# Patient Record
Sex: FEMALE | Race: WHITE | NOT HISPANIC OR LATINO | Employment: FULL TIME | URBAN - METROPOLITAN AREA
[De-identification: names, ages, dates, MRNs, and addresses within clinical notes are randomized per-mention and may not be internally consistent; named-entity substitution may affect disease eponyms.]

---

## 2017-01-04 ENCOUNTER — GENERIC CONVERSION - ENCOUNTER (OUTPATIENT)
Dept: OTHER | Facility: OTHER | Age: 52
End: 2017-01-04

## 2017-01-06 ENCOUNTER — APPOINTMENT (OUTPATIENT)
Dept: LAB | Facility: HOSPITAL | Age: 52
End: 2017-01-06
Payer: COMMERCIAL

## 2017-01-06 ENCOUNTER — TRANSCRIBE ORDERS (OUTPATIENT)
Dept: LAB | Facility: HOSPITAL | Age: 52
End: 2017-01-06

## 2017-01-06 DIAGNOSIS — J31.0 CHRONIC RHINITIS: ICD-10-CM

## 2017-01-06 DIAGNOSIS — R10.9 ABDOMINAL PAIN, UNSPECIFIED SITE: Primary | ICD-10-CM

## 2017-01-06 DIAGNOSIS — R10.9 ABDOMINAL PAIN, UNSPECIFIED SITE: ICD-10-CM

## 2017-01-06 PROCEDURE — 86003 ALLG SPEC IGE CRUDE XTRC EA: CPT

## 2017-01-06 PROCEDURE — 82785 ASSAY OF IGE: CPT

## 2017-01-09 LAB
ALLERGEN COMMENT: ABNORMAL
ALMOND IGE QN: 0.17 KUA/I
CASHEW NUT IGE QN: 0.13 KUA/I
CODFISH IGE QN: <0.1 KUA/I
EGG WHITE IGE QN: <0.1 KUA/I
GLUTEN IGE QN: <0.1 KUA/I
HAZELNUT IGE QN: 0.14 KUA/L
MILK IGE QN: <0.1 KUA/I
PEANUT (RARA H) 1 IGE QN: <0.1 KUA/I
PEANUT (RARA H) 2 IGE QN: <0.1 KUA/I
PEANUT (RARA H) 3 IGE QN: <0.1 KUA/I
PEANUT (RARA H) 8 IGE QN: <0.1 KUA/I
PEANUT (RARA H) 9 IGE QN: <0.1 KUA/I
PEANUT IGE QN: 0.2 KUA/I
SALMON IGE QN: <0.1 KUA/I
SCALLOP IGE QN: <0.1 KUA/I
SESAME SEED IGE QN: 0.21 KUA/I
SHRIMP IGE QN: <0.1 KUA/I
SOYBEAN IGE QN: 0.16 KUA/I
TOTAL IGE SMQN RAST: 3.88 KU/L (ref 0–113)
TUNA IGE QN: <0.1 KUA/I
WALNUT IGE QN: 0.13 KUA/I
WHEAT IGE QN: 0.19 KUA/I

## 2017-01-10 ENCOUNTER — ALLSCRIPTS OFFICE VISIT (OUTPATIENT)
Dept: OTHER | Facility: OTHER | Age: 52
End: 2017-01-10

## 2017-02-14 ENCOUNTER — ALLSCRIPTS OFFICE VISIT (OUTPATIENT)
Dept: OTHER | Facility: OTHER | Age: 52
End: 2017-02-14

## 2017-02-14 DIAGNOSIS — Z12.31 ENCOUNTER FOR SCREENING MAMMOGRAM FOR MALIGNANT NEOPLASM OF BREAST: ICD-10-CM

## 2017-02-14 PROCEDURE — G0145 SCR C/V CYTO,THINLAYER,RESCR: HCPCS | Performed by: OBSTETRICS & GYNECOLOGY

## 2017-02-14 PROCEDURE — 87624 HPV HI-RISK TYP POOLED RSLT: CPT | Performed by: OBSTETRICS & GYNECOLOGY

## 2017-02-16 ENCOUNTER — LAB REQUISITION (OUTPATIENT)
Dept: LAB | Facility: HOSPITAL | Age: 52
End: 2017-02-16
Payer: COMMERCIAL

## 2017-02-16 DIAGNOSIS — Z01.419 ENCOUNTER FOR GYNECOLOGICAL EXAMINATION WITHOUT ABNORMAL FINDING: ICD-10-CM

## 2017-02-21 LAB — HPV RRNA GENITAL QL NAA+PROBE: NORMAL

## 2017-02-23 LAB
LAB AP GYN PRIMARY INTERPRETATION: NORMAL
Lab: NORMAL

## 2017-03-01 ENCOUNTER — ALLSCRIPTS OFFICE VISIT (OUTPATIENT)
Dept: OTHER | Facility: OTHER | Age: 52
End: 2017-03-01

## 2017-03-13 ENCOUNTER — HOSPITAL ENCOUNTER (OUTPATIENT)
Dept: RADIOLOGY | Facility: HOSPITAL | Age: 52
Discharge: HOME | End: 2017-03-13
Attending: OBSTETRICS & GYNECOLOGY
Payer: COMMERCIAL

## 2017-03-13 DIAGNOSIS — Z12.31 ENCOUNTER FOR SCREENING MAMMOGRAM FOR MALIGNANT NEOPLASM OF BREAST: ICD-10-CM

## 2017-03-13 PROCEDURE — G0202 SCR MAMMO BI INCL CAD: HCPCS

## 2017-04-25 ENCOUNTER — GENERIC CONVERSION - ENCOUNTER (OUTPATIENT)
Dept: OTHER | Facility: OTHER | Age: 52
End: 2017-04-25

## 2017-06-19 ENCOUNTER — ALLSCRIPTS OFFICE VISIT (OUTPATIENT)
Dept: OTHER | Facility: OTHER | Age: 52
End: 2017-06-19

## 2017-08-04 ENCOUNTER — APPOINTMENT (OUTPATIENT)
Dept: LAB | Facility: HOSPITAL | Age: 52
End: 2017-08-04
Payer: COMMERCIAL

## 2017-08-04 ENCOUNTER — TRANSCRIBE ORDERS (OUTPATIENT)
Dept: LAB | Facility: HOSPITAL | Age: 52
End: 2017-08-04

## 2017-08-04 DIAGNOSIS — Z00.8 ENCOUNTER FOR OTHER GENERAL EXAMINATION: Primary | ICD-10-CM

## 2017-08-04 DIAGNOSIS — Z00.8 ENCOUNTER FOR OTHER GENERAL EXAMINATION: ICD-10-CM

## 2017-08-04 LAB
CHOLEST SERPL-MCNC: 247 MG/DL (ref 50–200)
EST. AVERAGE GLUCOSE BLD GHB EST-MCNC: 131 MG/DL
HBA1C MFR BLD: 6.2 % (ref 4.2–6.3)
HDLC SERPL-MCNC: 48 MG/DL (ref 40–60)
LDLC SERPL CALC-MCNC: 156 MG/DL (ref 0–100)
TRIGL SERPL-MCNC: 214 MG/DL

## 2017-08-04 PROCEDURE — 36415 COLL VENOUS BLD VENIPUNCTURE: CPT

## 2017-08-04 PROCEDURE — 83036 HEMOGLOBIN GLYCOSYLATED A1C: CPT

## 2017-08-04 PROCEDURE — 80061 LIPID PANEL: CPT

## 2017-12-29 ENCOUNTER — ALLSCRIPTS OFFICE VISIT (OUTPATIENT)
Dept: OTHER | Facility: OTHER | Age: 52
End: 2017-12-29

## 2018-01-02 ENCOUNTER — GENERIC CONVERSION - ENCOUNTER (OUTPATIENT)
Dept: OTHER | Facility: OTHER | Age: 53
End: 2018-01-02

## 2018-01-03 NOTE — PROGRESS NOTES
Assessment   1  Stress (V62 89) (F43 9)   2  Gastritis (535 50) (K29 70)   3  Muscle tension headache (307 81) (G44 209)   4  Neck pain (723 1) (M54 2)    Plan   Gastritis    · Omeprazole 40 MG Oral Capsule Delayed Release; TAKE 1 CAPSULE EVERY    DAY   · Follow Up if Not Better Evaluation and Treatment  Follow-up  Status: Complete  Done:    [id number]  Muscle tension headache    · Butalbital-APAP-Caffeine -40 MG Oral Capsule (Fioricet); 1-2 TABS PO Q    4-6 HRS/PRN HEADACHE  DO NOT EXCEED 6 TABS A DAY  Neck pain    · OMT 1-2 body regions - POC; Status:Complete;   Done: 08HQZ5544 08:48AM    Discussion/Summary      Soft tissue to cervical and upper thorax  stress and tension HA's  Possible side effects of new medications were reviewed with the patient/guardian today  The treatment plan was reviewed with the patient/guardian  The patient/guardian understands and agrees with the treatment plan       Self Referrals: No      Chief Complaint   pt c/o neck pain and headaches      History of Present Illness   HPI: Neck pain and HA's  Stress,  in Hospital for 2 days with A-Fib  Left CT junction and cervical discomfort  Stomach has been upset, some nausea, decreased appetite  Review of Systems        Constitutional: No fever, no chills, feels well, no tiredness, no recent weight gain or loss  ENT: no ear ache, no loss of hearing, no nosebleeds or nasal discharge, no sore throat or hoarseness  Cardiovascular: no complaints of slow or fast heart rate, no chest pain, no palpitations, no leg claudication or lower extremity edema  Respiratory: no complaints of shortness of breath, no wheezing, no dyspnea on exertion, no orthopnea or PND  Gastrointestinal: abdominal pain-- and-- nausea, but-- as noted in HPI  Genitourinary: no complaints of dysuria, no incontinence, no pelvic pain, no dysmenorrhea, no vaginal discharge or abnormal vaginal bleeding  Musculoskeletal: as noted in HPI  Integumentary: no complaints of skin rash or lesion, no itching or dry skin, no skin wounds  Neurological: headache, but-- as noted in HPI,-- no numbness,-- no tingling,-- no confusion,-- no dizziness-- and-- no fainting  Active Problems   1  Abdominal pressure (789 00) (R10 9)   2  Acute sinusitis (461 9) (J01 90)   3  Anxiety (300 00) (F41 9)   4  Cough (786 2) (R05)   5  Dehydration (276 51) (E86 0)   6  Depression (311) (F32 9)   7  Encounter for gynecological examination (V72 31) (Z01 419)   8  Encounter for screening colonoscopy (V76 51) (Z12 11)   9  Encounter for screening mammogram for malignant neoplasm of breast (V76 12)     (Z12 31)   10  Esophageal reflux (530 81) (K21 9)   11  Essential hypertriglyceridemia (272 1) (E78 1)   12  Fatigue (780 79) (R53 83)   13  Fever blister (054 9) (B00 1)   14  Food allergy (V15 05) (Z91 018)   15  Gastroenteritis, acute (558 9) (K52 9)   16  H/O cold sores (V12 09) (Z86 19)   17  Headache (784 0) (R51)   18  History of allergy (V15 09) (Z88 9)   19  Hypercholesterolemia (272 0) (E78 00)   20  Lymph Nodes Enlarged (785 6)   21  Nausea (787 02) (R11 0)   22  Pain in joint of left shoulder (719 41) (M25 512)   23  Pap test, as part of routine gynecological examination (V76 2) (Z01 419)   24  Partial tear of rotator cuff (840 4) (M75 110)   25  Prolapsing Mitral Valve Leaflet Syndrome (424 0)   26  Rhinitis (472 0) (J31 0)   27  Screening for depression (V79 0) (Z13 89)   28  Seasonal allergies (477 9) (J30 2)   29  Shingles (053 9) (B02 9)   30  Unequal leg length (acquired) (736 81) (M21 70)   31  Urinary frequency (788 41) (R35 0)   32  Vitamin D deficiency (268 9) (E55 9)   33  Wheezing (786 07) (R06 2)    Past Medical History   1  Acute bronchitis (466 0) (J20 9)   2  History of Ankle pain, unspecified laterality   3  History of Cervicalgia (723 1) (M54 2)   4  History of abdominal pain (V13 89) (Z87 898)   5   History of insomnia (V13 89) (Z72 188) 6  History of tension headache (V13 89) (Z87 898)   7  History of Nasal congestion (478 19) (R09 81)   8  History of Pain in wrist, unspecified laterality (719 43) (M25 539)   9  History of Pain of lower leg, unspecified laterality (729 5) (M79 669)   10  Viral infection (079 99) (B34 9)    Family History   Mother    1  Family history of Stroke Syndrome (V17 1)  Father    2  Family history of Stroke Syndrome (V17 1)  Brother    3  Family history of Stroke Syndrome (V17 1)  Family History    4  Family history of Diabetes Mellitus (V18 0)   5  Family history of Hyperlipidemia   6  Family history of Hypertension (V17 49)    Social History    · Being A Social Drinker   · Never A Smoker    Surgical History   1  History of Cholecystectomy Laparoscopic    Current Meds    1  Black Cohosh 20 MG Oral Tablet; TAKE AS DIRECTED; Therapy: 04LOY4422 to Recorded   2  LORazepam 0 5 MG Oral Tablet; 1 TAB TID PRN; Therapy: 85BFW4432 to (Evaluate:99Jpg9773)  Requested for: 86HQO8068; Last     Rx:08Jun2017 Ordered   3  Multivitamins Oral Capsule; 1 TAB DAILY; Therapy: 08KGR2267 to (Last Rx:10Jan2012)  Requested for: 19AKI5923 Ordered   4  Omega-3 Fish Oil 1200 MG Oral Capsule; TAKE AS DIRECTED; Therapy: 91JBH3888 to Recorded   5  Probiotic Oral Capsule; Therapy: 94IQF8078 to Recorded   6  Simvastatin 20 MG Oral Tablet; TAKE 1 TABLET DAILY; Therapy: 84ENQ6158 to (Evaluate:23Scv6351)  Requested for: 10KQJ4477; Last     Rx:90Rpm1753 Ordered   7  ZyrTEC Allergy 10 MG Oral Tablet; TAKE 1 TABLET DAILY AS DIRECTED; Therapy: 31HHU1603 to (Evaluate:07Apr2018)  Requested for: 03 98 18 21 22; Last     Rx:09Oct2017 Ordered     The medication list was reviewed and updated today  Allergies   1  Bactrim TABS  2  Food   3  Nuts   4  Pollen   5  Seasonal   6  Seeds   7  Trees   8  [address]   9   Wheat    Vitals    Recorded: 51PBO5166 11:45AM   Temperature 98 3 F, Oral   Heart Rate 76   Systolic 633, LUE   Diastolic 84, LUE Height 5 ft 4 75 in   Weight 169 lb    BMI Calculated 28 34   BSA Calculated 1 84   O2 Saturation 98     Physical Exam        Constitutional      General appearance: No acute distress, well appearing and well nourished  Eyes      Conjunctiva and lids: No swelling, erythema or discharge  Pupils and irises: Equal, round and reactive to light  Ears, Nose, Mouth, and Throat      External inspection of ears and nose: Normal        Otoscopic examination: Tympanic membranes translucent with normal light reflex  Canals patent without erythema  Nasal mucosa, septum, and turbinates: Normal without edema or erythema  Oropharynx: Normal with no erythema, edema, exudate or lesions  Pulmonary      Respiratory effort: No increased work of breathing or signs of respiratory distress  Auscultation of lungs: Clear to auscultation  Cardiovascular      Auscultation of heart: Normal rate and rhythm, normal S1 and S2, without murmurs  Examination of extremities for edema and/or varicosities: Normal        Abdomen      Abdomen: Non-tender, no masses  Liver and spleen: No hepatomegaly or splenomegaly  Lymphatic      Palpation of lymph nodes in neck: No lymphadenopathy  Musculoskeletal      Gait and station: Normal        Digits and nails: Normal without clubbing or cyanosis  Inspection/palpation of joints, bones, and muscles: Abnormal  -- (Supine: Muscle spasms cervical, tender soft tissue)      Skin      Skin and subcutaneous tissue: Normal without rashes or lesions  Neurologic      Cranial nerves: Cranial nerves 2-12 intact  Reflexes: 2+ and symmetric  Sensation: No sensory loss         Psychiatric      Orientation to person, place, and time: Normal        Mood and affect: Normal           Signatures    Electronically signed by : Abdirashid Rodríguez DO; Jan 2 2018  8:49AM EST                       (Author)

## 2018-01-12 NOTE — MISCELLANEOUS
Message    Date: 06/09/2016 12:27 PM,   Pt called emergency line to say she has a UTI and needs Cipro Rx sent in because she is leaving on vacation Saturday morning  I called patient and explained to patient that she needs to schedule an appointment tomorrow  Pt refused because she said she needs something today bc of her hx of UTI's  She states her UTI's usually get worst very fast- she knows if she does not start treatment then she will get hematuria by tonight  Per Dr Payam Kimble verbal orders, rx for Cipro [facility] be [facility] to [facility]  I called patient and let her know Dr Elzbieta Beyer did say it was ok to send Cipro rx in and suggested patient should also drink plenty of water  She stated she understood  VR/vfp        Active Problems    1  Abdominal pressure (789 00) (R10 9)   2  Acute sinusitis (461 9) (J01 90)   3  Anxiety (300 00) (F41 9)   4  Dehydration (276 51) (E86 0)   5  Depression (311) (F32 9)   6  Encounter for screening colonoscopy (V76 51) (Z12 11)   7  Encounter for screening mammogram for malignant neoplasm of breast (V76 12)   (Z12 31)   8  Esophageal reflux (530 81) (K21 9)   9  Essential hypertriglyceridemia (272 1) (E78 1)   10  Fatigue (780 79) (R53 83)   11  Fever blister (054 9) (B00 1)   12  Headache (784 0) (R51)   13  History of allergy (V15 09) (Z88 9)   14  Hypercholesterolemia (272 0) (E78 0)   15  Lymph Nodes Enlarged (785 6)   16  Pain in joint of left shoulder (719 41) (M25 512)   17  Pap test, as part of routine gynecological examination (V76 2) (Z01 419)   18  Partial tear of rotator cuff (840 4) (M75 110)   19  Prolapsing Mitral Valve Leaflet Syndrome (424 0)   20  Rhinitis (472 0) (J31 0)   21  Seasonal allergies (477 9) (J30 2)   22  Shingles (053 9) (B02 9)   23  Unequal leg length (acquired) (736 81) (M21 70)   24  Urinary frequency (788 41) (R35 0)   25  Vitamin D deficiency (268 9) (E55 9)    Current Meds   1   Ciprofloxacin HCl - 250 MG Oral Tablet; TAKE 1 TABLET EVERY 12 HOURS DAILY; Therapy: 70ZBY4427 to (Abdoul Job)  Requested for: 81Aud5421; Last   Rx:25Phm5893 Ordered   2  Multivitamins Oral Capsule; 1 TAB DAILY; Therapy: 60GVG5162 to (Last Rx:10Jan2012)  Requested for: 17BVI4536 Ordered   3  Pyridium 200 MG Oral Tablet; TAKE 1 TABLET 3 TIMES DAILY AFTER MEALS; Therapy: 67CFJ2942 to (Tawanda Saxena)  Requested for: 04Qjy5349; Last   Rx:13Nex2319 Ordered   4  Simvastatin 20 MG Oral Tablet; TAKE 1 TABLET DAILY; Therapy: 96GJT8300 to (Pedro Mejia)  Requested for: 20-33-70-48; Last   Rx:28Oct2015 Ordered   5  ZyrTEC Allergy 10 MG Oral Tablet; TAKE 1 TABLET DAILY AS DIRECTED; Therapy: 65OIC4553 to (Evaluate:64Wbs9458)  Requested for: 34WFE7227; Last   Rx:16Nov2015 Ordered    Allergies    1  Bactrim TABS    2  No Known Food Allergies   3  Pollen   4   Trees    Plan  Urinary frequency    · Ciprofloxacin HCl - 250 MG Oral Tablet; TAKE 1 TABLET EVERY 12 HOURS  DAILY    Signatures   Electronically signed by : Patricia Romero DO; Ghulam 10 2016  1:36PM EST                       (Author)

## 2018-01-13 VITALS
RESPIRATION RATE: 20 BRPM | BODY MASS INDEX: 27.52 KG/M2 | DIASTOLIC BLOOD PRESSURE: 66 MMHG | WEIGHT: 165.2 LBS | TEMPERATURE: 97.6 F | HEIGHT: 65 IN | OXYGEN SATURATION: 96 % | HEART RATE: 70 BPM | SYSTOLIC BLOOD PRESSURE: 110 MMHG

## 2018-01-13 VITALS
SYSTOLIC BLOOD PRESSURE: 112 MMHG | BODY MASS INDEX: 26.36 KG/M2 | WEIGHT: 164 LBS | HEIGHT: 66 IN | DIASTOLIC BLOOD PRESSURE: 70 MMHG

## 2018-01-13 VITALS
HEIGHT: 65 IN | DIASTOLIC BLOOD PRESSURE: 68 MMHG | RESPIRATION RATE: 16 BRPM | BODY MASS INDEX: 28.82 KG/M2 | OXYGEN SATURATION: 98 % | WEIGHT: 173 LBS | TEMPERATURE: 98.3 F | HEART RATE: 73 BPM | SYSTOLIC BLOOD PRESSURE: 116 MMHG

## 2018-01-13 VITALS
BODY MASS INDEX: 27.48 KG/M2 | DIASTOLIC BLOOD PRESSURE: 84 MMHG | WEIGHT: 171 LBS | SYSTOLIC BLOOD PRESSURE: 140 MMHG | HEART RATE: 66 BPM | TEMPERATURE: 98 F | HEIGHT: 66 IN

## 2018-01-15 NOTE — MISCELLANEOUS
Message   Date: 01/04/2017 11:36 AM,   Pt states she has been having stomach issues for a while now, it keeps her at night and even wakes her up from sleep  Pt was referred to an allergy specialist, Dr Esme Guthrie, by a dietitian at [facility]  When patient called to set up appt she was told they prefer patient get MRT lab test for allergy ordered by PCP and if it is abnormal then they will schedule to see her  Patient requesting allergy lab test   LM for patient stating Dr Tomasz Dale said he can order an allergy food profile lab test but he is not familiar what a MRT lab test is  I stated patient should call back to let us know if she would like to  the lab script or if she would like it faxed, etc  VR/vfp        Active Problems    1  Abdominal pressure (789 00) (R10 9)   2  Acute sinusitis (461 9) (J01 90)   3  Anxiety (300 00) (F41 9)   4  Dehydration (276 51) (E86 0)   5  Depression (311) (F32 9)   6  Encounter for screening colonoscopy (V76 51) (Z12 11)   7  Encounter for screening mammogram for malignant neoplasm of breast (V76 12)   (Z12 31)   8  Esophageal reflux (530 81) (K21 9)   9  Essential hypertriglyceridemia (272 1) (E78 1)   10  Fatigue (780 79) (R53 83)   11  Fever blister (054 9) (B00 1)   12  Gastroenteritis, acute (558 9) (K52 9)   13  H/O cold sores (V12 09) (Z86 19)   14  Headache (784 0) (R51)   15  History of allergy (V15 09) (Z88 9)   16  Hypercholesterolemia (272 0) (E78 00)   17  Lymph Nodes Enlarged (785 6)   18  Nausea (787 02) (R11 0)   19  Pain in joint of left shoulder (719 41) (M25 512)   20  Pap test, as part of routine gynecological examination (V76 2) (Z01 419)   21  Partial tear of rotator cuff (840 4) (M75 110)   22  Prolapsing Mitral Valve Leaflet Syndrome (424 0)   23  Rhinitis (472 0) (J31 0)   24  Seasonal allergies (477 9) (J30 2)   25  Shingles (053 9) (B02 9)   26  Unequal leg length (acquired) (736 81) (M21 70)   27  Urinary frequency (788 41) (R35 0)   28   Vitamin D deficiency (268 9) (E55 9)    Current Meds   1  Multivitamins Oral Capsule; 1 TAB DAILY; Therapy: 99CWJ7210 to (Last Rx:10Jan2012)  Requested for: 01YSK8426 Ordered   2  Ondansetron HCl - 4 MG Oral Tablet; TAKE 1 TABLET Twice daily PRN; Therapy: 99Odb4102 to (Gibran Faye)  Requested for: 56Qcm7060; Last   Rx:68Ubk8035 Ordered   3  Simvastatin 20 MG Oral Tablet; TAKE 1 TABLET DAILY; Therapy: 02SGL4787 to (Renee Silvestre)  Requested for: 21Oct2016; Last   Rx:49Sdn8128; Status: ACTIVE - Renewal Denied Ordered   4  ValACYclovir HCl - 500 MG Oral Tablet; TAKE 1 TABLET EVERY DAY; Therapy: 64SPC6982 to (Last Rx:11Vvc2855)  Requested for: 99Slt5542 Ordered   5  ZyrTEC Allergy 10 MG Oral Tablet; TAKE 1 TABLET DAILY AS DIRECTED; Therapy: 45TIF6043 to (Solomon Bo)  Requested for: 30Aug2016; Last   Rx:74Brl4068 Ordered    Allergies    1  Bactrim TABS    2  No Known Food Allergies   3  Pollen   4  Trees    Plan  Abdominal pressure, Rhinitis    · *(Q) FOOD ALLERGY PROFILE; Status:Active;  Requested SCS:71XCD2229;     Signatures   Electronically signed by : Jaye Lozano DO; Jan 9 2017  9:20AM EST                       (Author)

## 2018-01-15 NOTE — MISCELLANEOUS
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  Patient states she is unable to make it into the office for a visit today because of work but she can schedule to see you another day, she would like something called in today to prevent it from getting too bad  17 Feb 2016 5:14 PM   LM for pt per Dr Wilmer Lowery, stating generic for valtrex was sent into [facility] pharmacy for her and if she has any other questions or concerns she can call us back  VR/vfp        Active Problems    1  Acute sinusitis (461 9) (J01 90)   2  Anxiety (300 00) (F41 9)   3  Dehydration (276 51) (E86 0)   4  Depression (311) (F32 9)   5  Encounter for screening colonoscopy (V76 51) (Z12 11)   6  Encounter for screening mammogram for malignant neoplasm of breast (V76 12)   (Z12 31)   7  Esophageal reflux (530 81) (K21 9)   8  Essential hypertriglyceridemia (272 1) (E78 1)   9  Fatigue (780 79) (R53 83)   10  Fever blister (054 9) (B00 1)   11  Headache (784 0) (R51)   12  History of allergy (V15 09) (Z88 9)   13  Hypercholesterolemia (272 0) (E78 0)   14  Lymph Nodes Enlarged (785 6)   15  Pain in joint of left shoulder (719 41) (M25 512)   16  Pap test, as part of routine gynecological examination (V76 2) (Z12 4)   17  Partial tear of rotator cuff (840 4) (M75 110)   18  Prolapsing Mitral Valve Leaflet Syndrome (424 0)   19  Rhinitis (472 0) (J31 0)   20  Seasonal allergies (477 9) (J30 2)   21  Shingles (053 9) (B02 9)   22  Unequal leg length (acquired) (736 81) (M21 70)   23  Vitamin D deficiency (268 9) (E55 9)    Current Meds   1  Multivitamins Oral Capsule; 1 TAB DAILY;    Therapy: 59OCE7203 to (Last Rx:10Jan2012)  Requested for: 66RUS6763 Ordered   2  Simvastatin 20 MG Oral Tablet; TAKE 1 TABLET DAILY; Therapy: 78RZU5416 to (0489 33 97 26)  Requested for: 20-33-70-48; Last   Rx:28Oct2015 Ordered   3  TraMADol HCl - 50 MG Oral Tablet; TAKE 1 TABLET 3 TIMES DAILY AS NEEDED; Therapy: 69ROG7664 to (Evaluate:67Hol2343); Last Rx:30Oct2015 Ordered   4  ValACYclovir HCl - 1 GM Oral Tablet; TAKE 1 TABLET 3 TIMES DAILY; Therapy: 26ILO0932 to (Jaya Semen)  Requested for: 63RCN2411; Last   Rx:28Oct2015 Ordered   5  ZyrTEC Allergy 10 MG Oral Tablet; TAKE 1 TABLET DAILY AS DIRECTED; Therapy: 51BFA0020 to (Evaluate:89Zme9266)  Requested for: 15LZD7421; Last   Rx:16Nov2015 Ordered    Allergies    1  Bactrim TABS    2  No Known Food Allergies   3  Pollen   4   Trees    Plan  Shingles    · ValACYclovir HCl - 1 GM Oral Tablet (Valtrex); TAKE 1 TABLET 3 TIMES DAILY    Signatures   Electronically signed by : Blaze Boucher DO; Feb 19 2016  7:40AM EST                       (Author)

## 2018-01-23 VITALS
TEMPERATURE: 98.3 F | HEART RATE: 76 BPM | SYSTOLIC BLOOD PRESSURE: 120 MMHG | HEIGHT: 65 IN | OXYGEN SATURATION: 98 % | BODY MASS INDEX: 28.16 KG/M2 | WEIGHT: 169 LBS | DIASTOLIC BLOOD PRESSURE: 84 MMHG

## 2018-01-23 NOTE — MISCELLANEOUS
Message  Return to work or school:   Napoleon Verduzco is under my professional care  She was seen in my office on 12/29/2017  Please excuse patient for 01/03/2018 to 01/04/2018  She is able to return to work on  01/05/2018              Signatures   Electronically signed by : Lilia Chong DO; Jan 2 2018  3:17PM EST                       (Author)

## 2018-03-01 ENCOUNTER — OFFICE VISIT (OUTPATIENT)
Dept: OBGYN CLINIC | Facility: CLINIC | Age: 53
End: 2018-03-01
Payer: COMMERCIAL

## 2018-03-01 VITALS
WEIGHT: 171 LBS | HEIGHT: 65 IN | DIASTOLIC BLOOD PRESSURE: 62 MMHG | BODY MASS INDEX: 28.49 KG/M2 | SYSTOLIC BLOOD PRESSURE: 100 MMHG

## 2018-03-01 DIAGNOSIS — Z12.39 BREAST CANCER SCREENING: ICD-10-CM

## 2018-03-01 DIAGNOSIS — N95.2 VAGINAL ATROPHY: ICD-10-CM

## 2018-03-01 DIAGNOSIS — Z01.419 ENCOUNTER FOR ANNUAL ROUTINE GYNECOLOGICAL EXAMINATION: Primary | ICD-10-CM

## 2018-03-01 PROCEDURE — 99396 PREV VISIT EST AGE 40-64: CPT | Performed by: OBSTETRICS & GYNECOLOGY

## 2018-03-01 PROCEDURE — 87624 HPV HI-RISK TYP POOLED RSLT: CPT | Performed by: OBSTETRICS & GYNECOLOGY

## 2018-03-01 PROCEDURE — G0145 SCR C/V CYTO,THINLAYER,RESCR: HCPCS | Performed by: OBSTETRICS & GYNECOLOGY

## 2018-03-01 RX ORDER — FOLIC ACID/MULTIVIT,IRON,MINER 0.4MG-18MG
TABLET ORAL
COMMUNITY
Start: 2017-03-01

## 2018-03-01 RX ORDER — ESTRADIOL 10 UG/1
1 INSERT VAGINAL 2 TIMES WEEKLY
Qty: 8 TABLET | Refills: 3 | Status: SHIPPED | OUTPATIENT
Start: 2018-03-01 | End: 2018-08-13

## 2018-03-01 RX ORDER — LORAZEPAM 0.5 MG/1
1 TABLET ORAL 3 TIMES DAILY PRN
COMMUNITY
Start: 2012-01-10 | End: 2018-08-13 | Stop reason: DRUGHIGH

## 2018-03-01 RX ORDER — OMEPRAZOLE 40 MG/1
1 CAPSULE, DELAYED RELEASE ORAL DAILY
COMMUNITY
Start: 2017-12-29 | End: 2018-04-11 | Stop reason: SDUPTHER

## 2018-03-01 RX ORDER — AZITHROMYCIN 250 MG/1
TABLET, FILM COATED ORAL DAILY
COMMUNITY
Start: 2017-06-19 | End: 2018-08-13

## 2018-03-01 RX ORDER — SIMVASTATIN 20 MG
1 TABLET ORAL DAILY
COMMUNITY
Start: 2014-09-30 | End: 2018-04-24 | Stop reason: SDUPTHER

## 2018-03-01 NOTE — PROGRESS NOTES
Assessment/Plan:    Pap smear done with HPV  Encouraged self-breast examination as well as calcium supplementation  Continue with annual mammogram   Discussed treatment options for symptomatic vaginal atrophy  She has failed various lubricants, os vena  Discussed vaginal estrogen  Risks and benefits reviewed  She will try Vagifem them 2 times per week  She did have a sensitivity to osphena and will monitor the symptoms closely when she is using vaginal estrogen  She will call with an update in 6-8 weeks  Return to office in 1 year  No problem-specific Assessment & Plan notes found for this encounter  Diagnoses and all orders for this visit:    Encounter for annual routine gynecological examination    Vaginal atrophy  -     estradiol (VAGIFEM, YUVAFEM) 10 MCG TABS vaginal tablet; Insert 1 tablet (10 mcg total) into the vagina 2 (two) times a week    Breast cancer screening  -     Mammo screening bilateral w cad    Other orders  -     azithromycin (ZITHROMAX) 250 mg tablet; Take by mouth daily  -     Black Cohosh 20 MG TABS; Take by mouth  -     LORazepam (ATIVAN) 0 5 mg tablet; Take 1 tablet by mouth 3 (three) times a day as needed  -     Multiple Vitamin (MULTIVITAMINS PO); Take 1 tablet by mouth daily  -     Omega-3 Fatty Acids (OMEGA-3 FISH OIL) 1200 MG CAPS; Take by mouth  -     omeprazole (PriLOSEC) 40 MG capsule; Take 1 capsule by mouth daily  -     Probiotic Product (PROBIOTIC-10) CAPS; Take by mouth  -     simvastatin (ZOCOR) 20 mg tablet; Take 1 tablet by mouth daily  -     Cetirizine HCl (ZYRTEC ALLERGY) 10 MG CAPS; Take 1 tablet by mouth daily          Subjective:      Patient ID: Raji Connor is a 52 y o  female  HPI      This is a 49-year-old female [de-identified] who presents for annual gyn exam   She went through menopause at age 50  She denies any vaginal bleeding or spotting  No changes in bowel or bladder function    She is sexually active and has been in a monogamous relationship with her  for over 23 years  She has had difficulty with vaginal dryness, dyspareunia  She has tried various lubricants over-the-counter which has been unsuccessful  Last year she did try ospena  After 2-3 days she had a rash noted along her trunk it was felt that she had an allergy  We discussed using vaginal estrogen risks and benefits reviewed  All questions answered  She would like to try this over the next month  Patient is due for her mammogram this month  Her last colonoscopy was in 2012 which was normal  She is up-to-date with her screening labs including cholesterol  The following portions of the patient's history were reviewed and updated as appropriate: allergies, current medications, past family history, past medical history, past social history, past surgical history and problem list     Review of Systems   Constitutional: Negative for fatigue, fever and unexpected weight change  Respiratory: Negative for cough, chest tightness, shortness of breath and wheezing  Cardiovascular: Negative  Negative for chest pain and palpitations  Gastrointestinal: Negative  Negative for abdominal distention, abdominal pain, blood in stool, constipation, diarrhea, nausea and vomiting  Genitourinary: Positive for dyspareunia  Negative for difficulty urinating, dysuria, flank pain, frequency, genital sores, hematuria, pelvic pain, urgency, vaginal bleeding, vaginal discharge and vaginal pain  Skin: Negative for rash  Objective:      /62   Ht 5' 5" (1 651 m)   Wt 77 6 kg (171 lb)   Breastfeeding? No   BMI 28 46 kg/m²          Physical Exam   Constitutional: She appears well-developed and well-nourished  Cardiovascular: Normal rate and regular rhythm  Pulmonary/Chest: Effort normal and breath sounds normal  Right breast exhibits no inverted nipple, no mass, no nipple discharge, no skin change and no tenderness   Left breast exhibits no inverted nipple, no mass, no nipple discharge, no skin change and no tenderness  Abdominal: Soft  Bowel sounds are normal  She exhibits no distension  There is no tenderness  There is no rebound and no guarding  Genitourinary: Rectum normal, vagina normal and uterus normal  There is no lesion on the right labia  There is no lesion on the left labia  Cervix exhibits no motion tenderness, no discharge and no friability  Right adnexum displays no mass, no tenderness and no fullness  Left adnexum displays no mass, no tenderness and no fullness  No vaginal discharge found     Genitourinary Comments: Vaginal atrophy noted

## 2018-03-06 LAB — HPV RRNA GENITAL QL NAA+PROBE: NORMAL

## 2018-03-10 LAB
LAB AP GYN PRIMARY INTERPRETATION: NORMAL
Lab: NORMAL

## 2018-04-09 RX ORDER — OMEPRAZOLE 40 MG/1
CAPSULE, DELAYED RELEASE ORAL
Qty: 90 CAPSULE | Refills: 0 | OUTPATIENT
Start: 2018-04-09

## 2018-04-11 DIAGNOSIS — R12 HEART BURN: Primary | ICD-10-CM

## 2018-04-11 RX ORDER — OMEPRAZOLE 40 MG/1
40 CAPSULE, DELAYED RELEASE ORAL DAILY
Qty: 90 CAPSULE | Refills: 0 | Status: SHIPPED | OUTPATIENT
Start: 2018-04-11 | End: 2018-07-10 | Stop reason: SDUPTHER

## 2018-04-20 ENCOUNTER — HOSPITAL ENCOUNTER (OUTPATIENT)
Dept: RADIOLOGY | Facility: HOSPITAL | Age: 53
Discharge: HOME | End: 2018-04-20
Attending: OBSTETRICS & GYNECOLOGY
Payer: COMMERCIAL

## 2018-04-20 PROCEDURE — 77067 SCR MAMMO BI INCL CAD: CPT

## 2018-04-23 RX ORDER — SIMVASTATIN 20 MG
TABLET ORAL
Qty: 90 TABLET | Refills: 0 | Status: CANCELLED | OUTPATIENT
Start: 2018-04-23

## 2018-04-24 DIAGNOSIS — E78.00 HYPERCHOLESTEREMIA: Primary | ICD-10-CM

## 2018-04-24 RX ORDER — SIMVASTATIN 20 MG
20 TABLET ORAL DAILY
Qty: 90 TABLET | Refills: 0 | Status: SHIPPED | OUTPATIENT
Start: 2018-04-24 | End: 2018-08-08 | Stop reason: SDUPTHER

## 2018-04-25 ENCOUNTER — TELEPHONE (OUTPATIENT)
Dept: OBGYN CLINIC | Facility: CLINIC | Age: 53
End: 2018-04-25

## 2018-04-25 NOTE — TELEPHONE ENCOUNTER
Patient calling with update on medication prescribed at last appt,- he is doing well and at this time she is going to continue

## 2018-05-18 ENCOUNTER — TELEPHONE (OUTPATIENT)
Dept: OBGYN CLINIC | Facility: CLINIC | Age: 53
End: 2018-05-18

## 2018-05-18 NOTE — TELEPHONE ENCOUNTER
Pt is stopping the medication yuvafen due to the side effect  She is starting to get severe depression and thinks is coming from that  She feels better now that off of it  She just wanted to let you know  She said if any questions, could give her a call   And id problems came back she would call back in

## 2018-06-20 ENCOUNTER — APPOINTMENT (OUTPATIENT)
Dept: LAB | Facility: HOSPITAL | Age: 53
End: 2018-06-20

## 2018-06-20 ENCOUNTER — TRANSCRIBE ORDERS (OUTPATIENT)
Dept: LAB | Facility: HOSPITAL | Age: 53
End: 2018-06-20

## 2018-06-20 DIAGNOSIS — Z00.8 HEALTH EXAMINATION IN POPULATION SURVEY: Primary | ICD-10-CM

## 2018-06-20 DIAGNOSIS — Z00.8 HEALTH EXAMINATION IN POPULATION SURVEY: ICD-10-CM

## 2018-06-20 LAB
CHOLEST SERPL-MCNC: 274 MG/DL (ref 50–200)
EST. AVERAGE GLUCOSE BLD GHB EST-MCNC: 126 MG/DL
HBA1C MFR BLD: 6 % (ref 4.2–6.3)
HDLC SERPL-MCNC: 52 MG/DL (ref 40–60)
NONHDLC SERPL-MCNC: 222 MG/DL
TRIGL SERPL-MCNC: 442 MG/DL

## 2018-06-20 PROCEDURE — 36415 COLL VENOUS BLD VENIPUNCTURE: CPT

## 2018-06-20 PROCEDURE — 80061 LIPID PANEL: CPT

## 2018-06-20 PROCEDURE — 83036 HEMOGLOBIN GLYCOSYLATED A1C: CPT

## 2018-07-10 DIAGNOSIS — R12 HEART BURN: ICD-10-CM

## 2018-07-10 RX ORDER — OMEPRAZOLE 40 MG/1
CAPSULE, DELAYED RELEASE ORAL
Qty: 90 CAPSULE | Refills: 0 | Status: SHIPPED | OUTPATIENT
Start: 2018-07-10 | End: 2018-08-13 | Stop reason: SDUPTHER

## 2018-07-10 NOTE — TELEPHONE ENCOUNTER
Pt called and asked for refills for Omeprazole, explained to patient she is due for 6 month follow up  She said she doesn't feel like she needs to come in for a visit since this is the only medication she takes, she states she just had blood work done and her BMI and she has a lot going on, patient stated she would just stop taking medication  Explained to patient that I will check with Dr Mark De La Cruz about sending refill but she should continue taking her medication and follow up with Dr Mark De La Cruz when she can especially since she just had blood work done  Patient understood

## 2018-08-06 DIAGNOSIS — E78.00 HYPERCHOLESTEREMIA: ICD-10-CM

## 2018-08-07 RX ORDER — SIMVASTATIN 20 MG
TABLET ORAL
Qty: 90 TABLET | Refills: 0 | OUTPATIENT
Start: 2018-08-07

## 2018-08-08 DIAGNOSIS — E78.00 HYPERCHOLESTEREMIA: ICD-10-CM

## 2018-08-08 RX ORDER — SIMVASTATIN 20 MG
20 TABLET ORAL DAILY
Qty: 90 TABLET | Refills: 0 | Status: SHIPPED | OUTPATIENT
Start: 2018-08-08 | End: 2018-11-12 | Stop reason: SDUPTHER

## 2018-08-13 ENCOUNTER — OFFICE VISIT (OUTPATIENT)
Dept: FAMILY MEDICINE CLINIC | Facility: CLINIC | Age: 53
End: 2018-08-13
Payer: COMMERCIAL

## 2018-08-13 VITALS
TEMPERATURE: 98.1 F | SYSTOLIC BLOOD PRESSURE: 124 MMHG | WEIGHT: 165.4 LBS | OXYGEN SATURATION: 98 % | BODY MASS INDEX: 27.56 KG/M2 | HEIGHT: 65 IN | HEART RATE: 79 BPM | DIASTOLIC BLOOD PRESSURE: 66 MMHG

## 2018-08-13 DIAGNOSIS — E78.00 HYPERCHOLESTEREMIA: ICD-10-CM

## 2018-08-13 DIAGNOSIS — R12 HEART BURN: Primary | ICD-10-CM

## 2018-08-13 DIAGNOSIS — F51.01 PRIMARY INSOMNIA: ICD-10-CM

## 2018-08-13 DIAGNOSIS — F41.9 ANXIETY: ICD-10-CM

## 2018-08-13 DIAGNOSIS — R53.83 TIREDNESS: ICD-10-CM

## 2018-08-13 PROCEDURE — 3008F BODY MASS INDEX DOCD: CPT | Performed by: FAMILY MEDICINE

## 2018-08-13 PROCEDURE — 1036F TOBACCO NON-USER: CPT | Performed by: FAMILY MEDICINE

## 2018-08-13 PROCEDURE — 99214 OFFICE O/P EST MOD 30 MIN: CPT | Performed by: FAMILY MEDICINE

## 2018-08-13 RX ORDER — OMEPRAZOLE 40 MG/1
40 CAPSULE, DELAYED RELEASE ORAL DAILY
Qty: 90 CAPSULE | Refills: 1 | Status: SHIPPED | OUTPATIENT
Start: 2018-08-13 | End: 2019-12-20 | Stop reason: SDUPTHER

## 2018-08-13 RX ORDER — LORAZEPAM 0.5 MG/1
0.5 TABLET ORAL 2 TIMES DAILY
Qty: 90 TABLET | Refills: 1 | Status: SHIPPED | OUTPATIENT
Start: 2018-08-13 | End: 2020-03-12 | Stop reason: CLARIF

## 2018-08-13 RX ORDER — LORAZEPAM 0.5 MG/1
0.5 TABLET ORAL 3 TIMES DAILY PRN
Qty: 90 TABLET | Refills: 1 | Status: CANCELLED | OUTPATIENT
Start: 2018-08-13 | End: 2019-02-09

## 2018-08-13 NOTE — PROGRESS NOTES
Assessment/Plan: patient here today to review BW and right ear blockage  Pt need Rx refills and would like to check TSH     No problem-specific Assessment & Plan notes found for this encounter  Diagnoses and all orders for this visit:    Heart burn  -     omeprazole (PriLOSEC) 40 MG capsule; Take 1 capsule (40 mg total) by mouth daily    Anxiety  -     LORazepam (ATIVAN) 0 5 mg tablet; Take 1 tablet (0 5 mg total) by mouth 2 (two) times a day 1 - 2 po daily as needed  Primary insomnia  -     LORazepam (ATIVAN) 0 5 mg tablet; Take 1 tablet (0 5 mg total) by mouth 2 (two) times a day 1 - 2 po daily as needed  Tiredness  -     TSH baseline; Future    Hypercholesteremia  -     Basic metabolic panel; Future  -     Hepatic function panel; Future    Other orders  -     Cancel: LORazepam (ATIVAN) 0 5 mg tablet; Take 1 tablet (0 5 mg total) by mouth 3 (three) times a day as needed for up to 180 days          Subjective:      Patient ID: Mary Fatima is a 52 y o  female  Follow up  Right ear blocked  Weight down 10 pounds eating only fruits and vegetables  Uses Ativan sparingly  Reflux controlled  Tiredness  Question getting enough protein ? The following portions of the patient's history were reviewed and updated as appropriate: allergies, current medications, past family history, past medical history, past social history, past surgical history and problem list     Review of Systems   Constitutional: Negative  HENT: Positive for hearing loss  Negative for tinnitus  Eyes: Negative  Respiratory: Negative  Cardiovascular: Negative  Gastrointestinal: Negative  Genitourinary: Negative  Musculoskeletal: Negative  Skin: Negative  Neurological: Negative  Psychiatric/Behavioral: Negative            Objective:      /66 (BP Location: Left arm, Patient Position: Sitting, Cuff Size: Standard)   Pulse 79   Temp 98 1 °F (36 7 °C) (Oral)   Ht 5' 5" (1 651 m)   Wt 75 kg (165 lb 6 4 oz)   SpO2 98%   BMI 27 52 kg/m²          Physical Exam   Constitutional: She is oriented to person, place, and time  She appears well-developed and well-nourished  HENT:   Head: Normocephalic and atraumatic  Right Ear: External ear normal    Left Ear: External ear normal    Nose: Nose normal    Mouth/Throat: Oropharynx is clear and moist    Canals clear  Eyes: Conjunctivae and EOM are normal  Pupils are equal, round, and reactive to light  Neck: Normal range of motion  Neck supple  Cardiovascular: Normal rate, regular rhythm, normal heart sounds and intact distal pulses  Pulmonary/Chest: Effort normal and breath sounds normal    Abdominal: Soft  Bowel sounds are normal    Neurological: She is alert and oriented to person, place, and time  Skin: Skin is warm and dry  Psychiatric: She has a normal mood and affect   Her behavior is normal  Judgment and thought content normal

## 2018-08-17 ENCOUNTER — APPOINTMENT (OUTPATIENT)
Dept: LAB | Facility: HOSPITAL | Age: 53
End: 2018-08-17
Payer: COMMERCIAL

## 2018-08-17 DIAGNOSIS — R53.83 TIREDNESS: ICD-10-CM

## 2018-08-17 DIAGNOSIS — E78.00 HYPERCHOLESTEREMIA: ICD-10-CM

## 2018-08-17 LAB
ALBUMIN SERPL BCP-MCNC: 4.1 G/DL (ref 3.5–5)
ALP SERPL-CCNC: 67 U/L (ref 46–116)
ALT SERPL W P-5'-P-CCNC: 33 U/L (ref 12–78)
ANION GAP SERPL CALCULATED.3IONS-SCNC: 7 MMOL/L (ref 4–13)
AST SERPL W P-5'-P-CCNC: 25 U/L (ref 5–45)
BILIRUB DIRECT SERPL-MCNC: 0.08 MG/DL (ref 0–0.2)
BILIRUB SERPL-MCNC: 0.41 MG/DL (ref 0.2–1)
BUN SERPL-MCNC: 14 MG/DL (ref 5–25)
CALCIUM SERPL-MCNC: 9.2 MG/DL (ref 8.3–10.1)
CHLORIDE SERPL-SCNC: 105 MMOL/L (ref 100–108)
CO2 SERPL-SCNC: 27 MMOL/L (ref 21–32)
CREAT SERPL-MCNC: 0.75 MG/DL (ref 0.6–1.3)
GFR SERPL CREATININE-BSD FRML MDRD: 90 ML/MIN/1.73SQ M
GLUCOSE P FAST SERPL-MCNC: 101 MG/DL (ref 65–99)
POTASSIUM SERPL-SCNC: 4 MMOL/L (ref 3.5–5.3)
PROT SERPL-MCNC: 7.6 G/DL (ref 6.4–8.2)
SODIUM SERPL-SCNC: 139 MMOL/L (ref 136–145)
TSH SERPL DL<=0.05 MIU/L-ACNC: 8.23 UIU/ML

## 2018-08-17 PROCEDURE — 84443 ASSAY THYROID STIM HORMONE: CPT

## 2018-08-17 PROCEDURE — 80048 BASIC METABOLIC PNL TOTAL CA: CPT

## 2018-08-17 PROCEDURE — 36415 COLL VENOUS BLD VENIPUNCTURE: CPT

## 2018-08-17 PROCEDURE — 80076 HEPATIC FUNCTION PANEL: CPT

## 2018-08-21 ENCOUNTER — TELEPHONE (OUTPATIENT)
Dept: FAMILY MEDICINE CLINIC | Facility: CLINIC | Age: 53
End: 2018-08-21

## 2018-08-21 DIAGNOSIS — E03.9 HYPOTHYROIDISM, UNSPECIFIED TYPE: Primary | ICD-10-CM

## 2018-08-21 RX ORDER — LEVOTHYROXINE SODIUM 0.03 MG/1
25 TABLET ORAL DAILY
Qty: 90 TABLET | Refills: 1 | Status: SHIPPED | OUTPATIENT
Start: 2018-08-21 | End: 2019-02-08 | Stop reason: SDUPTHER

## 2018-08-21 NOTE — TELEPHONE ENCOUNTER
Patient was called,insructions were given to repeat TSH in 3 months and to start levothyroxine 25 mcg 1 tablet daily

## 2018-08-21 NOTE — TELEPHONE ENCOUNTER
----- Message from Kimber Mcdowell DO sent at 8/21/2018  3:10 PM EDT -----  Please call the patient regarding her abnormal result  Increased TSH (thyroid low), start Levothyroxine 25 mcg and repeat TSH in about 3 months

## 2018-11-12 ENCOUNTER — APPOINTMENT (OUTPATIENT)
Dept: LAB | Facility: HOSPITAL | Age: 53
End: 2018-11-12
Payer: COMMERCIAL

## 2018-11-12 DIAGNOSIS — E03.9 HYPOTHYROIDISM, UNSPECIFIED TYPE: ICD-10-CM

## 2018-11-12 DIAGNOSIS — E78.00 HYPERCHOLESTEREMIA: ICD-10-CM

## 2018-11-12 LAB — TSH SERPL DL<=0.05 MIU/L-ACNC: 3.25 UIU/ML

## 2018-11-12 PROCEDURE — 84443 ASSAY THYROID STIM HORMONE: CPT

## 2018-11-12 PROCEDURE — 36415 COLL VENOUS BLD VENIPUNCTURE: CPT

## 2018-11-12 RX ORDER — SIMVASTATIN 20 MG
20 TABLET ORAL DAILY
Qty: 90 TABLET | Refills: 0 | Status: SHIPPED | OUTPATIENT
Start: 2018-11-12 | End: 2019-05-06

## 2019-01-07 ENCOUNTER — TELEPHONE (OUTPATIENT)
Dept: FAMILY MEDICINE CLINIC | Facility: CLINIC | Age: 54
End: 2019-01-07

## 2019-01-07 ENCOUNTER — OFFICE VISIT (OUTPATIENT)
Dept: FAMILY MEDICINE CLINIC | Facility: CLINIC | Age: 54
End: 2019-01-07
Payer: COMMERCIAL

## 2019-01-07 VITALS
BODY MASS INDEX: 27.66 KG/M2 | OXYGEN SATURATION: 98 % | HEART RATE: 92 BPM | WEIGHT: 166 LBS | HEIGHT: 65 IN | SYSTOLIC BLOOD PRESSURE: 122 MMHG | TEMPERATURE: 98.1 F | DIASTOLIC BLOOD PRESSURE: 74 MMHG

## 2019-01-07 DIAGNOSIS — H69.81 DYSFUNCTION OF RIGHT EUSTACHIAN TUBE: ICD-10-CM

## 2019-01-07 DIAGNOSIS — J40 BRONCHITIS: Primary | ICD-10-CM

## 2019-01-07 PROCEDURE — 3008F BODY MASS INDEX DOCD: CPT | Performed by: FAMILY MEDICINE

## 2019-01-07 PROCEDURE — 1036F TOBACCO NON-USER: CPT | Performed by: FAMILY MEDICINE

## 2019-01-07 PROCEDURE — 99213 OFFICE O/P EST LOW 20 MIN: CPT | Performed by: FAMILY MEDICINE

## 2019-01-07 RX ORDER — AMOXICILLIN 875 MG/1
875 TABLET, COATED ORAL 2 TIMES DAILY
Qty: 14 TABLET | Refills: 0 | Status: SHIPPED | OUTPATIENT
Start: 2019-01-07 | End: 2019-01-14

## 2019-01-07 RX ORDER — METHYLPREDNISOLONE 4 MG/1
TABLET ORAL
Qty: 21 TABLET | Refills: 0 | Status: SHIPPED | OUTPATIENT
Start: 2019-01-07 | End: 2019-05-06

## 2019-01-07 NOTE — PROGRESS NOTES
Assessment/Plan: pt here today for right earache, post nasal drip, productive cough and tiredness  x 11 days     No problem-specific Assessment & Plan notes found for this encounter  Diagnoses and all orders for this visit:    Bronchitis  -     amoxicillin (AMOXIL) 875 mg tablet; Take 1 tablet (875 mg total) by mouth 2 (two) times a day for 7 days  -     Methylprednisolone 4 MG TBPK; Use as directed on package    Dysfunction of right eustachian tube          Subjective:      Patient ID: Huey Wright is a 52 y o  female  Sick for 11 days  Now right ear feels blocked and occasional sharp pain  Cough yellow production  The following portions of the patient's history were reviewed and updated as appropriate: allergies, current medications, past family history, past medical history, past social history, past surgical history and problem list     Current Outpatient Prescriptions:     Black Cohosh 20 MG TABS, Take by mouth, Disp: , Rfl:     Cetirizine HCl (ZYRTEC ALLERGY) 10 MG CAPS, Take 1 tablet by mouth daily, Disp: , Rfl:     levothyroxine 25 mcg tablet, Take 1 tablet (25 mcg total) by mouth daily, Disp: 90 tablet, Rfl: 1    Multiple Vitamin (MULTIVITAMINS PO), Take 1 tablet by mouth daily, Disp: , Rfl:     Omega-3 Fatty Acids (OMEGA-3 FISH OIL) 1200 MG CAPS, Take by mouth, Disp: , Rfl:     omeprazole (PriLOSEC) 40 MG capsule, Take 1 capsule (40 mg total) by mouth daily, Disp: 90 capsule, Rfl: 1    Probiotic Product (PROBIOTIC-10) CAPS, Take by mouth, Disp: , Rfl:     simvastatin (ZOCOR) 20 mg tablet, Take 1 tablet (20 mg total) by mouth daily, Disp: 90 tablet, Rfl: 0    amoxicillin (AMOXIL) 875 mg tablet, Take 1 tablet (875 mg total) by mouth 2 (two) times a day for 7 days, Disp: 14 tablet, Rfl: 0    LORazepam (ATIVAN) 0 5 mg tablet, Take 1 tablet (0 5 mg total) by mouth 2 (two) times a day 1 - 2 po daily as needed   (Patient not taking: Reported on 1/7/2019 ), Disp: 90 tablet, Rfl: 1   Methylprednisolone 4 MG TBPK, Use as directed on package, Disp: 21 tablet, Rfl: 0    Review of Systems   Constitutional: Negative  HENT: Positive for congestion and hearing loss  Right ear pressure  Eyes: Negative  Respiratory: Positive for cough  Cardiovascular: Negative  Gastrointestinal: Negative  Genitourinary: Negative  Skin: Negative  Neurological: Negative  Psychiatric/Behavioral: Negative  Objective:      /74 (BP Location: Left arm, Patient Position: Sitting, Cuff Size: Standard)   Pulse 92   Temp 98 1 °F (36 7 °C) (Oral)   Ht 5' 5" (1 651 m)   Wt 75 3 kg (166 lb)   SpO2 98%   BMI 27 62 kg/m²          Physical Exam   Constitutional: She is oriented to person, place, and time  She appears well-developed and well-nourished  HENT:   Head: Normocephalic and atraumatic  Right Ear: External ear normal    Left Ear: External ear normal    Nose: Nose normal    Mouth/Throat: Oropharynx is clear and moist    TM 's clear  Eyes: Pupils are equal, round, and reactive to light  Conjunctivae and EOM are normal    Neck: Normal range of motion  Neck supple  Cardiovascular: Normal rate, regular rhythm, normal heart sounds and intact distal pulses  Pulmonary/Chest: Effort normal and breath sounds normal    Abdominal: Soft  Bowel sounds are normal    Neurological: She is alert and oriented to person, place, and time  Skin: Skin is warm and dry  Psychiatric: She has a normal mood and affect   Her behavior is normal  Judgment and thought content normal

## 2019-01-09 ENCOUNTER — TELEPHONE (OUTPATIENT)
Dept: FAMILY MEDICINE CLINIC | Facility: CLINIC | Age: 54
End: 2019-01-09

## 2019-01-09 NOTE — TELEPHONE ENCOUNTER
Called patient and let her know per Dr Chloe Jimenez orders, patient may have a work excuse and for the medrol dose pack he stated she can take them all at one time in the morning  She stated she understood and will take the dose for the day in the morning and will wait for fax to come in

## 2019-01-09 NOTE — TELEPHONE ENCOUNTER
1  Patient called to get another work excuse note for 01/08/19 to 01/09/19 and return 01/10/19 because could not sleep last night  2  Patient states instructions on the medrol dose pack says to take the last dose 2 hours before bedtime, she would like to know if she can take all the doses before 12 pm so she can have a good night rest  If she can take medication earlier, please give instructions

## 2019-02-08 DIAGNOSIS — E03.9 HYPOTHYROIDISM, UNSPECIFIED TYPE: ICD-10-CM

## 2019-02-08 RX ORDER — LEVOTHYROXINE SODIUM 0.03 MG/1
TABLET ORAL
Qty: 90 TABLET | Refills: 0 | OUTPATIENT
Start: 2019-02-08

## 2019-02-08 RX ORDER — LEVOTHYROXINE SODIUM 0.03 MG/1
25 TABLET ORAL DAILY
Qty: 90 TABLET | Refills: 1 | Status: SHIPPED | OUTPATIENT
Start: 2019-02-08 | End: 2019-05-06 | Stop reason: SDUPTHER

## 2019-02-08 NOTE — TELEPHONE ENCOUNTER
Patient called to request refill of Levothyroxine 25 mcg 1 po qd 90 day supply to be sent to [address] [place] [address]

## 2019-03-22 ENCOUNTER — OFFICE VISIT (OUTPATIENT)
Dept: FAMILY MEDICINE CLINIC | Facility: CLINIC | Age: 54
End: 2019-03-22
Payer: COMMERCIAL

## 2019-03-22 VITALS
SYSTOLIC BLOOD PRESSURE: 132 MMHG | HEART RATE: 69 BPM | OXYGEN SATURATION: 98 % | TEMPERATURE: 98 F | DIASTOLIC BLOOD PRESSURE: 68 MMHG | HEIGHT: 65 IN | BODY MASS INDEX: 27.49 KG/M2 | WEIGHT: 165 LBS

## 2019-03-22 DIAGNOSIS — F43.9 STRESS AT HOME: ICD-10-CM

## 2019-03-22 DIAGNOSIS — E55.9 VITAMIN D DEFICIENCY: ICD-10-CM

## 2019-03-22 DIAGNOSIS — E03.9 HYPOTHYROIDISM, UNSPECIFIED TYPE: ICD-10-CM

## 2019-03-22 DIAGNOSIS — F32.0 CURRENT MILD EPISODE OF MAJOR DEPRESSIVE DISORDER WITHOUT PRIOR EPISODE (HCC): ICD-10-CM

## 2019-03-22 DIAGNOSIS — R53.83 TIREDNESS: Primary | ICD-10-CM

## 2019-03-22 PROCEDURE — 99214 OFFICE O/P EST MOD 30 MIN: CPT | Performed by: FAMILY MEDICINE

## 2019-03-22 PROCEDURE — 3008F BODY MASS INDEX DOCD: CPT | Performed by: FAMILY MEDICINE

## 2019-03-22 RX ORDER — ACETAMINOPHEN 160 MG
2000 TABLET,DISINTEGRATING ORAL DAILY
COMMUNITY
End: 2020-03-12 | Stop reason: CLARIF

## 2019-03-22 NOTE — PROGRESS NOTES
Assessment/Plan: pt here today for not feeling right, chills, body aches,       No problem-specific Assessment & Plan notes found for this encounter  Diagnoses and all orders for this visit:    Tiredness  -     CBC and Platelet; Future  -     Comprehensive metabolic panel; Future  -     UA w Reflex to Microscopic w Reflex to Culture -Lab Collect    Stress at home    Current mild episode of major depressive disorder without prior episode (HCC)    Hypothyroidism, unspecified type  -     TSH, 3rd generation; Future    Vitamin D deficiency  -     Vitamin D 25 hydroxy; Future    Other orders  -     ALPHA LIPOIC ACID PO; Take by mouth  -     Cholecalciferol (VITAMIN D3) 2000 units capsule; Take 2,000 Units by mouth daily  -     Red Yeast Rice Extract (RED YEAST RICE PO); Take by mouth  -     Coenzyme Q10 (CO Q 10 PO); Take by mouth  -     Papaya Enzymes CHEW; Chew          Subjective:      Patient ID: Evi Jiménez is a 62 y o  female  Feeling tired, weak, gets full quickly when eating, comes home from work and goes to sleep  Stress with  and 80 yo sister with dementia (getting worse)        The following portions of the patient's history were reviewed and updated as appropriate: allergies, current medications, past family history, past medical history, past social history, past surgical history and problem list     Current Outpatient Medications:     ALPHA LIPOIC ACID PO, Take by mouth, Disp: , Rfl:     Black Cohosh 20 MG TABS, Take by mouth, Disp: , Rfl:     Cetirizine HCl (ZYRTEC ALLERGY) 10 MG CAPS, Take 1 tablet by mouth daily, Disp: , Rfl:     Cholecalciferol (VITAMIN D3) 2000 units capsule, Take 2,000 Units by mouth daily, Disp: , Rfl:     Coenzyme Q10 (CO Q 10 PO), Take by mouth, Disp: , Rfl:     levothyroxine 25 mcg tablet, Take 1 tablet (25 mcg total) by mouth daily for 180 days, Disp: 90 tablet, Rfl: 1    LORazepam (ATIVAN) 0 5 mg tablet, Take 1 tablet (0 5 mg total) by mouth 2 (two) times a day 1 - 2 po daily as needed  , Disp: 90 tablet, Rfl: 1    Multiple Vitamin (MULTIVITAMINS PO), Take 1 tablet by mouth daily, Disp: , Rfl:     Omega-3 Fatty Acids (OMEGA-3 FISH OIL) 1200 MG CAPS, Take by mouth, Disp: , Rfl:     omeprazole (PriLOSEC) 40 MG capsule, Take 1 capsule (40 mg total) by mouth daily, Disp: 90 capsule, Rfl: 1    Papaya Enzymes CHEW, Chew, Disp: , Rfl:     Probiotic Product (PROBIOTIC-10) CAPS, Take by mouth, Disp: , Rfl:     Red Yeast Rice Extract (RED YEAST RICE PO), Take by mouth, Disp: , Rfl:     Methylprednisolone 4 MG TBPK, Use as directed on package (Patient not taking: Reported on 3/22/2019), Disp: 21 tablet, Rfl: 0    simvastatin (ZOCOR) 20 mg tablet, Take 1 tablet (20 mg total) by mouth daily (Patient not taking: Reported on 3/22/2019), Disp: 90 tablet, Rfl: 0     Allergies   Allergen Reactions    Osphena [Ospemifene] Hives    Food     Nuts     Pollen Extract     Sesame Seed (Diagnostic)     Sulfamethoxazole-Trimethoprim Headache    Wheat Bran        Review of Systems   Constitutional: Positive for fatigue  HENT: Negative  Eyes: Negative  Respiratory: Negative  Cardiovascular: Negative  Gastrointestinal: Negative  Genitourinary: Negative  Musculoskeletal: Negative  Skin: Negative  Neurological: Negative  Psychiatric/Behavioral: Positive for sleep disturbance  The patient is nervous/anxious  Objective:      /68 (BP Location: Left arm, Patient Position: Sitting, Cuff Size: Standard)   Pulse 69   Temp 98 °F (36 7 °C) (Oral)   Ht 5' 5" (1 651 m)   Wt 74 8 kg (165 lb)   SpO2 98%   BMI 27 46 kg/m²          Physical Exam   Constitutional: She is oriented to person, place, and time  She appears well-developed and well-nourished  HENT:   Head: Normocephalic and atraumatic     Right Ear: External ear normal    Left Ear: External ear normal    Nose: Nose normal    Mouth/Throat: Oropharynx is clear and moist    Eyes: Pupils are equal, round, and reactive to light  Conjunctivae and EOM are normal    Neck: Normal range of motion  Neck supple  Cardiovascular: Normal rate, regular rhythm, normal heart sounds and intact distal pulses  Pulmonary/Chest: Effort normal and breath sounds normal    Abdominal: Soft  Bowel sounds are normal    Neurological: She is alert and oriented to person, place, and time  She has normal reflexes  Skin: Skin is warm and dry  Psychiatric: She has a normal mood and affect   Her behavior is normal  Judgment and thought content normal

## 2019-03-22 NOTE — PROGRESS NOTES
BMI Counseling: Body mass index is 27 46 kg/m²  Discussed the patient's BMI with her  The BMI is above average  BMI counseling and education was provided to the patient  Nutrition recommendations include consuming healthier snacks and moderation in carbohydrate intake

## 2019-03-23 ENCOUNTER — APPOINTMENT (OUTPATIENT)
Dept: LAB | Facility: HOSPITAL | Age: 54
End: 2019-03-23
Payer: COMMERCIAL

## 2019-03-23 DIAGNOSIS — R53.83 TIREDNESS: ICD-10-CM

## 2019-03-23 DIAGNOSIS — E03.9 HYPOTHYROIDISM, UNSPECIFIED TYPE: ICD-10-CM

## 2019-03-23 DIAGNOSIS — E55.9 VITAMIN D DEFICIENCY: ICD-10-CM

## 2019-03-23 LAB
25(OH)D3 SERPL-MCNC: 39.1 NG/ML (ref 30–100)
ALBUMIN SERPL BCP-MCNC: 4.3 G/DL (ref 3.5–5)
ALP SERPL-CCNC: 68 U/L (ref 46–116)
ALT SERPL W P-5'-P-CCNC: 31 U/L (ref 12–78)
ANION GAP SERPL CALCULATED.3IONS-SCNC: 4 MMOL/L (ref 4–13)
AST SERPL W P-5'-P-CCNC: 18 U/L (ref 5–45)
BILIRUB SERPL-MCNC: 0.49 MG/DL (ref 0.2–1)
BILIRUB UR QL STRIP: NEGATIVE
BUN SERPL-MCNC: 12 MG/DL (ref 5–25)
CALCIUM SERPL-MCNC: 9.4 MG/DL (ref 8.3–10.1)
CHLORIDE SERPL-SCNC: 104 MMOL/L (ref 100–108)
CLARITY UR: CLEAR
CO2 SERPL-SCNC: 28 MMOL/L (ref 21–32)
COLOR UR: YELLOW
CREAT SERPL-MCNC: 0.85 MG/DL (ref 0.6–1.3)
ERYTHROCYTE [DISTWIDTH] IN BLOOD BY AUTOMATED COUNT: 13.7 % (ref 11.6–15.1)
GFR SERPL CREATININE-BSD FRML MDRD: 77 ML/MIN/1.73SQ M
GLUCOSE P FAST SERPL-MCNC: 99 MG/DL (ref 65–99)
GLUCOSE UR STRIP-MCNC: NEGATIVE MG/DL
HCT VFR BLD AUTO: 38.7 % (ref 34.8–46.1)
HGB BLD-MCNC: 12.7 G/DL (ref 11.5–15.4)
HGB UR QL STRIP.AUTO: NEGATIVE
KETONES UR STRIP-MCNC: NEGATIVE MG/DL
LEUKOCYTE ESTERASE UR QL STRIP: NEGATIVE
MCH RBC QN AUTO: 28.7 PG (ref 26.8–34.3)
MCHC RBC AUTO-ENTMCNC: 32.8 G/DL (ref 31.4–37.4)
MCV RBC AUTO: 88 FL (ref 82–98)
NITRITE UR QL STRIP: NEGATIVE
PH UR STRIP.AUTO: 6.5 [PH]
PLATELET # BLD AUTO: 320 THOUSANDS/UL (ref 149–390)
PMV BLD AUTO: 11.5 FL (ref 8.9–12.7)
POTASSIUM SERPL-SCNC: 4.3 MMOL/L (ref 3.5–5.3)
PROT SERPL-MCNC: 8 G/DL (ref 6.4–8.2)
PROT UR STRIP-MCNC: NEGATIVE MG/DL
RBC # BLD AUTO: 4.42 MILLION/UL (ref 3.81–5.12)
SODIUM SERPL-SCNC: 136 MMOL/L (ref 136–145)
SP GR UR STRIP.AUTO: 1.01 (ref 1–1.03)
TSH SERPL DL<=0.05 MIU/L-ACNC: 3.99 UIU/ML (ref 0.36–3.74)
UROBILINOGEN UR QL STRIP.AUTO: 0.2 E.U./DL
WBC # BLD AUTO: 5.83 THOUSAND/UL (ref 4.31–10.16)

## 2019-03-23 PROCEDURE — 84443 ASSAY THYROID STIM HORMONE: CPT

## 2019-03-23 PROCEDURE — 82306 VITAMIN D 25 HYDROXY: CPT

## 2019-03-23 PROCEDURE — 85027 COMPLETE CBC AUTOMATED: CPT

## 2019-03-23 PROCEDURE — 36415 COLL VENOUS BLD VENIPUNCTURE: CPT

## 2019-03-23 PROCEDURE — 81003 URINALYSIS AUTO W/O SCOPE: CPT | Performed by: FAMILY MEDICINE

## 2019-03-23 PROCEDURE — 80053 COMPREHEN METABOLIC PANEL: CPT

## 2019-04-17 ENCOUNTER — TELEPHONE (OUTPATIENT)
Dept: OBGYN CLINIC | Facility: CLINIC | Age: 54
End: 2019-04-17

## 2019-04-17 DIAGNOSIS — Z12.39 BREAST CANCER SCREENING: Primary | ICD-10-CM

## 2019-04-30 ENCOUNTER — ANNUAL EXAM (OUTPATIENT)
Dept: OBGYN CLINIC | Facility: CLINIC | Age: 54
End: 2019-04-30
Payer: COMMERCIAL

## 2019-04-30 VITALS
SYSTOLIC BLOOD PRESSURE: 110 MMHG | DIASTOLIC BLOOD PRESSURE: 74 MMHG | HEIGHT: 65 IN | BODY MASS INDEX: 27.66 KG/M2 | WEIGHT: 166 LBS

## 2019-04-30 DIAGNOSIS — Z01.419 ENCOUNTER FOR ANNUAL ROUTINE GYNECOLOGICAL EXAMINATION: Primary | ICD-10-CM

## 2019-04-30 DIAGNOSIS — Z12.39 BREAST CANCER SCREENING: ICD-10-CM

## 2019-04-30 PROCEDURE — 99396 PREV VISIT EST AGE 40-64: CPT | Performed by: OBSTETRICS & GYNECOLOGY

## 2019-05-06 ENCOUNTER — OFFICE VISIT (OUTPATIENT)
Dept: FAMILY MEDICINE CLINIC | Facility: CLINIC | Age: 54
End: 2019-05-06
Payer: COMMERCIAL

## 2019-05-06 VITALS
HEART RATE: 64 BPM | BODY MASS INDEX: 26.66 KG/M2 | DIASTOLIC BLOOD PRESSURE: 74 MMHG | WEIGHT: 160 LBS | SYSTOLIC BLOOD PRESSURE: 126 MMHG | HEIGHT: 65 IN | TEMPERATURE: 97.5 F | OXYGEN SATURATION: 98 %

## 2019-05-06 DIAGNOSIS — E03.9 HYPOTHYROIDISM, UNSPECIFIED TYPE: ICD-10-CM

## 2019-05-06 DIAGNOSIS — R11.2 NAUSEA AND VOMITING, INTRACTABILITY OF VOMITING NOT SPECIFIED, UNSPECIFIED VOMITING TYPE: ICD-10-CM

## 2019-05-06 DIAGNOSIS — K52.9 GE (GASTROENTERITIS): Primary | ICD-10-CM

## 2019-05-06 PROCEDURE — 99213 OFFICE O/P EST LOW 20 MIN: CPT | Performed by: FAMILY MEDICINE

## 2019-05-06 RX ORDER — LEVOTHYROXINE SODIUM 0.03 MG/1
25 TABLET ORAL DAILY
Qty: 90 TABLET | Refills: 1 | Status: SHIPPED | OUTPATIENT
Start: 2019-05-06 | End: 2019-11-25 | Stop reason: SDUPTHER

## 2019-05-06 RX ORDER — ONDANSETRON 4 MG/1
4 TABLET, FILM COATED ORAL EVERY 12 HOURS PRN
Qty: 8 TABLET | Refills: 0 | Status: SHIPPED | OUTPATIENT
Start: 2019-05-06 | End: 2019-06-24

## 2019-05-07 ENCOUNTER — TELEPHONE (OUTPATIENT)
Dept: FAMILY MEDICINE CLINIC | Facility: CLINIC | Age: 54
End: 2019-05-07

## 2019-05-17 ENCOUNTER — HOSPITAL ENCOUNTER (OUTPATIENT)
Dept: RADIOLOGY | Age: 54
Discharge: HOME | End: 2019-05-17
Payer: COMMERCIAL

## 2019-05-17 VITALS — BODY MASS INDEX: 27.32 KG/M2 | HEIGHT: 65 IN | WEIGHT: 164 LBS

## 2019-05-17 DIAGNOSIS — Z12.39 BREAST CANCER SCREENING: ICD-10-CM

## 2019-05-17 PROCEDURE — 77063 BREAST TOMOSYNTHESIS BI: CPT

## 2019-05-17 PROCEDURE — 77067 SCR MAMMO BI INCL CAD: CPT

## 2019-06-21 ENCOUNTER — TELEPHONE (OUTPATIENT)
Dept: FAMILY MEDICINE CLINIC | Facility: CLINIC | Age: 54
End: 2019-06-21

## 2019-06-21 DIAGNOSIS — R09.81 CONGESTION OF NASAL SINUS: Primary | ICD-10-CM

## 2019-06-21 RX ORDER — FLUTICASONE PROPIONATE 50 MCG
1 SPRAY, SUSPENSION (ML) NASAL DAILY
Qty: 1 BOTTLE | Refills: 2 | Status: SHIPPED | OUTPATIENT
Start: 2019-06-21 | End: 2020-03-12 | Stop reason: SDDI

## 2019-06-24 ENCOUNTER — OFFICE VISIT (OUTPATIENT)
Dept: FAMILY MEDICINE CLINIC | Facility: CLINIC | Age: 54
End: 2019-06-24
Payer: COMMERCIAL

## 2019-06-24 VITALS
DIASTOLIC BLOOD PRESSURE: 68 MMHG | HEART RATE: 82 BPM | BODY MASS INDEX: 27.36 KG/M2 | TEMPERATURE: 98.7 F | HEIGHT: 65 IN | OXYGEN SATURATION: 97 % | SYSTOLIC BLOOD PRESSURE: 112 MMHG | WEIGHT: 164.2 LBS

## 2019-06-24 DIAGNOSIS — J02.9 SORE THROAT: ICD-10-CM

## 2019-06-24 DIAGNOSIS — J40 BRONCHITIS: Primary | ICD-10-CM

## 2019-06-24 DIAGNOSIS — R05.9 COUGH: ICD-10-CM

## 2019-06-24 PROCEDURE — 1036F TOBACCO NON-USER: CPT | Performed by: FAMILY MEDICINE

## 2019-06-24 PROCEDURE — 99213 OFFICE O/P EST LOW 20 MIN: CPT | Performed by: FAMILY MEDICINE

## 2019-06-24 PROCEDURE — 3008F BODY MASS INDEX DOCD: CPT | Performed by: FAMILY MEDICINE

## 2019-06-24 RX ORDER — HYDROCODONE POLISTIREX AND CHLORPHENIRAMINE POLISTIREX 10; 8 MG/5ML; MG/5ML
5 SUSPENSION, EXTENDED RELEASE ORAL EVERY 12 HOURS PRN
Qty: 120 ML | Refills: 0 | Status: SHIPPED | OUTPATIENT
Start: 2019-06-24 | End: 2020-03-03

## 2019-06-24 RX ORDER — METHYLPREDNISOLONE 4 MG/1
TABLET ORAL
Qty: 21 EACH | Refills: 0 | Status: SHIPPED | OUTPATIENT
Start: 2019-06-24 | End: 2020-03-03

## 2019-06-24 RX ORDER — AZITHROMYCIN 250 MG/1
500 TABLET, FILM COATED ORAL EVERY 24 HOURS
Qty: 10 TABLET | Refills: 0 | Status: SHIPPED | OUTPATIENT
Start: 2019-06-24 | End: 2019-06-29

## 2019-07-30 ENCOUNTER — VBI (OUTPATIENT)
Dept: ADMINISTRATIVE | Facility: OTHER | Age: 54
End: 2019-07-30

## 2019-07-30 NOTE — TELEPHONE ENCOUNTER
Initial attempt made and documented for CRC Screening Patient/Employee Outreach on 07/30/19  Follow-up outreach scheduled to be completed within 7 business days      Asuncion Bacon MA

## 2019-11-15 ENCOUNTER — TELEPHONE (OUTPATIENT)
Dept: FAMILY MEDICINE CLINIC | Facility: CLINIC | Age: 54
End: 2019-11-15

## 2019-11-15 DIAGNOSIS — Z12.11 COLON CANCER SCREENING: Primary | ICD-10-CM

## 2019-11-25 DIAGNOSIS — E03.9 HYPOTHYROIDISM, UNSPECIFIED TYPE: ICD-10-CM

## 2019-11-25 DIAGNOSIS — E03.9 HYPOTHYROIDISM, UNSPECIFIED TYPE: Primary | ICD-10-CM

## 2019-11-25 RX ORDER — LEVOTHYROXINE SODIUM 0.03 MG/1
25 TABLET ORAL DAILY
Qty: 90 TABLET | Refills: 0 | Status: SHIPPED | OUTPATIENT
Start: 2019-11-25 | End: 2020-02-20 | Stop reason: SDUPTHER

## 2019-11-25 NOTE — TELEPHONE ENCOUNTER
Patient called to get a refill of Levothyroxine 25 mcg 1 po qd to be sent to [facility]  Patient told she should have repeated the TSH lab test, told will put order in for her to get test done this week, she stated she understood

## 2019-11-26 ENCOUNTER — TELEPHONE (OUTPATIENT)
Dept: FAMILY MEDICINE CLINIC | Facility: CLINIC | Age: 54
End: 2019-11-26

## 2019-11-26 ENCOUNTER — APPOINTMENT (OUTPATIENT)
Dept: LAB | Facility: HOSPITAL | Age: 54
End: 2019-11-26
Payer: COMMERCIAL

## 2019-11-26 DIAGNOSIS — E03.9 HYPOTHYROIDISM, UNSPECIFIED TYPE: ICD-10-CM

## 2019-11-26 LAB
T3 SERPL-MCNC: 0.8 NG/ML (ref 0.6–1.8)
T4 FREE SERPL-MCNC: 0.67 NG/DL (ref 0.76–1.46)
TSH SERPL DL<=0.05 MIU/L-ACNC: 3.37 UIU/ML (ref 0.36–3.74)

## 2019-11-26 PROCEDURE — 84480 ASSAY TRIIODOTHYRONINE (T3): CPT

## 2019-11-26 PROCEDURE — 36415 COLL VENOUS BLD VENIPUNCTURE: CPT

## 2019-11-26 PROCEDURE — 84443 ASSAY THYROID STIM HORMONE: CPT

## 2019-11-26 PROCEDURE — 84439 ASSAY OF FREE THYROXINE: CPT

## 2019-11-26 NOTE — TELEPHONE ENCOUNTER
Patient had thyroid blood work done, she was advised that TSH was in normal range  Please check result  She is currently taking Levothyroxine 25 mcg

## 2019-12-19 DIAGNOSIS — R12 HEART BURN: ICD-10-CM

## 2019-12-20 DIAGNOSIS — R12 HEART BURN: ICD-10-CM

## 2019-12-20 NOTE — TELEPHONE ENCOUNTER
Patient is requesting refills for 90 day supply of Omeprazole to be sent to [facility] in [place]

## 2019-12-23 RX ORDER — OMEPRAZOLE 40 MG/1
40 CAPSULE, DELAYED RELEASE ORAL DAILY
Qty: 90 CAPSULE | Refills: 1 | Status: SHIPPED | OUTPATIENT
Start: 2019-12-23 | End: 2020-03-12 | Stop reason: CLARIF

## 2019-12-24 RX ORDER — OMEPRAZOLE 40 MG/1
CAPSULE, DELAYED RELEASE ORAL
Qty: 90 CAPSULE | Refills: 0 | OUTPATIENT
Start: 2019-12-24

## 2020-02-20 DIAGNOSIS — E03.9 HYPOTHYROIDISM, UNSPECIFIED TYPE: ICD-10-CM

## 2020-02-20 NOTE — TELEPHONE ENCOUNTER
Patient is requesting refills for Levothyroxine 25 mcg to be sent to [facility] in [place]

## 2020-02-21 RX ORDER — LEVOTHYROXINE SODIUM 0.03 MG/1
25 TABLET ORAL DAILY
Qty: 90 TABLET | Refills: 1 | Status: SHIPPED | OUTPATIENT
Start: 2020-02-21 | End: 2020-10-14 | Stop reason: SDUPTHER

## 2020-03-03 ENCOUNTER — OFFICE VISIT (OUTPATIENT)
Dept: FAMILY MEDICINE CLINIC | Facility: CLINIC | Age: 55
End: 2020-03-03
Payer: COMMERCIAL

## 2020-03-03 VITALS
DIASTOLIC BLOOD PRESSURE: 76 MMHG | OXYGEN SATURATION: 98 % | BODY MASS INDEX: 28.52 KG/M2 | HEART RATE: 66 BPM | TEMPERATURE: 98.5 F | HEIGHT: 65 IN | SYSTOLIC BLOOD PRESSURE: 114 MMHG | WEIGHT: 171.2 LBS

## 2020-03-03 DIAGNOSIS — R14.0 BLOATING: ICD-10-CM

## 2020-03-03 DIAGNOSIS — K21.9 GASTRO-ESOPHAGEAL REFLUX DISEASE WITHOUT ESOPHAGITIS: ICD-10-CM

## 2020-03-03 DIAGNOSIS — R11.0 NAUSEA: Primary | ICD-10-CM

## 2020-03-03 PROCEDURE — 1036F TOBACCO NON-USER: CPT | Performed by: FAMILY MEDICINE

## 2020-03-03 PROCEDURE — 3008F BODY MASS INDEX DOCD: CPT | Performed by: FAMILY MEDICINE

## 2020-03-03 PROCEDURE — 99214 OFFICE O/P EST MOD 30 MIN: CPT | Performed by: FAMILY MEDICINE

## 2020-03-03 NOTE — PROGRESS NOTES
Chief Complaint   Patient presents with    Nausea     after eating, but has it all the time x 3 weeks     fullness as if food is not moving    Fatigue    Diarrhea     that comes and goes      Assessment/Plan:  US abdomen and pelvis  GI evaluation  Reviewed diet with present discomfort  Diagnoses and all orders for this visit:    Nausea  -     CBC and Platelet; Future  -     Basic metabolic panel; Future  -     Hepatic function panel; Future  -     Lipase; Future  -     US abdomen complete; Future  -     US pelvis complete w transvaginal; Future  -     Ambulatory referral to Gastroenterology; Future    Bloating  -     CBC and Platelet; Future  -     Basic metabolic panel; Future  -     Hepatic function panel; Future  -     Lipase; Future  -     US abdomen complete; Future  -     US pelvis complete w transvaginal; Future  -     Ambulatory referral to Gastroenterology; Future    Gastro-esophageal reflux disease without esophagitis  -     Ambulatory referral to Gastroenterology; Future    Other orders  -     medium chain triglycerides (MCT OIL) oil; Take 15 mL by mouth 3 (three) times a day          Subjective:      Patient ID: Shanae Griffin is a 60 y o  female  Nausea, bloating, reflux, loose stool (not today - formed solid)  Feels sick every time eat's  Taking the Omeprazole  Patient states feels like not digesting foods properly  Had EGD years ago  Discomfort limited to epigastric area  Gets yearly Pap's and pelvics  PSH: [place]  The following portions of the patient's history were reviewed and updated as appropriate: allergies, current medications, past medical history, past social history, past surgical history and problem list   I have spent 25 minutes with Patient  today in which greater than 50% of this time was spent in counseling/coordination of care regarding Intructions for management, Risk factor reductions and Impressions  Review of Systems   Constitutional: Negative  HENT: Negative  Eyes: Negative  Respiratory: Negative  Cardiovascular: Negative  Gastrointestinal: Positive for abdominal distention and nausea  Negative for vomiting  Bloating, reflux (heart burn)  Genitourinary: Negative  Musculoskeletal: Negative  Skin: Negative  Neurological: Negative  Psychiatric/Behavioral: Negative  Objective:      /76 (BP Location: Left arm, Patient Position: Sitting, Cuff Size: Standard)   Pulse 66   Temp 98 5 °F (36 9 °C) (Oral)   Ht 5' 5" (1 651 m)   Wt 77 7 kg (171 lb 3 2 oz)   SpO2 98%   BMI 28 49 kg/m²       Current Outpatient Medications:     ALPHA LIPOIC ACID PO, Take by mouth, Disp: , Rfl:     Cholecalciferol (VITAMIN D3) 2000 units capsule, Take 2,000 Units by mouth daily, Disp: , Rfl:     Coenzyme Q10 (CO Q 10 PO), Take by mouth, Disp: , Rfl:     levothyroxine 25 mcg tablet, Take 1 tablet (25 mcg total) by mouth daily, Disp: 90 tablet, Rfl: 1    medium chain triglycerides (MCT OIL) oil, Take 15 mL by mouth 3 (three) times a day, Disp: , Rfl:     Multiple Vitamin (MULTIVITAMINS PO), Take 1 tablet by mouth daily, Disp: , Rfl:     Omega-3 Fatty Acids (OMEGA-3 FISH OIL) 1200 MG CAPS, Take by mouth, Disp: , Rfl:     omeprazole (PriLOSEC) 40 MG capsule, Take 1 capsule (40 mg total) by mouth daily, Disp: 90 capsule, Rfl: 1    Papaya Enzymes CHEW, Chew, Disp: , Rfl:     Probiotic Product (PROBIOTIC-10) CAPS, Take by mouth, Disp: , Rfl:     Cetirizine HCl (ZYRTEC ALLERGY) 10 MG CAPS, Take 1 tablet by mouth daily, Disp: , Rfl:     fluticasone (FLONASE) 50 mcg/act nasal spray, 1 spray into each nostril daily (Patient not taking: Reported on 3/3/2020), Disp: 1 Bottle, Rfl: 2    LORazepam (ATIVAN) 0 5 mg tablet, Take 1 tablet (0 5 mg total) by mouth 2 (two) times a day 1 - 2 po daily as needed   (Patient not taking: Reported on 3/3/2020), Disp: 90 tablet, Rfl: 1     Allergies   Allergen Reactions    Osphena [Ospemifene] Hives  Food     Nuts     Pollen Extract     Sesame Seed (Diagnostic)     Sulfamethoxazole-Trimethoprim Headache    Wheat Bran         Physical Exam   Constitutional: She is oriented to person, place, and time  She appears well-developed and well-nourished  HENT:   Head: Normocephalic and atraumatic  Right Ear: External ear normal    Left Ear: External ear normal    Nose: Nose normal    Mouth/Throat: Oropharynx is clear and moist    Eyes: Pupils are equal, round, and reactive to light  Conjunctivae and EOM are normal    Neck: Normal range of motion  Neck supple  Cardiovascular: Normal rate, regular rhythm, normal heart sounds and intact distal pulses  Pulmonary/Chest: Effort normal and breath sounds normal    Abdominal: Soft  Bowel sounds are normal  She exhibits no mass  There is tenderness  There is no rebound and no guarding  Epigastrum with tendernes to palpation - mild   Neurological: She is alert and oriented to person, place, and time  She has normal reflexes  Skin: Skin is warm and dry  Psychiatric: She has a normal mood and affect   Her behavior is normal  Judgment and thought content normal

## 2020-03-04 ENCOUNTER — APPOINTMENT (OUTPATIENT)
Dept: LAB | Facility: HOSPITAL | Age: 55
End: 2020-03-04
Payer: COMMERCIAL

## 2020-03-04 DIAGNOSIS — R14.0 BLOATING: ICD-10-CM

## 2020-03-04 DIAGNOSIS — R11.0 NAUSEA: ICD-10-CM

## 2020-03-04 LAB
ALBUMIN SERPL BCP-MCNC: 4.1 G/DL (ref 3.5–5)
ALP SERPL-CCNC: 66 U/L (ref 46–116)
ALT SERPL W P-5'-P-CCNC: 36 U/L (ref 12–78)
ANION GAP SERPL CALCULATED.3IONS-SCNC: 7 MMOL/L (ref 4–13)
AST SERPL W P-5'-P-CCNC: 18 U/L (ref 5–45)
BILIRUB DIRECT SERPL-MCNC: 0.09 MG/DL (ref 0–0.2)
BILIRUB SERPL-MCNC: 0.47 MG/DL (ref 0.2–1)
BUN SERPL-MCNC: 15 MG/DL (ref 5–25)
CALCIUM SERPL-MCNC: 9.5 MG/DL (ref 8.3–10.1)
CHLORIDE SERPL-SCNC: 105 MMOL/L (ref 100–108)
CO2 SERPL-SCNC: 24 MMOL/L (ref 21–32)
CREAT SERPL-MCNC: 0.82 MG/DL (ref 0.6–1.3)
ERYTHROCYTE [DISTWIDTH] IN BLOOD BY AUTOMATED COUNT: 13.5 % (ref 11.6–15.1)
GFR SERPL CREATININE-BSD FRML MDRD: 80 ML/MIN/1.73SQ M
GLUCOSE P FAST SERPL-MCNC: 100 MG/DL (ref 65–99)
HCT VFR BLD AUTO: 37.5 % (ref 34.8–46.1)
HGB BLD-MCNC: 12.3 G/DL (ref 11.5–15.4)
LIPASE SERPL-CCNC: 253 U/L (ref 73–393)
MCH RBC QN AUTO: 28.6 PG (ref 26.8–34.3)
MCHC RBC AUTO-ENTMCNC: 32.8 G/DL (ref 31.4–37.4)
MCV RBC AUTO: 87 FL (ref 82–98)
PLATELET # BLD AUTO: 359 THOUSANDS/UL (ref 149–390)
PMV BLD AUTO: 10.6 FL (ref 8.9–12.7)
POTASSIUM SERPL-SCNC: 4.1 MMOL/L (ref 3.5–5.3)
PROT SERPL-MCNC: 8 G/DL (ref 6.4–8.2)
RBC # BLD AUTO: 4.3 MILLION/UL (ref 3.81–5.12)
SODIUM SERPL-SCNC: 136 MMOL/L (ref 136–145)
WBC # BLD AUTO: 6.08 THOUSAND/UL (ref 4.31–10.16)

## 2020-03-04 PROCEDURE — 85027 COMPLETE CBC AUTOMATED: CPT

## 2020-03-04 PROCEDURE — 83690 ASSAY OF LIPASE: CPT

## 2020-03-04 PROCEDURE — 80076 HEPATIC FUNCTION PANEL: CPT

## 2020-03-04 PROCEDURE — 80048 BASIC METABOLIC PNL TOTAL CA: CPT

## 2020-03-04 PROCEDURE — 36415 COLL VENOUS BLD VENIPUNCTURE: CPT

## 2020-03-06 ENCOUNTER — HOSPITAL ENCOUNTER (OUTPATIENT)
Dept: RADIOLOGY | Facility: HOSPITAL | Age: 55
Discharge: HOME | End: 2020-03-06
Payer: COMMERCIAL

## 2020-03-06 DIAGNOSIS — R11.0 NAUSEA: ICD-10-CM

## 2020-03-06 DIAGNOSIS — R14.0 BLOATING: ICD-10-CM

## 2020-03-06 PROCEDURE — 76856 US EXAM PELVIC COMPLETE: CPT

## 2020-03-06 PROCEDURE — 76830 TRANSVAGINAL US NON-OB: CPT

## 2020-03-08 ENCOUNTER — HOSPITAL ENCOUNTER (OUTPATIENT)
Dept: RADIOLOGY | Facility: HOSPITAL | Age: 55
Discharge: HOME | End: 2020-03-08
Payer: COMMERCIAL

## 2020-03-08 DIAGNOSIS — R14.0 BLOATING: ICD-10-CM

## 2020-03-08 DIAGNOSIS — R11.0 NAUSEA: ICD-10-CM

## 2020-03-08 PROCEDURE — 76700 US EXAM ABDOM COMPLETE: CPT

## 2020-03-10 ENCOUNTER — TRANSCRIBE ORDERS (OUTPATIENT)
Dept: LAB | Facility: HOSPITAL | Age: 55
End: 2020-03-10

## 2020-03-11 ENCOUNTER — TELEPHONE (OUTPATIENT)
Dept: FAMILY MEDICINE CLINIC | Facility: CLINIC | Age: 55
End: 2020-03-11

## 2020-03-11 DIAGNOSIS — R11.0 NAUSEA: Primary | ICD-10-CM

## 2020-03-11 RX ORDER — ONDANSETRON 4 MG/1
4 TABLET, FILM COATED ORAL EVERY 8 HOURS PRN
Qty: 20 TABLET | Refills: 0 | Status: SHIPPED | OUTPATIENT
Start: 2020-03-11 | End: 2020-03-12 | Stop reason: CLARIF

## 2020-03-11 NOTE — TELEPHONE ENCOUNTER
Pt called started that she is still having nausea and will like to know if she can have Zofran  She is going to see GI tomorrow but she is at work and feeling nauseas

## 2020-03-12 ENCOUNTER — APPOINTMENT (OUTPATIENT)
Dept: LAB | Facility: HOSPITAL | Age: 55
End: 2020-03-12
Attending: INTERNAL MEDICINE
Payer: COMMERCIAL

## 2020-03-12 ENCOUNTER — OFFICE VISIT (OUTPATIENT)
Dept: GASTROENTEROLOGY | Facility: CLINIC | Age: 55
End: 2020-03-12
Payer: COMMERCIAL

## 2020-03-12 ENCOUNTER — APPOINTMENT (OUTPATIENT)
Dept: LAB | Facility: HOSPITAL | Age: 55
End: 2020-03-12

## 2020-03-12 ENCOUNTER — TRANSCRIBE ORDERS (OUTPATIENT)
Dept: GASTROENTEROLOGY | Facility: CLINIC | Age: 55
End: 2020-03-12

## 2020-03-12 VITALS
HEART RATE: 69 BPM | DIASTOLIC BLOOD PRESSURE: 73 MMHG | SYSTOLIC BLOOD PRESSURE: 113 MMHG | WEIGHT: 169 LBS | TEMPERATURE: 98.3 F | BODY MASS INDEX: 28.16 KG/M2 | HEIGHT: 65 IN

## 2020-03-12 DIAGNOSIS — K21.9 GASTROESOPHAGEAL REFLUX DISEASE, ESOPHAGITIS PRESENCE NOT SPECIFIED: ICD-10-CM

## 2020-03-12 DIAGNOSIS — R10.13 EPIGASTRIC ABDOMINAL PAIN: ICD-10-CM

## 2020-03-12 DIAGNOSIS — Z12.11 COLON CANCER SCREENING: Primary | ICD-10-CM

## 2020-03-12 DIAGNOSIS — Z00.8 ENCOUNTER FOR OTHER GENERAL EXAMINATION: Primary | ICD-10-CM

## 2020-03-12 DIAGNOSIS — K58.2 IRRITABLE BOWEL SYNDROME WITH BOTH CONSTIPATION AND DIARRHEA: ICD-10-CM

## 2020-03-12 DIAGNOSIS — Z00.8 ENCOUNTER FOR OTHER GENERAL EXAMINATION: ICD-10-CM

## 2020-03-12 LAB
CHOLEST SERPL-MCNC: 335 MG/DL (ref 50–200)
EST. AVERAGE GLUCOSE BLD GHB EST-MCNC: 117 MG/DL
HBA1C MFR BLD: 5.7 %
HDLC SERPL-MCNC: 41 MG/DL
NONHDLC SERPL-MCNC: 294 MG/DL
TRIGL SERPL-MCNC: 536 MG/DL

## 2020-03-12 PROCEDURE — 80061 LIPID PANEL: CPT

## 2020-03-12 PROCEDURE — 86255 FLUORESCENT ANTIBODY SCREEN: CPT

## 2020-03-12 PROCEDURE — 83036 HEMOGLOBIN GLYCOSYLATED A1C: CPT

## 2020-03-12 PROCEDURE — 83516 IMMUNOASSAY NONANTIBODY: CPT

## 2020-03-12 PROCEDURE — 82784 ASSAY IGA/IGD/IGG/IGM EACH: CPT

## 2020-03-12 PROCEDURE — 36415 COLL VENOUS BLD VENIPUNCTURE: CPT

## 2020-03-12 PROCEDURE — 99244 OFF/OP CNSLTJ NEW/EST MOD 40: CPT | Performed by: INTERNAL MEDICINE

## 2020-03-12 NOTE — PATIENT INSTRUCTIONS
EGD/COLON scheduled with Dr Franco at [facility] on 4/6/2020  Komal gave pt verbal instructions/paper work given  Prep Suprep

## 2020-03-12 NOTE — PROGRESS NOTES
[facility] - Outpatient Consultation  Ruby Camp 60 y o  female MRN: [id number]  Encounter: [id number]          ASSESSMENT AND PLAN:  Ms Cosat Gatica was seen today for nausea and abdominal pain  Diagnoses and all orders for this visit:    Colon cancer screening: I will schedule her for screening colonoscopy since it has been 10 years since her last colonoscopy  I will schedule her for colonoscopy  Risks and benefits of the procedure were discussed  Risks include but are not limited to bleeding, perforation, missed lesion  She is agreeable to the procedure  Bowel prep instructions given  Gastroesophageal reflux disease, esophagitis presence not specified: Due to her reported longstanding history of epigastric burning pain, as well as epigastric bloating and pain as discussed below, I will also perform EGD to assess for esophagitis, Napier's esophagus, gastritis or hiatal hernia  Epigastric abdominal pain: She reports a history of gluten sensitivity  She has not been tested for celiac disease in the past due to gluten free diet  However, she is eating pretzels on occasion now, so I will order celiac disease panel  I will perform EGD to assess for other causes of her epigastric bloating and discomfort and collect biopsy for celiac disease  Irritable bowel syndrome with both constipation and diarrhea: She reports a longstanding history of alternating diarrhea and constipation with worsening abdominal pain and nausea for the past 3 weeks  She denies vomiting or hematochezia  This is likely irritable bowel syndrome, but I will assess for other causes of diarrhea and constipation during colonoscopy, as above  Follow-up after endoscopy    ______________________________________________________________________    HPI:  Ruby Camp is a 60 y o  female referred by Dr Nathalie Bhakta for a colon cancer screening cancer consult  Her last colonoscopy was 10 yrs ago       She has been feeling lethargic, shaky and nauseous for 3 weeks  She reports cramping and reduced appetite  She feels feverish at times  She denies vomiting, recent travel outside of the country or runny nose  She reports a long history of epigastric burning pain and of alternating diarrhea and constipation with pebble-like stool  She reports gluten sensitivity but was told celiac test would not work in the past because she was eating gluten free; she is currently eating pretzels on occasion  She stopped all medication and her probiotic, except synthroid, a few weeks ago to determine if medications were causing her to feel ill, but her symptoms have continued  REVIEW OF SYSTEMS:    CONSTITUTIONAL: Denies any fever, chills, rigors, and weight loss  HEENT: No earache or tinnitus  Denies hearing loss or visual disturbances  CARDIOVASCULAR: No chest pain or palpitations  RESPIRATORY: Denies any cough, hemoptysis, shortness of breath or dyspnea on exertion  GASTROINTESTINAL: As noted in the History of Present Illness  GENITOURINARY: No problems with urination  Denies any hematuria or dysuria  NEUROLOGIC: No dizziness or vertigo, denies headaches  MUSCULOSKELETAL: Denies any muscle or joint pain  SKIN: Denies skin rashes or itching  ENDOCRINE: Denies excessive thirst  Denies intolerance to heat or cold  PSYCHOSOCIAL: Denies depression or anxiety  Denies any recent memory loss         Historical Information   Past Medical History:   Diagnosis Date    Cervicalgia     last assessed: 10/23/2012    Fibroid 01/2015    US reveals 1 8 cm rt intramural, 4 8 pedunculated fibroid rt adnexa    Insomnia      Past Surgical History:   Procedure Laterality Date    LAPAROSCOPIC CHOLECYSTECTOMY       Social History   Social History     Substance and Sexual Activity   Alcohol Use Yes    Comment: social     Social History     Substance and Sexual Activity   Drug Use No     Social History     Tobacco Use   Smoking Status Never Smoker   Smokeless Tobacco Never Used     Family History   Problem Relation Age of Onset    Stroke Mother     Stroke Father     Stroke Brother     Diabetes Family     Hyperlipidemia Family     Hypertension Family     Breast cancer Cousin 48    No Known Problems Maternal Aunt     No Known Problems Maternal Aunt     No Known Problems Maternal Aunt     No Known Problems Maternal Aunt     No Known Problems Paternal Aunt        Meds/Allergies       Current Outpatient Medications:     levothyroxine 25 mcg tablet    Multiple Vitamin (MULTIVITAMINS PO)    Na Sulfate-K Sulfate-Mg Sulf (Suprep Bowel Prep Kit) 17 5-3 13-1 6 GM/177ML SOLN    Omega-3 Fatty Acids (OMEGA-3 FISH OIL) 1200 MG CAPS    Allergies   Allergen Reactions    Osphena [Ospemifene] Hives    Food     Nuts     Pollen Extract     Sesame Seed (Diagnostic)     Sulfamethoxazole-Trimethoprim Headache    Wheat Bran            Objective     Blood pressure 113/73, pulse 69, temperature 98 3 °F (36 8 °C), temperature source Tympanic, height 5' 5" (1 651 m), weight 76 7 kg (169 lb), not currently breastfeeding  Body mass index is 28 12 kg/m²  PHYSICAL EXAM:      General Appearance:   Alert, cooperative, no distress   HEENT:   Normocephalic, atraumatic, anicteric      Neck:  Supple, symmetrical, trachea midline   Lungs:   Clear to auscultation bilaterally; no rales, rhonchi or wheezing; respirations unlabored    Heart[de-identified]   Regular rate and rhythm; no murmur, rub, or gallop  Abdomen:   Soft, non-tender, non-distended; normal bowel sounds; no masses, no organomegaly    Genitalia:   Deferred    Rectal:   Deferred    Extremities:  No cyanosis, clubbing or edema    Pulses:  2+ and symmetric    Skin:  No jaundice, rashes, or lesions    Lymph nodes:  No palpable cervical lymphadenopathy        Lab Results:   No visits with results within 1 Day(s) from this visit     Latest known visit with results is:   Appointment on 03/04/2020   Component Date Value    WBC 03/04/2020 6 08     RBC 03/04/2020 4 30     Hemoglobin 03/04/2020 12 3     Hematocrit 03/04/2020 37 5     MCV 03/04/2020 87     MCH 03/04/2020 28 6     MCHC 03/04/2020 32 8     RDW 03/04/2020 13 5     Platelets 07/28/3191 359     MPV 03/04/2020 10 6     Sodium 03/04/2020 136     Potassium 03/04/2020 4 1     Chloride 03/04/2020 105     CO2 03/04/2020 24     ANION GAP 03/04/2020 7     BUN 03/04/2020 15     Creatinine 03/04/2020 0 82     Glucose, Fasting 03/04/2020 100*    Calcium 03/04/2020 9 5     eGFR 03/04/2020 80     Total Bilirubin 03/04/2020 0 47     Bilirubin, Direct 03/04/2020 0 09     Alkaline Phosphatase 03/04/2020 66     AST 03/04/2020 18     ALT 03/04/2020 36     Total Protein 03/04/2020 8 0     Albumin 03/04/2020 4 1     Lipase 03/04/2020 253          Radiology Results:   Us Abdomen Complete    Result Date: 3/12/2020  Narrative: ABDOMEN ULTRASOUND, COMPLETE INDICATION:   R11 0: Nausea R14 0: Abdominal distension (gaseous)  Damian Edwards note 3/3/20 reviewed, nausea and bloating  GERD  COMPARISON: Bilateral quadrant ultrasound 3/6/14 TECHNIQUE:   Real-time ultrasound of the abdomen was performed with a curvilinear transducer with both volumetric sweeps and still imaging techniques  FINDINGS: PANCREAS:  Visualized portions of the pancreas are within normal limits  AORTA AND IVC:  Visualized portions are normal for patient age  LIVER: Size:   The liver measures 16 5 cm in the midclavicular line  Contour:  Surface contour is smooth  Parenchyma:  Echogenicity and echotexture are within normal limits  No evidence of suspicious mass  Limited imaging of the main portal vein shows it to be patent and hepatopetal  BILIARY: Patient has undergone cholecystectomy  No intrahepatic biliary dilatation  CBD measures 8 mm and tapers to 4 mm No choledocholithiasis  KIDNEY: Right kidney measures 9 3 cm  Within normal limits  Left kidney measures 10 1 cm  Within normal limits  SPLEEN: Measures 9 3 cm  Within normal limits  ASCITES:  None  Impression: No significant abnormality Workstation performed: [id number]     Us Pelvis Complete W Transvaginal    Result Date: 3/10/2020  Narrative: PELVIC ULTRASOUND, COMPLETE INDICATION:  60years old  R11 0: Nausea R14 0: Abdominal distension (gaseous)  History of uterine fibroids  Bloating  Nausea  Patient postmenopausal  COMPARISON: Pelvic ultrasound January 12, 2015 TECHNIQUE:   Transabdominal pelvic ultrasound was performed in sagittal and transverse planes with a curvilinear transducer  Additional transvaginal imaging was performed to better evaluate the endometrium and ovaries  Imaging included volumetric sweeps as well as traditional still imaging technique  FINDINGS: UTERUS: The uterus is anteverted in position, measuring 4 9 x 1 7 x 2 4 cm  The uterus is atrophic  Echogenicity heterogeneous  4 5 x 2 8 x 4 4 cm pedunculated fibroid extending into the right adnexa (previously 4 8 x 3 3 x 5 1 cm)  The cervix shows no suspicious abnormality  ENDOMETRIUM:  Normal caliber of 2 mm  Homogeneous and normal in appearance  OVARIES/ADNEXA: Neither ovary is identified    No suspicious adnexal mass or loculated collections  There is no free fluid  Impression: 1  Mildly atrophic uterus with stable pedunculated fibroid extending into the right adnexa  Findings are similar to the prior study  2   Nonvisualization of the ovaries  Workstation performed: [id number]     ______________________________    Attestation:   By signing my name below, Mayela Coles, attest that this documentation has been prepared under the direction and in the presence of MARTINEZ Cardenas  Electronically Signed: Maria Isabel Mccabe  3/12/2020       ILilliam, personally performed the services described in this documentation  All medical record entries made by the maria isabel were at my direction and in my presence   I have reviewed the chart and discharge instructions and agree that the record reflects my personal performance and is accurate and complete   MARTINEZ Moreno  3/12/2020

## 2020-03-14 LAB
ENDOMYSIUM IGA SER QL: NEGATIVE
GLIADIN PEPTIDE IGA SER-ACNC: 43 UNITS (ref 0–19)
GLIADIN PEPTIDE IGG SER-ACNC: 12 UNITS (ref 0–19)
IGA SERPL-MCNC: 103 MG/DL (ref 87–352)
TTG IGA SER-ACNC: <2 U/ML (ref 0–3)
TTG IGG SER-ACNC: <2 U/ML (ref 0–5)

## 2020-03-30 ENCOUNTER — TELEPHONE (OUTPATIENT)
Dept: FAMILY MEDICINE CLINIC | Facility: CLINIC | Age: 55
End: 2020-03-30

## 2020-03-30 NOTE — TELEPHONE ENCOUNTER
Patient left a message about elevated cholesterol and triglycerides  She would like to know if at this time you will be prescribing a statin?

## 2020-03-31 NOTE — TELEPHONE ENCOUNTER
Patient states she does not eat much processed foods, she states she has a gluten allergy so she stays away from cakes, breads, pastas which is why she is concerned  She states she is not perfect and very rarely she will have some pretzels

## 2020-03-31 NOTE — TELEPHONE ENCOUNTER
Diet changes first   Processed foods eg Flour products - breads, pasta, sweets - next visit will give a list

## 2020-06-22 ENCOUNTER — PREP FOR PROCEDURE (OUTPATIENT)
Dept: GASTROENTEROLOGY | Facility: CLINIC | Age: 55
End: 2020-06-22

## 2020-06-22 DIAGNOSIS — Z20.822 ENCOUNTER FOR LABORATORY TESTING FOR COVID-19 VIRUS: Primary | ICD-10-CM

## 2020-07-02 ENCOUNTER — ANESTHESIA EVENT (OUTPATIENT)
Dept: GASTROENTEROLOGY | Facility: AMBULARY SURGERY CENTER | Age: 55
End: 2020-07-02

## 2020-07-09 ENCOUNTER — TELEPHONE (OUTPATIENT)
Dept: GASTROENTEROLOGY | Facility: CLINIC | Age: 55
End: 2020-07-09

## 2020-07-09 DIAGNOSIS — Z20.822 ENCOUNTER FOR LABORATORY TESTING FOR COVID-19 VIRUS: ICD-10-CM

## 2020-07-09 PROCEDURE — U0003 INFECTIOUS AGENT DETECTION BY NUCLEIC ACID (DNA OR RNA); SEVERE ACUTE RESPIRATORY SYNDROME CORONAVIRUS 2 (SARS-COV-2) (CORONAVIRUS DISEASE [COVID-19]), AMPLIFIED PROBE TECHNIQUE, MAKING USE OF HIGH THROUGHPUT TECHNOLOGIES AS DESCRIBED BY CMS-2020-01-R: HCPCS | Performed by: OBSTETRICS & GYNECOLOGY

## 2020-07-10 LAB
INPATIENT: NORMAL
SARS-COV-2 RNA SPEC QL NAA+PROBE: NOT DETECTED

## 2020-07-15 RX ORDER — SODIUM CHLORIDE, SODIUM LACTATE, POTASSIUM CHLORIDE, CALCIUM CHLORIDE 600; 310; 30; 20 MG/100ML; MG/100ML; MG/100ML; MG/100ML
50 INJECTION, SOLUTION INTRAVENOUS CONTINUOUS
Status: CANCELLED | OUTPATIENT
Start: 2020-07-15

## 2020-07-16 ENCOUNTER — ANESTHESIA (OUTPATIENT)
Dept: GASTROENTEROLOGY | Facility: AMBULARY SURGERY CENTER | Age: 55
End: 2020-07-16

## 2020-07-16 ENCOUNTER — HOSPITAL ENCOUNTER (OUTPATIENT)
Dept: GASTROENTEROLOGY | Facility: AMBULARY SURGERY CENTER | Age: 55
Setting detail: OUTPATIENT SURGERY
Discharge: HOME | End: 2020-07-16
Attending: INTERNAL MEDICINE | Admitting: INTERNAL MEDICINE
Payer: COMMERCIAL

## 2020-07-16 VITALS
HEART RATE: 55 BPM | RESPIRATION RATE: 18 BRPM | HEIGHT: 65 IN | SYSTOLIC BLOOD PRESSURE: 119 MMHG | OXYGEN SATURATION: 99 % | TEMPERATURE: 97.3 F | WEIGHT: 165 LBS | BODY MASS INDEX: 27.49 KG/M2 | DIASTOLIC BLOOD PRESSURE: 75 MMHG

## 2020-07-16 DIAGNOSIS — R10.13 EPIGASTRIC ABDOMINAL PAIN: ICD-10-CM

## 2020-07-16 DIAGNOSIS — Z12.11 COLON CANCER SCREENING: ICD-10-CM

## 2020-07-16 PROCEDURE — 88305 TISSUE EXAM BY PATHOLOGIST: CPT | Performed by: PATHOLOGY

## 2020-07-16 PROCEDURE — NC001 PR NO CHARGE: Performed by: INTERNAL MEDICINE

## 2020-07-16 PROCEDURE — 43239 EGD BIOPSY SINGLE/MULTIPLE: CPT | Performed by: INTERNAL MEDICINE

## 2020-07-16 PROCEDURE — 45385 COLONOSCOPY W/LESION REMOVAL: CPT | Performed by: INTERNAL MEDICINE

## 2020-07-16 RX ORDER — LIDOCAINE HYDROCHLORIDE 10 MG/ML
INJECTION, SOLUTION EPIDURAL; INFILTRATION; INTRACAUDAL; PERINEURAL AS NEEDED
Status: DISCONTINUED | OUTPATIENT
Start: 2020-07-16 | End: 2020-07-16 | Stop reason: SURG

## 2020-07-16 RX ORDER — PROPOFOL 10 MG/ML
INJECTION, EMULSION INTRAVENOUS AS NEEDED
Status: DISCONTINUED | OUTPATIENT
Start: 2020-07-16 | End: 2020-07-16 | Stop reason: SURG

## 2020-07-16 RX ORDER — SODIUM CHLORIDE, SODIUM LACTATE, POTASSIUM CHLORIDE, CALCIUM CHLORIDE 600; 310; 30; 20 MG/100ML; MG/100ML; MG/100ML; MG/100ML
INJECTION, SOLUTION INTRAVENOUS CONTINUOUS PRN
Status: DISCONTINUED | OUTPATIENT
Start: 2020-07-16 | End: 2020-07-16 | Stop reason: SURG

## 2020-07-16 RX ADMIN — LIDOCAINE HYDROCHLORIDE 50 MG: 10 INJECTION, SOLUTION EPIDURAL; INFILTRATION; INTRACAUDAL; PERINEURAL at 08:05

## 2020-07-16 RX ADMIN — PROPOFOL 50 MG: 10 INJECTION, EMULSION INTRAVENOUS at 08:20

## 2020-07-16 RX ADMIN — SODIUM CHLORIDE, SODIUM LACTATE, POTASSIUM CHLORIDE, AND CALCIUM CHLORIDE: .6; .31; .03; .02 INJECTION, SOLUTION INTRAVENOUS at 08:03

## 2020-07-16 RX ADMIN — PROPOFOL 40 MG: 10 INJECTION, EMULSION INTRAVENOUS at 08:08

## 2020-07-16 RX ADMIN — PROPOFOL 30 MG: 10 INJECTION, EMULSION INTRAVENOUS at 08:24

## 2020-07-16 RX ADMIN — PROPOFOL 40 MG: 10 INJECTION, EMULSION INTRAVENOUS at 08:10

## 2020-07-16 RX ADMIN — PROPOFOL 40 MG: 10 INJECTION, EMULSION INTRAVENOUS at 08:06

## 2020-07-16 RX ADMIN — PROPOFOL 50 MG: 10 INJECTION, EMULSION INTRAVENOUS at 08:22

## 2020-07-16 RX ADMIN — PROPOFOL 70 MG: 10 INJECTION, EMULSION INTRAVENOUS at 08:05

## 2020-07-16 RX ADMIN — PROPOFOL 30 MG: 10 INJECTION, EMULSION INTRAVENOUS at 08:28

## 2020-07-16 RX ADMIN — PROPOFOL 30 MG: 10 INJECTION, EMULSION INTRAVENOUS at 08:26

## 2020-07-16 RX ADMIN — PROPOFOL 40 MG: 10 INJECTION, EMULSION INTRAVENOUS at 08:07

## 2020-07-16 RX ADMIN — PROPOFOL 50 MG: 10 INJECTION, EMULSION INTRAVENOUS at 08:18

## 2020-07-16 NOTE — ANESTHESIA PREPROCEDURE EVALUATION
Review of Systems/Medical History  Patient summary reviewed  Chart reviewed  No history of anesthetic complications     Cardiovascular   Pulmonary  Negative pulmonary ROS        GI/Hepatic       Negative  ROS        Endo/Other  History of thyroid disease , hypothyroidism,      GYN  Negative gynecology ROS          Hematology  Negative hematology ROS      Musculoskeletal  Negative musculoskeletal ROS        Neurology  Negative neurology ROS      Psychology   Negative psychology ROS              Physical Exam    Airway    Mallampati score: II  TM Distance: >3 FB  Neck ROM: full     Dental       Cardiovascular      Pulmonary      Other Findings        Anesthesia Plan  ASA Score- 2     Anesthesia Type- IV sedation with anesthesia with ASA Monitors  Additional Monitors:   Airway Plan:         Plan Factors-    Induction- intravenous  Postoperative Plan-     Informed Consent- Anesthetic plan and risks discussed with patient  I personally reviewed this patient with the CRNA  Discussed and agreed on the Anesthesia Plan with the CRNA  Lolis Gamino

## 2020-07-16 NOTE — H&P
History and Physical -  [facility]  Shelia Judd 60 y o  female MRN: [id number]                  HPI: Shelia Judd is a 60y o  year old female who presents for EGD for evaluation of GERD and dyspepsia  Colonoscopy for colon cancer screening  REVIEW OF SYSTEMS: Per the HPI, and otherwise unremarkable      Historical Information   Past Medical History:   Diagnosis Date    Cervicalgia     last assessed: 10/23/2012    Fibroid 01/2015    US reveals 1 8 cm rt intramural, 4 8 pedunculated fibroid rt adnexa    Insomnia      Past Surgical History:   Procedure Laterality Date    LAPAROSCOPIC CHOLECYSTECTOMY       Social History   Social History     Substance and Sexual Activity   Alcohol Use Yes    Comment: social     Social History     Substance and Sexual Activity   Drug Use No     Social History     Tobacco Use   Smoking Status Never Smoker   Smokeless Tobacco Never Used     Family History   Problem Relation Age of Onset    Stroke Mother     Stroke Father     Stroke Brother     Diabetes Family     Hyperlipidemia Family     Hypertension Family     Breast cancer Cousin 48    No Known Problems Maternal Aunt     No Known Problems Maternal Aunt     No Known Problems Maternal Aunt     No Known Problems Maternal Aunt     No Known Problems Paternal Aunt        Meds/Allergies       (Not in a hospital admission)    Allergies   Allergen Reactions    Osphena [Ospemifene] Hives    Food     Nuts     Pollen Extract     Sesame Seed (Diagnostic)     Sulfamethoxazole-Trimethoprim Headache    Wheat Bran        Objective     /75   Pulse 63   Temp 98 1 °F (36 7 °C) (Temporal)   Resp 18   Ht 5' 5" (1 651 m)   Wt 74 8 kg (165 lb)   SpO2 98%   BMI 27 46 kg/m²       PHYSICAL EXAM    Gen: NAD  CV: RRR  CHEST: Clear  ABD: soft, NT/ND  EXT: no edema      ASSESSMENT/PLAN:  This is a 60y o  year old female here for EGD and colonoscopy, and she is stable and optimized for her procedure

## 2020-07-16 NOTE — ANESTHESIA POSTPROCEDURE EVALUATION
Post-Op Assessment Note    CV Status:  Stable  Pain Score: 0    Pain management: adequate     Mental Status:  Alert and awake   Hydration Status:  Euvolemic   PONV Controlled:  Controlled   Airway Patency:  Patent   Post Op Vitals Reviewed: Yes      Staff: CRNA           /61 (07/16/20 0838)    Temp (!) 97 3 °F (36 3 °C) (07/16/20 0838)    Pulse 60 (07/16/20 0838)   Resp 18 (07/16/20 0838)    SpO2 97 % (07/16/20 2589)

## 2020-09-28 ENCOUNTER — ANNUAL EXAM (OUTPATIENT)
Dept: OBGYN CLINIC | Facility: CLINIC | Age: 55
End: 2020-09-28
Payer: COMMERCIAL

## 2020-09-28 VITALS
WEIGHT: 172.4 LBS | BODY MASS INDEX: 28.72 KG/M2 | SYSTOLIC BLOOD PRESSURE: 112 MMHG | HEIGHT: 65 IN | TEMPERATURE: 96.1 F | DIASTOLIC BLOOD PRESSURE: 74 MMHG

## 2020-09-28 DIAGNOSIS — Z12.39 BREAST CANCER SCREENING: ICD-10-CM

## 2020-09-28 DIAGNOSIS — Z01.419 ENCOUNTER FOR ANNUAL ROUTINE GYNECOLOGICAL EXAMINATION: Primary | ICD-10-CM

## 2020-09-28 DIAGNOSIS — E78.1 HYPERTRIGLYCERIDEMIA: ICD-10-CM

## 2020-09-28 PROCEDURE — 99396 PREV VISIT EST AGE 40-64: CPT | Performed by: OBSTETRICS & GYNECOLOGY

## 2020-09-28 PROCEDURE — G0145 SCR C/V CYTO,THINLAYER,RESCR: HCPCS | Performed by: OBSTETRICS & GYNECOLOGY

## 2020-09-28 NOTE — PROGRESS NOTES
Assessment/Plan:  Pap smear done as well as annual   Encouraged self-breast examination as well as calcium supplementation  Continue annual 3D mammogram   Recommend repeating lipid panel, fasting  Reviewed colon cancer screening, colonoscopy done 2020 with follow-up in 5 years  She will continue to follow-up with her family physician as scheduled  Return to office in 1 year or p r n  No problem-specific Assessment & Plan notes found for this encounter  Diagnoses and all orders for this visit:    Encounter for annual routine gynecological examination    Breast cancer screening  -     Mammo screening bilateral w 3d & cad; Future    Hypertriglyceridemia  -     Lipid Panel With Ratios; Future          Subjective:      Patient ID: Horace Braun is a 60 y o  female  HPI     This is a 10-year-old female [de-identified] who presents for annual gyn exam   Patient went through menopause at age 50  She has never been on hormone replacement therapy  She denies any vaginal bleeding or spotting  No changes in bowel or bladder function  She has been in a monogamous relationship with her  for 25 years  She underwent colonoscopy and endoscopy 2020 within normal limits with the exception of diverticulosis with follow-up colonoscopy 5 years  She underwent lipid panel revealing triglyceride level greater than 500, total cholesterol greater than 300  She does state there is a strong family history of heart disease and stroke including mother, father, brother at age 52  Of note these labs do not appear to be fasting  Patient has a very healthy diet and exercises regularly  The following portions of the patient's history were reviewed and updated as appropriate: allergies, current medications, past family history, past medical history, past social history, past surgical history and problem list     Review of Systems   Constitutional: Negative for fatigue, fever and unexpected weight change     Respiratory: Negative for cough, chest tightness, shortness of breath and wheezing  Cardiovascular: Negative  Negative for chest pain and palpitations  Gastrointestinal: Negative  Negative for abdominal distention, abdominal pain, blood in stool, constipation, diarrhea, nausea and vomiting  Genitourinary: Negative  Negative for difficulty urinating, dyspareunia, dysuria, flank pain, frequency, genital sores, hematuria, pelvic pain, urgency, vaginal bleeding, vaginal discharge and vaginal pain  Skin: Negative for rash  Objective:      /74   Temp (!) 96 1 °F (35 6 °C)   Ht 5' 5" (1 651 m)   Wt 78 2 kg (172 lb 6 4 oz)   BMI 28 69 kg/m²          Physical Exam  Constitutional:       Appearance: Normal appearance  She is well-developed  Cardiovascular:      Rate and Rhythm: Normal rate and regular rhythm  Pulmonary:      Effort: Pulmonary effort is normal       Breath sounds: Normal breath sounds  Chest:      Breasts:         Right: No inverted nipple, mass, nipple discharge, skin change or tenderness  Left: No inverted nipple, mass, nipple discharge, skin change or tenderness  Abdominal:      General: Bowel sounds are normal  There is no distension  Palpations: Abdomen is soft  Tenderness: There is no abdominal tenderness  There is no guarding or rebound  Genitourinary:     Labia:         Right: No lesion  Left: No lesion  Vagina: Normal  No vaginal discharge  Cervix: No discharge or friability  Adnexa:         Right: No mass, tenderness or fullness  Left: No mass, tenderness or fullness  Comments: External genitalia is within normal limits  The vagina is evident of estrogen deficiency  Cervix is small nulliparous  There is no pelvic floor prolapse  Rectovaginal exam is confirmatory  Neurological:      Mental Status: She is alert and oriented to person, place, and time

## 2020-09-29 ENCOUNTER — APPOINTMENT (OUTPATIENT)
Dept: LAB | Facility: HOSPITAL | Age: 55
End: 2020-09-29
Attending: OBSTETRICS & GYNECOLOGY
Payer: COMMERCIAL

## 2020-09-29 DIAGNOSIS — E78.1 HYPERTRIGLYCERIDEMIA: ICD-10-CM

## 2020-09-29 LAB
CHOLEST SERPL-MCNC: 374 MG/DL (ref 50–200)
HDLC SERPL-MCNC: 45 MG/DL
LDLC SERPL CALC-MCNC: 266 MG/DL (ref 0–100)
NONHDLC SERPL-MCNC: 329 MG/DL
TRIGL SERPL-MCNC: 314 MG/DL

## 2020-09-29 PROCEDURE — 80061 LIPID PANEL: CPT

## 2020-09-29 PROCEDURE — 36415 COLL VENOUS BLD VENIPUNCTURE: CPT

## 2020-09-30 ENCOUNTER — TELEPHONE (OUTPATIENT)
Dept: OBGYN CLINIC | Facility: CLINIC | Age: 55
End: 2020-09-30

## 2020-09-30 NOTE — TELEPHONE ENCOUNTER
----- Message from Naheed Finley DO sent at 9/30/2020  7:57 AM EDT -----  Inform patient total cholesterol slightly increased, triglycerides decreased, LDL significantly elevated  Recommend evaluation with PCP  Patient was contemplating cardiology consult given strong family history of heart disease

## 2020-09-30 NOTE — TELEPHONE ENCOUNTER
Pt informed of lipid panel results & recom to f/u with pcp or can f/u with cardiologist if pt prefers  She has modified diet - not taking any cholesterol meds

## 2020-10-01 LAB
LAB AP GYN PRIMARY INTERPRETATION: NORMAL
Lab: NORMAL

## 2020-10-13 ENCOUNTER — OFFICE VISIT (OUTPATIENT)
Dept: INTERNAL MEDICINE CLINIC | Facility: CLINIC | Age: 55
End: 2020-10-13
Payer: COMMERCIAL

## 2020-10-13 VITALS
HEART RATE: 76 BPM | DIASTOLIC BLOOD PRESSURE: 72 MMHG | TEMPERATURE: 96.6 F | HEIGHT: 65 IN | OXYGEN SATURATION: 97 % | SYSTOLIC BLOOD PRESSURE: 118 MMHG | BODY MASS INDEX: 28.39 KG/M2 | WEIGHT: 170.4 LBS

## 2020-10-13 DIAGNOSIS — Z23 NEED FOR VACCINATION: ICD-10-CM

## 2020-10-13 DIAGNOSIS — E78.00 HYPERCHOLESTEROLEMIA: Primary | ICD-10-CM

## 2020-10-13 DIAGNOSIS — Z11.59 NEED FOR HEPATITIS C SCREENING TEST: ICD-10-CM

## 2020-10-13 DIAGNOSIS — E03.9 HYPOTHYROIDISM, UNSPECIFIED TYPE: ICD-10-CM

## 2020-10-13 PROCEDURE — 90472 IMMUNIZATION ADMIN EACH ADD: CPT

## 2020-10-13 PROCEDURE — 90715 TDAP VACCINE 7 YRS/> IM: CPT

## 2020-10-13 PROCEDURE — 99204 OFFICE O/P NEW MOD 45 MIN: CPT | Performed by: INTERNAL MEDICINE

## 2020-10-13 PROCEDURE — 93000 ELECTROCARDIOGRAM COMPLETE: CPT | Performed by: INTERNAL MEDICINE

## 2020-10-13 PROCEDURE — 90471 IMMUNIZATION ADMIN: CPT

## 2020-10-13 PROCEDURE — 90682 RIV4 VACC RECOMBINANT DNA IM: CPT

## 2020-10-13 RX ORDER — CETIRIZINE HYDROCHLORIDE 10 MG/1
10 TABLET ORAL DAILY
COMMUNITY

## 2020-10-13 RX ORDER — ATORVASTATIN CALCIUM 40 MG/1
40 TABLET, FILM COATED ORAL DAILY
Qty: 90 TABLET | Refills: 3 | Status: SHIPPED | OUTPATIENT
Start: 2020-10-13 | End: 2021-07-27 | Stop reason: ALTCHOICE

## 2020-10-13 RX ORDER — ASPIRIN 81 MG/1
81 TABLET ORAL DAILY
Start: 2020-10-13

## 2020-10-14 ENCOUNTER — LAB (OUTPATIENT)
Dept: LAB | Facility: HOSPITAL | Age: 55
End: 2020-10-14
Payer: COMMERCIAL

## 2020-10-14 ENCOUNTER — TRANSCRIBE ORDERS (OUTPATIENT)
Dept: LAB | Facility: HOSPITAL | Age: 55
End: 2020-10-14

## 2020-10-14 DIAGNOSIS — E03.9 HYPOTHYROIDISM, UNSPECIFIED TYPE: ICD-10-CM

## 2020-10-14 DIAGNOSIS — E03.9 HYPOTHYROIDISM, UNSPECIFIED TYPE: Primary | ICD-10-CM

## 2020-10-14 LAB
T4 FREE SERPL-MCNC: 0.7 NG/DL (ref 0.76–1.46)
TSH SERPL DL<=0.05 MIU/L-ACNC: 6.27 UIU/ML (ref 0.36–3.74)

## 2020-10-14 PROCEDURE — 36415 COLL VENOUS BLD VENIPUNCTURE: CPT | Performed by: INTERNAL MEDICINE

## 2020-10-14 PROCEDURE — 84443 ASSAY THYROID STIM HORMONE: CPT | Performed by: INTERNAL MEDICINE

## 2020-10-14 PROCEDURE — 84439 ASSAY OF FREE THYROXINE: CPT | Performed by: INTERNAL MEDICINE

## 2020-10-14 RX ORDER — LEVOTHYROXINE SODIUM 0.05 MG/1
50 TABLET ORAL DAILY
Qty: 90 TABLET | Refills: 1 | Status: SHIPPED | OUTPATIENT
Start: 2020-10-14 | End: 2021-01-14 | Stop reason: SDUPTHER

## 2020-10-19 ENCOUNTER — TELEPHONE (OUTPATIENT)
Dept: INTERNAL MEDICINE CLINIC | Facility: CLINIC | Age: 55
End: 2020-10-19

## 2020-11-12 ENCOUNTER — APPOINTMENT (OUTPATIENT)
Dept: LAB | Facility: HOSPITAL | Age: 55
End: 2020-11-12
Payer: COMMERCIAL

## 2020-11-12 DIAGNOSIS — E03.9 HYPOTHYROIDISM, UNSPECIFIED TYPE: ICD-10-CM

## 2020-11-12 DIAGNOSIS — E78.00 HYPERCHOLESTEROLEMIA: Primary | ICD-10-CM

## 2020-11-12 LAB — TSH SERPL DL<=0.05 MIU/L-ACNC: 3.43 UIU/ML (ref 0.36–3.74)

## 2020-11-12 PROCEDURE — 84443 ASSAY THYROID STIM HORMONE: CPT

## 2020-11-12 PROCEDURE — 36415 COLL VENOUS BLD VENIPUNCTURE: CPT

## 2020-12-14 ENCOUNTER — HOSPITAL ENCOUNTER (OUTPATIENT)
Dept: MAMMOGRAPHY | Facility: CLINIC | Age: 55
Discharge: HOME | End: 2020-12-14
Payer: COMMERCIAL

## 2020-12-14 VITALS — WEIGHT: 170 LBS | HEIGHT: 65 IN | BODY MASS INDEX: 28.32 KG/M2

## 2020-12-14 DIAGNOSIS — Z12.31 VISIT FOR SCREENING MAMMOGRAM: ICD-10-CM

## 2020-12-14 DIAGNOSIS — Z12.39 BREAST CANCER SCREENING: ICD-10-CM

## 2020-12-14 PROCEDURE — 77063 BREAST TOMOSYNTHESIS BI: CPT

## 2020-12-14 PROCEDURE — 77067 SCR MAMMO BI INCL CAD: CPT

## 2020-12-23 ENCOUNTER — IMMUNIZATIONS (OUTPATIENT)
Dept: FAMILY MEDICINE CLINIC | Facility: HOSPITAL | Age: 55
End: 2020-12-23

## 2020-12-23 DIAGNOSIS — Z23 ENCOUNTER FOR IMMUNIZATION: ICD-10-CM

## 2020-12-23 PROCEDURE — 91300 SARS-COV-2 / COVID-19 MRNA VACCINE (PFIZER-BIONTECH) 30 MCG: CPT

## 2020-12-23 PROCEDURE — 0001A SARS-COV-2 / COVID-19 MRNA VACCINE (PFIZER-BIONTECH) 30 MCG: CPT

## 2021-01-11 ENCOUNTER — IMMUNIZATIONS (OUTPATIENT)
Dept: FAMILY MEDICINE CLINIC | Facility: HOSPITAL | Age: 56
End: 2021-01-11
Payer: COMMERCIAL

## 2021-01-11 ENCOUNTER — LAB (OUTPATIENT)
Dept: LAB | Facility: HOSPITAL | Age: 56
End: 2021-01-11
Payer: COMMERCIAL

## 2021-01-11 DIAGNOSIS — Z11.59 NEED FOR HEPATITIS C SCREENING TEST: ICD-10-CM

## 2021-01-11 DIAGNOSIS — Z23 ENCOUNTER FOR IMMUNIZATION: ICD-10-CM

## 2021-01-11 DIAGNOSIS — E78.00 HYPERCHOLESTEROLEMIA: ICD-10-CM

## 2021-01-11 LAB
ALBUMIN SERPL BCP-MCNC: 4.5 G/DL (ref 3.5–5)
ALP SERPL-CCNC: 71 U/L (ref 46–116)
ALT SERPL W P-5'-P-CCNC: 74 U/L (ref 12–78)
AST SERPL W P-5'-P-CCNC: 40 U/L (ref 5–45)
BILIRUB DIRECT SERPL-MCNC: 0.12 MG/DL (ref 0–0.2)
BILIRUB SERPL-MCNC: 0.72 MG/DL (ref 0.2–1)
CHOLEST SERPL-MCNC: 246 MG/DL (ref 50–200)
HCV AB SER QL: NORMAL
HDLC SERPL-MCNC: 58 MG/DL
LDLC SERPL CALC-MCNC: 136 MG/DL (ref 0–100)
PROT SERPL-MCNC: 8.2 G/DL (ref 6.4–8.2)
TRIGL SERPL-MCNC: 258 MG/DL
TSH SERPL DL<=0.05 MIU/L-ACNC: 2.59 UIU/ML (ref 0.36–3.74)

## 2021-01-11 PROCEDURE — 80061 LIPID PANEL: CPT

## 2021-01-11 PROCEDURE — 36415 COLL VENOUS BLD VENIPUNCTURE: CPT

## 2021-01-11 PROCEDURE — 86803 HEPATITIS C AB TEST: CPT

## 2021-01-11 PROCEDURE — 84443 ASSAY THYROID STIM HORMONE: CPT

## 2021-01-11 PROCEDURE — 80076 HEPATIC FUNCTION PANEL: CPT

## 2021-01-11 PROCEDURE — 91300 SARS-COV-2 / COVID-19 MRNA VACCINE (PFIZER-BIONTECH) 30 MCG: CPT

## 2021-01-11 PROCEDURE — 0002A SARS-COV-2 / COVID-19 MRNA VACCINE (PFIZER-BIONTECH) 30 MCG: CPT

## 2021-01-14 ENCOUNTER — TELEMEDICINE (OUTPATIENT)
Dept: INTERNAL MEDICINE CLINIC | Facility: CLINIC | Age: 56
End: 2021-01-14
Payer: COMMERCIAL

## 2021-01-14 DIAGNOSIS — Z13.1 DIABETES MELLITUS SCREENING: ICD-10-CM

## 2021-01-14 DIAGNOSIS — E78.00 HYPERCHOLESTEROLEMIA: Primary | ICD-10-CM

## 2021-01-14 DIAGNOSIS — E03.9 HYPOTHYROIDISM, UNSPECIFIED TYPE: ICD-10-CM

## 2021-01-14 PROCEDURE — 99214 OFFICE O/P EST MOD 30 MIN: CPT | Performed by: INTERNAL MEDICINE

## 2021-01-14 RX ORDER — LEVOTHYROXINE SODIUM 0.05 MG/1
50 TABLET ORAL DAILY
Qty: 90 TABLET | Refills: 3 | Status: SHIPPED | OUTPATIENT
Start: 2021-01-14 | End: 2021-12-13 | Stop reason: SDUPTHER

## 2021-01-14 NOTE — PROGRESS NOTES
Virtual Regular Visit      Assessment/Plan:    Problem List Items Addressed This Visit        Other    Hypercholesterolemia - Primary    Relevant Orders    Comprehensive metabolic panel    Lipid Panel with Direct LDL reflex      Other Visit Diagnoses     Hypothyroidism, unspecified type        Relevant Medications    levothyroxine 50 mcg tablet    Other Relevant Orders    CBC and Platelet    Comprehensive metabolic panel    TSH, 3rd generation with Free T4 reflex    Diabetes mellitus screening        Relevant Orders    HEMOGLOBIN A1C W/ EAG ESTIMATION               Reason for visit is   Chief Complaint   Patient presents with    Virtual Regular Visit        Encounter provider Marifer Hogan MD    Provider located at [address] [place] [address]      Recent Visits  No visits were found meeting these conditions  Showing recent visits within past 7 days and meeting all other requirements     Today's Visits  Date Type Provider Dept   01/14/21 Telemedicine Marifer Hogan MD [address] [facility] today's visits and meeting all other requirements     Future Appointments  No visits were found meeting these conditions  Showing future appointments within next 150 days and meeting all other requirements        The patient was identified by name and date of birth  Bao Monsalve was informed that this is a telemedicine visit and that the visit is being conducted through [facility] and patient was informed that this is a secure, HIPAA-compliant platform  She agrees to proceed     My office door was closed  No one else was in the room  She acknowledged consent and understanding of privacy and security of the video platform  The patient has agreed to participate and understands they can discontinue the visit at any time  Patient is aware this is a billable service  Subjective  Bao Monsalve is a 60 y o  female with high cholesterol         Started atorvastatin 40mg in October for LDL >200  She initially had myalgias but this has improved  LDL down to 136  Diet has been poor and she plans to make changes to lower her cholesterol more  Normal LFTs   Normal TSH on levothyroxine 50mcg       Past Medical History:   Diagnosis Date    Cervicalgia     last assessed: 10/23/2012    Fibroid 01/2015    US reveals 1 8 cm rt intramural, 4 8 pedunculated fibroid rt adnexa    Insomnia        Past Surgical History:   Procedure Laterality Date    LAPAROSCOPIC CHOLECYSTECTOMY         Current Outpatient Medications   Medication Sig Dispense Refill    aspirin (ECOTRIN LOW STRENGTH) 81 mg EC tablet Take 1 tablet (81 mg total) by mouth daily      atorvastatin (LIPITOR) 40 mg tablet Take 1 tablet (40 mg total) by mouth daily 90 tablet 3    cetirizine (ZyrTEC) 10 mg tablet Take 10 mg by mouth daily      levothyroxine 50 mcg tablet Take 1 tablet (50 mcg total) by mouth daily 90 tablet 3    Multiple Vitamin (MULTIVITAMINS PO) Take 1 tablet by mouth daily      Omega-3 Fatty Acids (OMEGA-3 FISH OIL) 1200 MG CAPS Take by mouth      VITAMIN D PO Take 1 capsule by mouth daily       No current facility-administered medications for this visit  Allergies   Allergen Reactions    Osphena [Ospemifene] Hives    Food     Nuts     Pollen Extract     Sesame Seed (Diagnostic)     Sulfamethoxazole-Trimethoprim Headache    Wheat Bran        Review of Systems   Constitutional: Positive for unexpected weight change (weight gain)  Negative for chills, fatigue and fever  HENT: Negative for congestion, ear pain, hearing loss, rhinorrhea and sinus pressure  Respiratory: Negative for cough, shortness of breath and wheezing  Cardiovascular: Negative for chest pain  Gastrointestinal: Negative for abdominal pain, constipation, diarrhea, nausea and vomiting  Genitourinary: Negative for difficulty urinating  Musculoskeletal: Negative for myalgias         Video Exam    There were no vitals filed for this visit  Physical Exam  Constitutional:       General: She is not in acute distress  Appearance: She is not ill-appearing, toxic-appearing or diaphoretic  Pulmonary:      Effort: No respiratory distress  Neurological:      Mental Status: She is oriented to person, place, and time  Psychiatric:         Mood and Affect: Mood normal          Behavior: Behavior normal          Thought Content: Thought content normal          Judgment: Judgment normal           I spent 15 minutes directly with the patient during this visit      VIRTUAL VISIT DISCLAIMER    Anthony Lovett acknowledges that she has consented to an online visit or consultation  She understands that the online visit is based solely on information provided by her, and that, in the absence of a face-to-face physical evaluation by the physician, the diagnosis she receives is both limited and provisional in terms of accuracy and completeness  This is not intended to replace a full medical face-to-face evaluation by the physician  Anthony Lovett understands and accepts these terms

## 2021-02-21 ENCOUNTER — OFFICE VISIT (OUTPATIENT)
Dept: URGENT CARE | Age: 56
End: 2021-02-21
Payer: COMMERCIAL

## 2021-02-21 ENCOUNTER — HOSPITAL ENCOUNTER (EMERGENCY)
Facility: HOSPITAL | Age: 56
Discharge: HOME | End: 2021-02-21
Attending: EMERGENCY MEDICINE
Payer: COMMERCIAL

## 2021-02-21 ENCOUNTER — APPOINTMENT (EMERGENCY)
Dept: CT IMAGING | Facility: HOSPITAL | Age: 56
End: 2021-02-21
Payer: COMMERCIAL

## 2021-02-21 VITALS
DIASTOLIC BLOOD PRESSURE: 61 MMHG | HEART RATE: 68 BPM | WEIGHT: 175 LBS | SYSTOLIC BLOOD PRESSURE: 127 MMHG | RESPIRATION RATE: 16 BRPM | OXYGEN SATURATION: 99 % | TEMPERATURE: 97.1 F | HEIGHT: 65 IN | BODY MASS INDEX: 29.16 KG/M2

## 2021-02-21 VITALS
BODY MASS INDEX: 29.38 KG/M2 | HEIGHT: 65 IN | SYSTOLIC BLOOD PRESSURE: 143 MMHG | TEMPERATURE: 97.3 F | HEART RATE: 64 BPM | DIASTOLIC BLOOD PRESSURE: 85 MMHG | WEIGHT: 176.37 LBS | RESPIRATION RATE: 16 BRPM | OXYGEN SATURATION: 98 %

## 2021-02-21 DIAGNOSIS — K57.92 ACUTE DIVERTICULITIS: Primary | ICD-10-CM

## 2021-02-21 DIAGNOSIS — M54.9 ACUTE BACK PAIN, UNSPECIFIED BACK LOCATION, UNSPECIFIED BACK PAIN LATERALITY: ICD-10-CM

## 2021-02-21 DIAGNOSIS — R11.0 NAUSEA: ICD-10-CM

## 2021-02-21 DIAGNOSIS — R35.0 URINARY FREQUENCY: ICD-10-CM

## 2021-02-21 DIAGNOSIS — R10.32 LEFT LOWER QUADRANT PAIN: Primary | ICD-10-CM

## 2021-02-21 DIAGNOSIS — R10.9 LEFT FLANK PAIN: ICD-10-CM

## 2021-02-21 DIAGNOSIS — R10.9 ABDOMINAL PAIN: ICD-10-CM

## 2021-02-21 LAB
ALBUMIN SERPL BCP-MCNC: 4.1 G/DL (ref 3.5–5)
ALP SERPL-CCNC: 77 U/L (ref 46–116)
ALT SERPL W P-5'-P-CCNC: 59 U/L (ref 12–78)
ANION GAP SERPL CALCULATED.3IONS-SCNC: 9 MMOL/L (ref 4–13)
APTT PPP: 33 SECONDS (ref 23–37)
AST SERPL W P-5'-P-CCNC: 28 U/L (ref 5–45)
BASOPHILS # BLD AUTO: 0.04 THOUSANDS/ΜL (ref 0–0.1)
BASOPHILS NFR BLD AUTO: 1 % (ref 0–1)
BILIRUB SERPL-MCNC: 0.41 MG/DL (ref 0.2–1)
BILIRUB UR QL STRIP: NEGATIVE
BUN SERPL-MCNC: 13 MG/DL (ref 5–25)
CALCIUM SERPL-MCNC: 9.4 MG/DL (ref 8.3–10.1)
CHLORIDE SERPL-SCNC: 103 MMOL/L (ref 100–108)
CLARITY UR: CLEAR
CO2 SERPL-SCNC: 28 MMOL/L (ref 21–32)
COLOR UR: YELLOW
CREAT SERPL-MCNC: 0.86 MG/DL (ref 0.6–1.3)
EOSINOPHIL # BLD AUTO: 0.21 THOUSAND/ΜL (ref 0–0.61)
EOSINOPHIL NFR BLD AUTO: 2 % (ref 0–6)
ERYTHROCYTE [DISTWIDTH] IN BLOOD BY AUTOMATED COUNT: 13.7 % (ref 11.6–15.1)
GFR SERPL CREATININE-BSD FRML MDRD: 75 ML/MIN/1.73SQ M
GLUCOSE SERPL-MCNC: 91 MG/DL (ref 65–140)
GLUCOSE UR STRIP-MCNC: NEGATIVE MG/DL
HCT VFR BLD AUTO: 38 % (ref 34.8–46.1)
HGB BLD-MCNC: 12.2 G/DL (ref 11.5–15.4)
HGB UR QL STRIP.AUTO: NEGATIVE
IMM GRANULOCYTES # BLD AUTO: 0.03 THOUSAND/UL (ref 0–0.2)
IMM GRANULOCYTES NFR BLD AUTO: 0 % (ref 0–2)
INR PPP: 0.93 (ref 0.84–1.19)
KETONES UR STRIP-MCNC: NEGATIVE MG/DL
LACTATE SERPL-SCNC: 0.5 MMOL/L (ref 0.5–2)
LEUKOCYTE ESTERASE UR QL STRIP: NEGATIVE
LYMPHOCYTES # BLD AUTO: 1.75 THOUSANDS/ΜL (ref 0.6–4.47)
LYMPHOCYTES NFR BLD AUTO: 20 % (ref 14–44)
MCH RBC QN AUTO: 28.4 PG (ref 26.8–34.3)
MCHC RBC AUTO-ENTMCNC: 32.1 G/DL (ref 31.4–37.4)
MCV RBC AUTO: 89 FL (ref 82–98)
MONOCYTES # BLD AUTO: 0.69 THOUSAND/ΜL (ref 0.17–1.22)
MONOCYTES NFR BLD AUTO: 8 % (ref 4–12)
NEUTROPHILS # BLD AUTO: 6.03 THOUSANDS/ΜL (ref 1.85–7.62)
NEUTS SEG NFR BLD AUTO: 69 % (ref 43–75)
NITRITE UR QL STRIP: NEGATIVE
NRBC BLD AUTO-RTO: 0 /100 WBCS
PH UR STRIP.AUTO: 5.5 [PH] (ref 4.5–8)
PLATELET # BLD AUTO: 324 THOUSANDS/UL (ref 149–390)
PMV BLD AUTO: 10.8 FL (ref 8.9–12.7)
POTASSIUM SERPL-SCNC: 3.5 MMOL/L (ref 3.5–5.3)
PROT SERPL-MCNC: 8.2 G/DL (ref 6.4–8.2)
PROT UR STRIP-MCNC: NEGATIVE MG/DL
PROTHROMBIN TIME: 12.6 SECONDS (ref 11.6–14.5)
RBC # BLD AUTO: 4.29 MILLION/UL (ref 3.81–5.12)
SL AMB  POCT GLUCOSE, UA: NORMAL
SL AMB LEUKOCYTE ESTERASE,UA: NORMAL
SL AMB POCT BILIRUBIN,UA: NORMAL
SL AMB POCT BLOOD,UA: NORMAL
SL AMB POCT CLARITY,UA: CLEAR
SL AMB POCT COLOR,UA: YELLOW
SL AMB POCT KETONES,UA: NORMAL
SL AMB POCT NITRITE,UA: NORMAL
SL AMB POCT PH,UA: 5
SL AMB POCT SPECIFIC GRAVITY,UA: 1.01
SL AMB POCT URINE PROTEIN: NORMAL
SL AMB POCT UROBILINOGEN: 0.2
SODIUM SERPL-SCNC: 140 MMOL/L (ref 136–145)
SP GR UR STRIP.AUTO: 1.01 (ref 1–1.03)
UROBILINOGEN UR QL STRIP.AUTO: 0.2 E.U./DL
WBC # BLD AUTO: 8.75 THOUSAND/UL (ref 4.31–10.16)

## 2021-02-21 PROCEDURE — 85610 PROTHROMBIN TIME: CPT | Performed by: PHYSICIAN ASSISTANT

## 2021-02-21 PROCEDURE — 93005 ELECTROCARDIOGRAM TRACING: CPT | Performed by: PHYSICIAN ASSISTANT

## 2021-02-21 PROCEDURE — 81002 URINALYSIS NONAUTO W/O SCOPE: CPT | Performed by: PHYSICIAN ASSISTANT

## 2021-02-21 PROCEDURE — G0382 LEV 3 HOSP TYPE B ED VISIT: HCPCS | Performed by: PHYSICIAN ASSISTANT

## 2021-02-21 PROCEDURE — 80053 COMPREHEN METABOLIC PANEL: CPT | Performed by: PHYSICIAN ASSISTANT

## 2021-02-21 PROCEDURE — 96360 HYDRATION IV INFUSION INIT: CPT

## 2021-02-21 PROCEDURE — 74177 CT ABD & PELVIS W/CONTRAST: CPT

## 2021-02-21 PROCEDURE — 99285 EMERGENCY DEPT VISIT HI MDM: CPT | Performed by: PHYSICIAN ASSISTANT

## 2021-02-21 PROCEDURE — 85025 COMPLETE CBC W/AUTO DIFF WBC: CPT | Performed by: PHYSICIAN ASSISTANT

## 2021-02-21 PROCEDURE — 36415 COLL VENOUS BLD VENIPUNCTURE: CPT | Performed by: PHYSICIAN ASSISTANT

## 2021-02-21 PROCEDURE — 83605 ASSAY OF LACTIC ACID: CPT | Performed by: PHYSICIAN ASSISTANT

## 2021-02-21 PROCEDURE — 81003 URINALYSIS AUTO W/O SCOPE: CPT

## 2021-02-21 PROCEDURE — 99284 EMERGENCY DEPT VISIT MOD MDM: CPT

## 2021-02-21 PROCEDURE — 85730 THROMBOPLASTIN TIME PARTIAL: CPT | Performed by: PHYSICIAN ASSISTANT

## 2021-02-21 RX ORDER — CIPROFLOXACIN 500 MG/1
500 TABLET, FILM COATED ORAL EVERY 12 HOURS
Qty: 20 TABLET | Refills: 0 | Status: SHIPPED | OUTPATIENT
Start: 2021-02-21 | End: 2022-01-03 | Stop reason: SDUPTHER

## 2021-02-21 RX ORDER — METRONIDAZOLE 500 MG/1
500 TABLET ORAL ONCE
Status: COMPLETED | OUTPATIENT
Start: 2021-02-21 | End: 2021-02-21

## 2021-02-21 RX ORDER — CIPROFLOXACIN 500 MG/1
500 TABLET, FILM COATED ORAL ONCE
Status: COMPLETED | OUTPATIENT
Start: 2021-02-21 | End: 2021-02-21

## 2021-02-21 RX ORDER — METRONIDAZOLE 500 MG/1
500 TABLET ORAL EVERY 8 HOURS SCHEDULED
Qty: 30 TABLET | Refills: 0 | Status: SHIPPED | OUTPATIENT
Start: 2021-02-21 | End: 2022-01-03 | Stop reason: SDUPTHER

## 2021-02-21 RX ADMIN — CIPROFLOXACIN HYDROCHLORIDE 500 MG: 500 TABLET, FILM COATED ORAL at 16:47

## 2021-02-21 RX ADMIN — SODIUM CHLORIDE 1000 ML: 0.9 INJECTION, SOLUTION INTRAVENOUS at 14:14

## 2021-02-21 RX ADMIN — METRONIDAZOLE 500 MG: 500 TABLET ORAL at 16:47

## 2021-02-21 RX ADMIN — IOHEXOL 100 ML: 350 INJECTION, SOLUTION INTRAVENOUS at 15:34

## 2021-02-21 NOTE — ED PROVIDER NOTES
History  Chief Complaint   Patient presents with    Abdominal Pain     Pt presents to ED from home w/ abd pain, UTI type s/s so pt went to urgent care, urine did not indicate infection  Pt sent to ED for eval   Pt feeling weak, "not herself", (+) nausea  Pt denies pmh  Patient presents emergency room with a 2 day history of left lower quadrant abdominal pain  States that the pain has since resolved  She does states that she has not been feeling well  She complains of some generalized myalgias  She denies any fever chills  She denies any nasal congestion, postnasal drip, sore throat, coughing  She denies any dysuria has noticed frequency  She has no urinary urgency  She has had a laparoscopic cholecystectomy  She was seen at [facility] where she was evaluated  Her urinalysis was normal so she was sent to the emergency room for evaluation  Upon presentation to the ER she states that she does not have any left lower quadrant tenderness any longer  She states that resolved  She has had a decreased appetite and associated nausea  She has not been exposed to anybody with COVID  She has a history of constipation and has been taking stool softeners  She is on a new medication Lipitor questions whether her nausea and myalgias are related to the new medication  Past medical history is positive for Cervacalgia, fibroids, insomnia  Differential diagnosis includes but is not limited to colitis, diverticulitis, pancreatitis, hernia, ovarian cysts, urinary tract infection, kidney stone, pyelonephritis, side effect to the Lipitor, viral syndrome, COVID        History provided by:  Patient  Abdominal Pain  Pain location:  LLQ  Pain quality: cramping    Pain radiates to:  Does not radiate  Pain severity:  Mild  Duration:  2 days  Progression:  Resolved  Chronicity:  New  Context: not alcohol use, not awakening from sleep, not diet changes, not eating, not laxative use, not medication withdrawal, not previous surgeries, not recent illness, not recent sexual activity, not recent travel, not retching, not sick contacts, not suspicious food intake and not trauma    Relieved by:  Nothing  Worsened by:  Nothing  Ineffective treatments:  None tried  Associated symptoms: anorexia, constipation and nausea    Associated symptoms: no belching, no chest pain, no chills, no cough, no diarrhea, no dysuria, no fatigue, no fever, no flatus, no hematemesis, no hematochezia, no hematuria, no melena, no shortness of breath, no sore throat and no vomiting    Risk factors: no alcohol abuse, no aspirin use, not elderly, has not had multiple surgeries, no NSAID use, not obese, not pregnant and no recent hospitalization        Prior to Admission Medications   Prescriptions Last Dose Informant Patient Reported? Taking?    Multiple Vitamin (MULTIVITAMINS PO)  Self Yes No   Sig: Take 1 tablet by mouth daily   Omega-3 Fatty Acids (OMEGA-3 FISH OIL) 1200 MG CAPS  Self Yes No   Sig: Take by mouth   VITAMIN D PO   Yes No   Sig: Take 1 capsule by mouth daily   aspirin (ECOTRIN LOW STRENGTH) 81 mg EC tablet   No No   Sig: Take 1 tablet (81 mg total) by mouth daily   atorvastatin (LIPITOR) 40 mg tablet   No No   Sig: Take 1 tablet (40 mg total) by mouth daily   cetirizine (ZyrTEC) 10 mg tablet   Yes No   Sig: Take 10 mg by mouth daily   levothyroxine 50 mcg tablet   No No   Sig: Take 1 tablet (50 mcg total) by mouth daily      Facility-Administered Medications: None       Past Medical History:   Diagnosis Date    Cervicalgia     last assessed: 10/23/2012    Fibroid 01/2015    US reveals 1 8 cm rt intramural, 4 8 pedunculated fibroid rt adnexa    Insomnia        Past Surgical History:   Procedure Laterality Date    LAPAROSCOPIC CHOLECYSTECTOMY         Family History   Problem Relation Age of Onset    Stroke Mother     Stroke Father     Stroke Brother     Diabetes Family     Hyperlipidemia Family     Hypertension Family     Breast cancer Cousin 48    No Known Problems Maternal Aunt     No Known Problems Maternal Aunt     No Known Problems Maternal Aunt     No Known Problems Maternal Aunt     No Known Problems Paternal Aunt     Dementia Sister      I have reviewed and agree with the history as documented  E-Cigarette/Vaping    E-Cigarette Use Never User      E-Cigarette/Vaping Substances     Social History     Tobacco Use    Smoking status: Never Smoker    Smokeless tobacco: Never Used   Substance Use Topics    Alcohol use: Yes     Comment: social    Drug use: No       Review of Systems   Constitutional: Positive for appetite change  Negative for activity change, chills, diaphoresis, fatigue and fever  HENT: Negative for congestion, mouth sores, postnasal drip, rhinorrhea and sore throat  Eyes: Negative for pain, discharge, redness and itching  Respiratory: Negative for cough, chest tightness and shortness of breath  Cardiovascular: Negative for chest pain  Gastrointestinal: Positive for abdominal pain, anorexia, constipation and nausea  Negative for diarrhea, flatus, hematemesis, hematochezia, melena and vomiting  Genitourinary: Positive for frequency  Negative for dysuria, hematuria and urgency  Musculoskeletal: Positive for myalgias  Negative for back pain and gait problem  Skin: Negative for color change, pallor and rash  Psychiatric/Behavioral: Negative for confusion  All other systems reviewed and are negative  Physical Exam  Physical Exam  Vitals signs and nursing note reviewed  Constitutional:       General: She is not in acute distress  Appearance: She is well-developed and normal weight  She is not ill-appearing, toxic-appearing or diaphoretic  HENT:      Head: Normocephalic and atraumatic  Cardiovascular:      Rate and Rhythm: Normal rate and regular rhythm  Heart sounds: No murmur  Friction rub present  No gallop      Pulmonary:      Effort: Pulmonary effort is normal  Breath sounds: Normal breath sounds  Abdominal:      General: Abdomen is flat  There is no distension  Palpations: Abdomen is soft  There is no hepatomegaly or splenomegaly  Tenderness: There is abdominal tenderness in the left lower quadrant  There is no guarding or rebound  Skin:     General: Skin is warm  Capillary Refill: Capillary refill takes less than 2 seconds  Neurological:      General: No focal deficit present  Mental Status: She is alert and oriented to person, place, and time     Psychiatric:         Mood and Affect: Mood normal          Behavior: Behavior normal          Vital Signs  ED Triage Vitals   Temperature Pulse Respirations Blood Pressure SpO2   02/21/21 1238 02/21/21 1238 02/21/21 1238 02/21/21 1238 02/21/21 1238   (!) 97 3 °F (36 3 °C) 76 16 132/74 98 %      Temp Source Heart Rate Source Patient Position - Orthostatic VS BP Location FiO2 (%)   02/21/21 1238 02/21/21 1647 02/21/21 1647 02/21/21 1647 --   Temporal Monitor Sitting Right arm       Pain Score       02/21/21 1647       No Pain           Vitals:    02/21/21 1238 02/21/21 1647   BP: 132/74 143/85   Pulse: 76 64   Patient Position - Orthostatic VS:  Sitting         Visual Acuity      ED Medications  Medications   sodium chloride 0 9 % bolus 1,000 mL (0 mL Intravenous Stopped 2/21/21 1514)   iohexol (OMNIPAQUE) 350 MG/ML injection (SINGLE-DOSE) 100 mL (100 mL Intravenous Given 2/21/21 1534)   ciprofloxacin (CIPRO) tablet 500 mg (500 mg Oral Given 2/21/21 1647)   metroNIDAZOLE (FLAGYL) tablet 500 mg (500 mg Oral Given 2/21/21 1647)       Diagnostic Studies  Results Reviewed     Procedure Component Value Units Date/Time    Comprehensive metabolic panel [[id number]] Collected: 02/21/21 1359    Lab Status: Final result Specimen: Blood from Arm, Right Updated: 02/21/21 1434     Sodium 140 mmol/L      Potassium 3 5 mmol/L      Chloride 103 mmol/L      CO2 28 mmol/L      ANION GAP 9 mmol/L      BUN 13 mg/dL Creatinine 0 86 mg/dL      Glucose 91 mg/dL      Calcium 9 4 mg/dL      AST 28 U/L      ALT 59 U/L      Alkaline Phosphatase 77 U/L      Total Protein 8 2 g/dL      Albumin 4 1 g/dL      Total Bilirubin 0 41 mg/dL      eGFR 75 ml/min/1 73sq m     Narrative:      [facility] guidelines for Chronic Kidney Disease (CKD):     Stage 1 with normal or high GFR (GFR > 90 mL/min/1 73 square meters)    Stage 2 Mild CKD (GFR = 60-89 mL/min/1 73 square meters)    Stage 3A Moderate CKD (GFR = 45-59 mL/min/1 73 square meters)    Stage 3B Moderate CKD (GFR = 30-44 mL/min/1 73 square meters)    Stage 4 Severe CKD (GFR = 15-29 mL/min/1 73 square meters)    Stage 5 End Stage CKD (GFR <15 mL/min/1 73 square meters)  Note: GFR calculation is accurate only with a steady state creatinine    Lactic acid [[id number]]  (Normal) Collected: 02/21/21 1359    Lab Status: Final result Specimen: Blood from Arm, Right Updated: 02/21/21 1428     LACTIC ACID 0 5 mmol/L     Narrative:      Result may be elevated if tourniquet was used during collection      Protime-INR [[id number]]  (Normal) Collected: 02/21/21 1359    Lab Status: Final result Specimen: Blood from Arm, Right Updated: 02/21/21 1421     Protime 12 6 seconds      INR 0 93    APTT [[id number]]  (Normal) Collected: 02/21/21 1359    Lab Status: Final result Specimen: Blood from Arm, Right Updated: 02/21/21 1421     PTT 33 seconds     Urine Macroscopic, POC [[id number]] Collected: 02/21/21 1409    Lab Status: Final result Specimen: Urine Updated: 02/21/21 1411     Color, UA Yellow     Clarity, UA Clear     pH, UA 5 5     Leukocytes, UA Negative     Nitrite, UA Negative     Protein, UA Negative mg/dl      Glucose, UA Negative mg/dl      Ketones, UA Negative mg/dl      Urobilinogen, UA 0 2 E U /dl      Bilirubin, UA Negative     Blood, UA Negative     Specific Gravity, UA 1 015    Narrative:      CLINITEK RESULT    CBC and differential [[id number]] Collected: 02/21/21 1359    Lab Status: Final result Specimen: Blood from Arm, Right Updated: 02/21/21 1410     WBC 8 75 Thousand/uL      RBC 4 29 Million/uL      Hemoglobin 12 2 g/dL      Hematocrit 38 0 %      MCV 89 fL      MCH 28 4 pg      MCHC 32 1 g/dL      RDW 13 7 %      MPV 10 8 fL      Platelets 024 Thousands/uL      nRBC 0 /100 WBCs      Neutrophils Relative 69 %      Immat GRANS % 0 %      Lymphocytes Relative 20 %      Monocytes Relative 8 %      Eosinophils Relative 2 %      Basophils Relative 1 %      Neutrophils Absolute 6 03 Thousands/µL      Immature Grans Absolute 0 03 Thousand/uL      Lymphocytes Absolute 1 75 Thousands/µL      Monocytes Absolute 0 69 Thousand/µL      Eosinophils Absolute 0 21 Thousand/µL      Basophils Absolute 0 04 Thousands/µL                  CT abdomen pelvis with contrast   ED Interpretation by Cristiano Esparza PA-C (02/21 1628)   Sigmoid diverticulosis with wall thickening and anna sigmoid inflammation as seen on image 2/64 consistent with acute diverticulitis  No perforation or abscess  Stable uterine fibroid  Final Result by Karla Lay MD (02/21 1623)      Sigmoid diverticulosis with wall thickening and perisigmoid inflammation as seen on image 2/64 consistent with acute diverticulitis  No perforation or abscess  Stable uterine fibroid        Workstation performed: [id number]                    Procedures  ECG 12 Lead Documentation Only    Date/Time: 2/21/2021 12:07 PM  Performed by: Cristiano Esparza PA-C  Authorized by: Cristiano Esparza PA-C     Indications / Diagnosis:  Abd pain  ECG reviewed by me, the ED Provider: yes    Patient location:  ED  Previous ECG:     Previous ECG:  Compared to current    Comparison ECG info:  2/10/14    Similarity:  Changes noted    Comparison to cardiac monitor: Yes    Interpretation:     Interpretation: non-specific    Rate:     ECG rate:  57    ECG rate assessment: bradycardic    Rhythm:     Rhythm: sinus bradycardia    Ectopy: Ectopy: none    QRS:     QRS axis:  Normal    QRS intervals:  Normal  Conduction:     Conduction: normal    ST segments:     ST segments:  Normal  T waves:     T waves: normal    Comments:      No signs of ischemia  No evidence of heart block  Independently interpreted by me and Dr Christopher Mcclelland             ED Course                                           MDM  Number of Diagnoses or Management Options  Abdominal pain: new and requires workup  Acute diverticulitis: new and requires workup     Amount and/or Complexity of Data Reviewed  Clinical lab tests: ordered and reviewed  Tests in the radiology section of CPT®: ordered and reviewed  Tests in the medicine section of CPT®: ordered and reviewed    Risk of Complications, Morbidity, and/or Mortality  Presenting problems: high  Diagnostic procedures: high  Management options: high  General comments: Patient presented to the emergency room with complaints of abdominal pain and nausea  She was seen and evaluated  Laboratory studies were taken and were reviewed and were within normal limits  The patient was sent for CT scan of the abdomen and pelvis which demonstrated diverticulosis and evidence of diverticulitis without evidence of perforation or abscess  Patient was medicated with Cipro and Flagyl prior to discharge  She was placed on a clear liquid diet for the next 48-72 hours until she has a recheck with her family physician  She will continue with the Cipro and Flagyl at home  She is aware of the tendon injury that is associated with Cipro  She states she has taken it before and has never had a problem  She was given reasons to return to the emergency room should her symptoms worsen      Patient Progress  Patient progress: stable      Disposition  Final diagnoses:   Acute diverticulitis   Abdominal pain     Time reflects when diagnosis was documented in both MDM as applicable and the Disposition within this note     Time User Action Codes Description Comment    2/21/2021  4:30 PM Nateshelli Gomezet Add [K57 92] Acute diverticulitis     2/21/2021  4:30 PM Nate Gomezet Add [R10 9] Abdominal pain       ED Disposition     ED Disposition Condition Date/Time Comment    Discharge Stable Sun Feb 21, 2021  4:30 PM Paresh Powell discharge to home/self care  Follow-up Information     Follow up With Specialties Details Why Carlos Hodges MD Internal Medicine Schedule an appointment as soon as possible for a visit in 3 days For recheck [address]  [phone]            Patient's Medications   Discharge Prescriptions    CIPROFLOXACIN (CIPRO) 500 MG TABLET    Take 1 tablet (500 mg total) by mouth every 12 (twelve) hours for 10 days       Start Date: 2/21/2021 End Date: 3/3/2021       Order Dose: 500 mg       Quantity: 20 tablet    Refills: 0    METRONIDAZOLE (FLAGYL) 500 MG TABLET    Take 1 tablet (500 mg total) by mouth every 8 (eight) hours for 10 days       Start Date: 2/21/2021 End Date: 3/3/2021       Order Dose: 500 mg       Quantity: 30 tablet    Refills: 0     No discharge procedures on file      PDMP Review     None          ED Provider  Electronically Signed by           Elissa Sanabria PA-C  02/21/21 8234

## 2021-02-21 NOTE — DISCHARGE INSTRUCTIONS
Clear liquids for the next 48 hours  Please have a recheck with your physician to advance her diet to regular    If you have increasing abdominal pain, fever greater than 100 4° F, shaking chills or night sweats, return to the emergency room for repeat exam

## 2021-02-21 NOTE — PROGRESS NOTES
[facility]        NAME: Benita Armstrong is a 60 y o  female  : 1965    MRN: [id number]  DATE: 2021  TIME: 11:50 AM    Assessment and Plan   Left lower quadrant pain [R10 32]  1  Left lower quadrant pain  POCT urine dip    Transfer to other facility   2  Urinary frequency  Transfer to other facility   3  Nausea  Transfer to other facility   4  Acute back pain, unspecified back location, unspecified back pain laterality  Transfer to other facility   5  Left flank pain       Urine dip- yellow and clear, negative nitrites, negative blood, negative leukocytes    Bilateral back pain radiating to lower abdomen, left lower quadrant pain, feeling like cant get urine out, some urinary frequency but no dysuria  States does not feel like prior UTIs  Nausea without vomiting, feeling hot/cold  Has been taking ibuprofen without relief    Patient Instructions     Patient would like to go via private vehicle to [facility] Emergency Department  Please go to the [facility] Emergency Department now for further evaluation and treatment- hospital address verified with the patient  Patient agreed to go immediately to the ED  Chief Complaint     Chief Complaint   Patient presents with    Urinary Frequency     Patient reports she started with back and suprapubic pain on Wednesday  Also reports urinary frequency and feeling like her bladder is not emptying completely  History of Present Illness       59 y/o female presents with back pain radiating to abdomen, left side worse than right, x 5 days  States she did try some colace because she had a harder stool without improvement of the pain  States her BMs have been back to normal  Denies any diarrhea or blood in stool  States she started having urinary frequency but feels she "can't empty her bladder"   Denies any dysuria or blood in urine, states when she gets UTIs she normally has blood in her urine- states this does not feel like her previous UTIs  She states she is having some nausea without vomiting  States she has been feeling hot/cold  Has been taking ibuprofen since yesterday with mild improvement  She denies any pelvic/vaginal pain or bleeding  She denies any recent travel, new foods or restaurant, sick contacts/known COVID exposures  Review of Systems   Review of Systems   Constitutional: Positive for chills and fever (feeling hot/cold)  Negative for activity change, appetite change and fatigue  HENT: Negative  Respiratory: Negative for shortness of breath  Cardiovascular: Negative for chest pain  Gastrointestinal: Positive for abdominal pain and nausea  Negative for blood in stool, diarrhea and vomiting  Genitourinary: Positive for frequency and urgency  Negative for decreased urine volume, dysuria, flank pain, genital sores, hematuria, pelvic pain, vaginal bleeding, vaginal discharge and vaginal pain  Feels unable to empty bladder   Musculoskeletal: Positive for back pain and myalgias  Neurological: Negative for dizziness, weakness and light-headedness  All other systems reviewed and are negative          Current Medications       Current Outpatient Medications:     aspirin (ECOTRIN LOW STRENGTH) 81 mg EC tablet, Take 1 tablet (81 mg total) by mouth daily, Disp:  , Rfl:     atorvastatin (LIPITOR) 40 mg tablet, Take 1 tablet (40 mg total) by mouth daily, Disp: 90 tablet, Rfl: 3    cetirizine (ZyrTEC) 10 mg tablet, Take 10 mg by mouth daily, Disp: , Rfl:     levothyroxine 50 mcg tablet, Take 1 tablet (50 mcg total) by mouth daily, Disp: 90 tablet, Rfl: 3    Multiple Vitamin (MULTIVITAMINS PO), Take 1 tablet by mouth daily, Disp: , Rfl:     Omega-3 Fatty Acids (OMEGA-3 FISH OIL) 1200 MG CAPS, Take by mouth, Disp: , Rfl:     VITAMIN D PO, Take 1 capsule by mouth daily, Disp: , Rfl:     Current Allergies     Allergies as of 02/21/2021 - Reviewed 02/21/2021   Allergen Reaction Noted    Osphena [ospemifene] Hives 03/01/2018    Food  03/01/2017    Nuts  03/01/2017    Pollen extract  02/13/2013    Sesame seed (diagnostic)  03/01/2017    Sulfamethoxazole-trimethoprim Headache 10/16/2012    Wheat bran  03/01/2017            The following portions of the patient's history were reviewed and updated as appropriate: allergies, current medications, past family history, past medical history, past social history, past surgical history and problem list      Past Medical History:   Diagnosis Date    Cervicalgia     last assessed: 10/23/2012    Fibroid 01/2015    US reveals 1 8 cm rt intramural, 4 8 pedunculated fibroid rt adnexa    Insomnia        Past Surgical History:   Procedure Laterality Date    LAPAROSCOPIC CHOLECYSTECTOMY         Family History   Problem Relation Age of Onset    Stroke Mother     Stroke Father     Stroke Brother     Diabetes Family     Hyperlipidemia Family     Hypertension Family     Breast cancer Cousin 48    No Known Problems Maternal Aunt     No Known Problems Maternal Aunt     No Known Problems Maternal Aunt     No Known Problems Maternal Aunt     No Known Problems Paternal Aunt     Dementia Sister          Medications have been verified  Objective   /61   Pulse 68   Temp (!) 97 1 °F (36 2 °C)   Resp 16   Ht 5' 5" (1 651 m)   Wt 79 4 kg (175 lb)   SpO2 99%   BMI 29 12 kg/m²        Physical Exam     Physical Exam  Vitals signs and nursing note reviewed  Constitutional:       General: She is not in acute distress  Appearance: She is well-developed  HENT:      Head: Normocephalic and atraumatic  Cardiovascular:      Rate and Rhythm: Normal rate and regular rhythm  Heart sounds: Normal heart sounds  Pulmonary:      Effort: Pulmonary effort is normal       Breath sounds: Normal breath sounds  No wheezing  Abdominal:      General: Bowel sounds are normal       Palpations: Abdomen is soft        Tenderness: There is abdominal tenderness (patient states "achiness" to LLQ and left flank)  There is no right CVA tenderness, left CVA tenderness, guarding or rebound  Comments: No CVA tenderness   Genitourinary:     Comments: Unable to do in this setting  Musculoskeletal:      Thoracic back: She exhibits no bony tenderness  Lumbar back: She exhibits no bony tenderness  Skin:     Capillary Refill: Capillary refill takes less than 2 seconds  Neurological:      Mental Status: She is alert and oriented to person, place, and time     Psychiatric:         Behavior: Behavior normal

## 2021-02-21 NOTE — PATIENT INSTRUCTIONS
Patient would like to go via private vehicle to [facility] Emergency Department  Please go to the [facility] Emergency Department now for further evaluation and treatment- hospital address verified with the patient  Patient agreed to go immediately to the ED

## 2021-02-24 LAB
ATRIAL RATE: 57 BPM
P AXIS: 55 DEGREES
PR INTERVAL: 122 MS
QRS AXIS: 52 DEGREES
QRSD INTERVAL: 82 MS
QT INTERVAL: 426 MS
QTC INTERVAL: 414 MS
T WAVE AXIS: 38 DEGREES
VENTRICULAR RATE: 57 BPM

## 2021-02-24 PROCEDURE — 93010 ELECTROCARDIOGRAM REPORT: CPT | Performed by: INTERNAL MEDICINE

## 2021-02-25 ENCOUNTER — OFFICE VISIT (OUTPATIENT)
Dept: INTERNAL MEDICINE CLINIC | Facility: CLINIC | Age: 56
End: 2021-02-25
Payer: COMMERCIAL

## 2021-02-25 VITALS
DIASTOLIC BLOOD PRESSURE: 72 MMHG | BODY MASS INDEX: 29.16 KG/M2 | WEIGHT: 175 LBS | OXYGEN SATURATION: 96 % | HEIGHT: 65 IN | TEMPERATURE: 97.6 F | SYSTOLIC BLOOD PRESSURE: 116 MMHG | HEART RATE: 69 BPM

## 2021-02-25 DIAGNOSIS — K57.92 ACUTE DIVERTICULITIS: Primary | ICD-10-CM

## 2021-02-25 PROCEDURE — 99214 OFFICE O/P EST MOD 30 MIN: CPT | Performed by: INTERNAL MEDICINE

## 2021-02-25 RX ORDER — ONDANSETRON 4 MG/1
4 TABLET, ORALLY DISINTEGRATING ORAL EVERY 6 HOURS PRN
Qty: 20 TABLET | Refills: 0 | Status: SHIPPED | OUTPATIENT
Start: 2021-02-25 | End: 2021-07-27 | Stop reason: ALTCHOICE

## 2021-02-25 NOTE — LETTER
February 25, 2021     Patient: Shanae Griffin   YOB: 1965   Date of Visit: 2/25/2021       To Whom it May Concern:    Shanae Griffin is under my professional care  She was seen in my office on 2/25/2021  She may return to work on 3/4/2021  Please excuse from work from 2/25-3/3/2021 Dx acute diverticulitis    If you have any questions or concerns, please don't hesitate to call           Sincerely,          Jose Carlos Chen MD        CC: No Recipients

## 2021-02-25 NOTE — PROGRESS NOTES
Assessment/Plan:  First episode of acute sigmoid diverticulitis  Continue with current antibiotics ciprofloxacin and metronidazole  Zofran sent for her  Advance diet as tolerated     Problem List Items Addressed This Visit     None      Visit Diagnoses     Acute diverticulitis    -  Primary    Relevant Medications    ondansetron (ZOFRAN-ODT) 4 mg disintegrating tablet            Subjective:      Patient ID: Risa Honeycutt is a 60 y o  female  HPI  Lower back pain nausea and felt ill a week ago  She went to urgent care and by then had left sided abdominal pain  US normal so she was sent to the ER  CT showed sigmoid diverticulitis and a small fibroid  Placed on metronidazole and ciprofloxacin  Back pain resolved and left sided abdominal pain improved  She continues to feel nauseous  Denies fever chills vomiting  Bowel movements are watery  She advanced her diet today from clears the past 3 days  She had a colonoscopy a year ago    The following portions of the patient's history were reviewed and updated as appropriate: allergies, current medications, past family history, past medical history, past social history, past surgical history and problem list     Review of Systems   Constitutional: Positive for fatigue  Negative for chills and fever  HENT: Negative for congestion, rhinorrhea and sore throat  Respiratory: Negative for cough and shortness of breath  Cardiovascular: Negative for palpitations  Gastrointestinal: Positive for abdominal pain (mild LLQ), diarrhea and nausea  Negative for blood in stool  Genitourinary: Negative for difficulty urinating, dysuria, hematuria, urgency and vaginal bleeding  Musculoskeletal: Positive for back pain  Objective:      /72   Pulse 69   Temp 97 6 °F (36 4 °C)   Ht 5' 5" (1 651 m)   Wt 79 4 kg (175 lb)   SpO2 96%   BMI 29 12 kg/m²          Physical Exam  Constitutional:       General: She is not in acute distress       Appearance: She is well-developed  She is not ill-appearing, toxic-appearing or diaphoretic  HENT:      Head: Normocephalic and atraumatic  Eyes:      Conjunctiva/sclera: Conjunctivae normal    Neck:      Musculoskeletal: Neck supple  Cardiovascular:      Rate and Rhythm: Normal rate and regular rhythm  Heart sounds: Normal heart sounds  No murmur  Pulmonary:      Effort: Pulmonary effort is normal  No respiratory distress  Breath sounds: Normal breath sounds  No wheezing or rales  Abdominal:      General: Bowel sounds are increased  There is no distension  Palpations: Abdomen is soft  There is no mass  Tenderness: There is no abdominal tenderness  There is no guarding or rebound  Skin:     General: Skin is warm and dry  Neurological:      Mental Status: She is alert and oriented to person, place, and time  Psychiatric:         Mood and Affect: Mood normal          Behavior: Behavior normal          Thought Content:  Thought content normal          Judgment: Judgment normal

## 2021-04-25 ENCOUNTER — HOSPITAL ENCOUNTER (EMERGENCY)
Facility: HOSPITAL | Age: 56
Discharge: HOME | End: 2021-04-25
Attending: EMERGENCY MEDICINE
Payer: COMMERCIAL

## 2021-04-25 ENCOUNTER — APPOINTMENT (EMERGENCY)
Dept: RADIOLOGY | Facility: HOSPITAL | Age: 56
End: 2021-04-25
Payer: COMMERCIAL

## 2021-04-25 VITALS
RESPIRATION RATE: 18 BRPM | DIASTOLIC BLOOD PRESSURE: 67 MMHG | SYSTOLIC BLOOD PRESSURE: 115 MMHG | TEMPERATURE: 98.2 F | OXYGEN SATURATION: 96 % | HEART RATE: 74 BPM

## 2021-04-25 DIAGNOSIS — S89.91XA INJURY OF RIGHT KNEE, INITIAL ENCOUNTER: Primary | ICD-10-CM

## 2021-04-25 PROCEDURE — 73564 X-RAY EXAM KNEE 4 OR MORE: CPT

## 2021-04-25 PROCEDURE — 99283 EMERGENCY DEPT VISIT LOW MDM: CPT

## 2021-04-25 PROCEDURE — 99284 EMERGENCY DEPT VISIT MOD MDM: CPT | Performed by: EMERGENCY MEDICINE

## 2021-04-25 NOTE — ED PROVIDER NOTES
Emergency Department Trauma Note  Gui James 60 y o  female MRN: [id number]  Unit/Bed#: ED 11/ED 11 Encounter: [id number]      Trauma Alert: Trauma Acuity: C  Model of Arrival: Mode of Arrival: Direct from scene via    Trauma Team: Current Providers  Attending Provider: Matteo Khan DO  Registered Nurse: Sal Sutton RN  Consultants: None      History of Present Illness     Chief Complaint:   Chief Complaint   Patient presents with    Knee Injury     right knee injury yesterday after falling off step stool  HPI:  Gui James is a 60 y o  female who presents with right knee pain after a fall last night  She was hanging wind chimes, standing on a step ladder and fell  She states she briefly hit her face on the ground but glasses did not break  No bruising or laceration on the face or head  Mechanism:Details of Incident: fell off step ladder yesterday; right knee pain; hit head; on 81mg ASA Injury Date: 04/25/21 Injury Time: 1900      HPI  Review of Systems   Constitutional: Negative for chills, fatigue and fever  HENT: Negative for postnasal drip, sore throat and trouble swallowing  Respiratory: Negative for chest tightness and shortness of breath  Gastrointestinal: Negative for abdominal pain  Genitourinary: Negative for dysuria  Musculoskeletal: Positive for gait problem (due to knee pain)  Skin: Negative for rash  Allergic/Immunologic: Negative for immunocompromised state  Neurological: Negative for dizziness, light-headedness and headaches         Historical Information     Immunizations:   Immunization History   Administered Date(s) Administered    INFLUENZA 09/19/2016, 10/08/2018    Influenza Quadrivalent, 6-35 Months IM 09/19/2016    Influenza, recombinant, quadrivalent,injectable, preservative free 10/13/2020    SARS-CoV-2 / COVID-19 mRNA IM ([employer]) 12/23/2020, 01/11/2021    Tdap 10/13/2020       Past Medical History:   Diagnosis Date    Cervicalgia     last assessed: 10/23/2012    Fibroid 01/2015    US reveals 1 8 cm rt intramural, 4 8 pedunculated fibroid rt adnexa    Insomnia        Family History   Problem Relation Age of Onset    Stroke Mother     Stroke Father     Stroke Brother     Diabetes Family     Hyperlipidemia Family     Hypertension Family     Breast cancer Cousin 48    No Known Problems Maternal Aunt     No Known Problems Maternal Aunt     No Known Problems Maternal Aunt     No Known Problems Maternal Aunt     No Known Problems Paternal Aunt     Dementia Sister      Past Surgical History:   Procedure Laterality Date    LAPAROSCOPIC CHOLECYSTECTOMY       Social History     Tobacco Use    Smoking status: Never Smoker    Smokeless tobacco: Never Used   Substance Use Topics    Alcohol use: Yes     Comment: social    Drug use: No     E-Cigarette/Vaping    E-Cigarette Use Never User      E-Cigarette/Vaping Substances    Nicotine No     THC No     CBD No     Flavoring No     Other No     Unknown No        Family History: non-contributory    Meds/Allergies   Prior to Admission Medications   Prescriptions Last Dose Informant Patient Reported? Taking?    Multiple Vitamin (MULTIVITAMINS PO)  Self Yes No   Sig: Take 1 tablet by mouth daily   Omega-3 Fatty Acids (OMEGA-3 FISH OIL) 1200 MG CAPS  Self Yes No   Sig: Take by mouth   VITAMIN D PO   Yes No   Sig: Take 1 capsule by mouth daily   aspirin (ECOTRIN LOW STRENGTH) 81 mg EC tablet   No No   Sig: Take 1 tablet (81 mg total) by mouth daily   atorvastatin (LIPITOR) 40 mg tablet   No No   Sig: Take 1 tablet (40 mg total) by mouth daily   cetirizine (ZyrTEC) 10 mg tablet   Yes No   Sig: Take 10 mg by mouth daily   levothyroxine 50 mcg tablet   No No   Sig: Take 1 tablet (50 mcg total) by mouth daily   ondansetron (ZOFRAN-ODT) 4 mg disintegrating tablet   No No   Sig: Take 1 tablet (4 mg total) by mouth every 6 (six) hours as needed for nausea or vomiting      Facility-Administered Medications: None       Allergies   Allergen Reactions    Osphena [Ospemifene] Hives    Food     Nuts - Food Allergy     Pollen Extract     Sesame Seed (Diagnostic) - Food Allergy     Sulfamethoxazole-Trimethoprim Headache    Wheat Bran - Food Allergy        PHYSICAL EXAM    PE limited by: not limited    Objective   Vitals:   First set: Temperature: 98 2 °F (36 8 °C) (04/25/21 1011)  Pulse: 84 (04/25/21 1011)  Respirations: 18 (04/25/21 1011)  Blood Pressure: 134/75 (04/25/21 1011)  SpO2: 95 % (04/25/21 1011)    Primary Survey:   (A) Airway: intact  (B) Breathing: breath sounds equal bilaterally  (C) Circulation: Pulses:   normal  (D) Disabliity:  GCS Total:  15  (E) Expose:  Completed    Secondary Survey: (Click on Physical Exam tab above)  Physical Exam  Vitals signs and nursing note reviewed  Constitutional:       Appearance: She is well-developed  HENT:      Head: Normocephalic and atraumatic  Mouth/Throat:      Mouth: Mucous membranes are moist       Pharynx: Uvula midline  Tonsils: No tonsillar exudate  Eyes:      Pupils: Pupils are equal, round, and reactive to light  Neck:      Musculoskeletal: Normal range of motion and neck supple  Cardiovascular:      Rate and Rhythm: Normal rate and regular rhythm  Pulmonary:      Effort: Pulmonary effort is normal       Breath sounds: Normal breath sounds  Abdominal:      General: Bowel sounds are normal       Palpations: Abdomen is soft  Tenderness: There is no abdominal tenderness  There is no guarding or rebound  Musculoskeletal:         General: No deformity  Right knee: She exhibits decreased range of motion and swelling (mild)  She exhibits no ecchymosis, no deformity, no laceration and no erythema  Tenderness found  Medial joint line and lateral joint line tenderness noted  No patellar tendon tenderness noted  Comments: Pain with varus and valgus stress on the right knee    Unable to truly assess ligamentous laxity due to pain with ROM  NV intact distally  Skin:     General: Skin is warm and dry  Capillary Refill: Capillary refill takes less than 2 seconds  Neurological:      General: No focal deficit present  Mental Status: She is alert and oriented to person, place, and time  Comments: Patient moving all extremities equally, no focal neuro deficits noted  Psychiatric:         Mood and Affect: Mood normal          Behavior: Behavior normal          Cervical spine cleared by clinical criteria? Yes     Invasive Devices     None                 Lab Results:   Results Reviewed     None                 Imaging Studies:   Direct to CT: No  XR knee 4+ views Right injury   ED Interpretation by Philip Jara DO (04/25 7985)   No acute process  Final Result by Kaley Cade MD (04/25 2147)      No acute osseous abnormality  Mild tricompartmental osteoarthritis  Workstation performed: [id number]               Procedures  Procedures         ED Course           MDM  Number of Diagnoses or Management Options  Injury of right knee, initial encounter: new and requires workup  Diagnosis management comments: X-ray negative by my read  Likely meniscal injury  Will place in a knee immobilizer and given crutches and have follow-up with ortho as an outpatient  Patient notes a minor head injury greater than 12 hours ago  She has a normal neurologic exam and no outward signs of head trauma  No indication for CT scan of the head at this time         Amount and/or Complexity of Data Reviewed  Tests in the radiology section of CPT®: ordered and reviewed  Independent visualization of images, tracings, or specimens: yes    Risk of Complications, Morbidity, and/or Mortality  Presenting problems: low  Diagnostic procedures: low  Management options: low    Patient Progress  Patient progress: stable          Disposition  [facility] Transfer: No  Final diagnoses:   Injury of right knee, initial encounter     Time reflects when diagnosis was documented in both MDM as applicable and the Disposition within this note     Time User Action Codes Description Comment    4/25/2021 10:35 AM Ansley Loya Add [S89 [id number]] Injury of right knee, initial encounter       ED Disposition     ED Disposition Condition Date/Time Comment    Discharge Stable Sun Apr 25, 2021 10:35 AM Shalonda Paul discharge to home/self care  Follow-up Information     Follow up With Specialties Details Why Contact Info Additional [id number] include 234 beds at the [facility] Orthopedic Surgery Schedule an appointment as soon as possible for a visit   [address] [facility] [address], [place], [place], [address] [facility]        Discharge Medication List as of 4/25/2021 10:36 AM      CONTINUE these medications which have NOT CHANGED    Details   aspirin (ECOTRIN LOW STRENGTH) 81 mg EC tablet Take 1 tablet (81 mg total) by mouth daily, Starting Tue 10/13/2020, No Print      atorvastatin (LIPITOR) 40 mg tablet Take 1 tablet (40 mg total) by mouth daily, Starting Tue 10/13/2020, Normal      cetirizine (ZyrTEC) 10 mg tablet Take 10 mg by mouth daily, Historical Med      levothyroxine 50 mcg tablet Take 1 tablet (50 mcg total) by mouth daily, Starting Thu 1/14/2021, Until Tue 7/13/2021, Normal      Multiple Vitamin (MULTIVITAMINS PO) Take 1 tablet by mouth daily, Starting Tue 1/10/2012, Historical Med      Omega-3 Fatty Acids (OMEGA-3 FISH OIL) 1200 MG CAPS Take by mouth, Starting Wed 3/1/2017, Historical Med      ondansetron (ZOFRAN-ODT) 4 mg disintegrating tablet Take 1 tablet (4 mg total) by mouth every 6 (six) hours as needed for nausea or vomiting, Starting Thu 2/25/2021, Normal      VITAMIN D PO Take 1 capsule by mouth daily, Historical Med           No discharge procedures on file      PDMP Review     None          ED Provider  Electronically Signed by         Kiersten Burk,   04/25/21 1400 Anthony Loya,   04/29/21 1101

## 2021-04-25 NOTE — Clinical Note
Stanley Brenden was seen and treated in our emergency department on 4/25/2021  No work until cleared by Family Doctor/Orthopedics        Diagnosis:     Charlie Deluca  may return to work on return date  She may return on this date: 04/29/2021         If you have any questions or concerns, please don't hesitate to call        Luciana Valiente, DO    ______________________________           _______________          _______________  Hospital Representative                              Date                                Time

## 2021-04-27 ENCOUNTER — OFFICE VISIT (OUTPATIENT)
Dept: OBGYN CLINIC | Facility: HOSPITAL | Age: 56
End: 2021-04-27
Payer: COMMERCIAL

## 2021-04-27 VITALS
HEART RATE: 62 BPM | SYSTOLIC BLOOD PRESSURE: 134 MMHG | DIASTOLIC BLOOD PRESSURE: 85 MMHG | HEIGHT: 65 IN | BODY MASS INDEX: 28.99 KG/M2 | WEIGHT: 174 LBS

## 2021-04-27 DIAGNOSIS — M23.91 INTERNAL DERANGEMENT OF RIGHT KNEE: Primary | ICD-10-CM

## 2021-04-27 PROCEDURE — 20610 DRAIN/INJ JOINT/BURSA W/O US: CPT | Performed by: ORTHOPAEDIC SURGERY

## 2021-04-27 PROCEDURE — 99203 OFFICE O/P NEW LOW 30 MIN: CPT | Performed by: ORTHOPAEDIC SURGERY

## 2021-04-27 RX ORDER — BUPIVACAINE HYDROCHLORIDE 2.5 MG/ML
2 INJECTION, SOLUTION INFILTRATION; PERINEURAL
Status: COMPLETED | OUTPATIENT
Start: 2021-04-27 | End: 2021-04-27

## 2021-04-27 RX ORDER — METHYLPREDNISOLONE ACETATE 40 MG/ML
2 INJECTION, SUSPENSION INTRA-ARTICULAR; INTRALESIONAL; INTRAMUSCULAR; SOFT TISSUE
Status: COMPLETED | OUTPATIENT
Start: 2021-04-27 | End: 2021-04-27

## 2021-04-27 RX ADMIN — METHYLPREDNISOLONE ACETATE 2 ML: 40 INJECTION, SUSPENSION INTRA-ARTICULAR; INTRALESIONAL; INTRAMUSCULAR; SOFT TISSUE at 09:15

## 2021-04-27 RX ADMIN — BUPIVACAINE HYDROCHLORIDE 2 ML: 2.5 INJECTION, SOLUTION INFILTRATION; PERINEURAL at 09:15

## 2021-04-27 NOTE — PROGRESS NOTES
60 y o female with right knee pain s/p fall on Sunday who presents for evaluation  She was seen at [facility] ED after her fall while trying to up wind chimes where she had nonweight bearing x-rays taken and she was placed in a knee immobilizer and told to follow up with orthopedics  She states that she has had feeling of weakness and pain in the right knee for years, but that her fall exacerbated those symptoms and that she does experience feelings of instability of the knee and catching  She has had no prior evaluation or treatment for her right knee including no steroid injection and physical therapy  The pain is worse with movement and better with rest  She has no history of diabetes or blood thinners aside from aspirin  She has no numbness or tingling of the right lower extremity      Review of Systems  Review of systems negative unless otherwise specified in HPI    Past Medical History  Past Medical History:   Diagnosis Date    Cervicalgia     last assessed: 10/23/2012    Fibroid 01/2015    US reveals 1 8 cm rt intramural, 4 8 pedunculated fibroid rt adnexa    Insomnia        Past Surgical History  Past Surgical History:   Procedure Laterality Date    LAPAROSCOPIC CHOLECYSTECTOMY         Current Medications  Current Outpatient Medications on File Prior to Visit   Medication Sig Dispense Refill    aspirin (ECOTRIN LOW STRENGTH) 81 mg EC tablet Take 1 tablet (81 mg total) by mouth daily      atorvastatin (LIPITOR) 40 mg tablet Take 1 tablet (40 mg total) by mouth daily 90 tablet 3    cetirizine (ZyrTEC) 10 mg tablet Take 10 mg by mouth daily      levothyroxine 50 mcg tablet Take 1 tablet (50 mcg total) by mouth daily 90 tablet 3    Multiple Vitamin (MULTIVITAMINS PO) Take 1 tablet by mouth daily      Omega-3 Fatty Acids (OMEGA-3 FISH OIL) 1200 MG CAPS Take by mouth      ondansetron (ZOFRAN-ODT) 4 mg disintegrating tablet Take 1 tablet (4 mg total) by mouth every 6 (six) hours as needed for nausea or vomiting 20 tablet 0    VITAMIN D PO Take 1 capsule by mouth daily       No current facility-administered medications on file prior to visit  Recent Labs [facility])  0   Lab Value Date/Time    HCT 38 0 02/21/2021 1359    HCT 38 0 02/10/2014 0950    HGB 12 2 02/21/2021 1359    HGB 12 6 02/10/2014 0950    WBC 8 75 02/21/2021 1359    WBC 6 30 02/10/2014 0950    INR 0 93 02/21/2021 1359    GLUCOSE 102 02/10/2014 0950    HGBA1C 5 7 (H) 03/12/2020 1604    HGBA1C 6 0 (H) 07/23/2015 1004         Physical exam  · General: Awake, Alert, Oriented  · Eyes: Pupils equal, round and reactive to light  · Heart: regular rate and rhythm  · Lungs: No audible wheezing  · Abdomen: soft  RLE  - skin intact, no effusion  - NTTP medial and lateral joint lines  - Knee ROM 0-95 flexion/extension  Pain with knee flexion 90 deg  - No laxity of varus/valgus stress, anterior/posterior drawer  - Motor and sensation intact L3-S1  - Extremity warm and adequately perfused    Imaging   radiographs were reviewed with the patient  Non weight bearing views of the right knee were reviewed and discussed with the patient with my read as follows: mild degenerative changes of the knee without evidence of fracture or dislocation    Procedure  Large joint arthrocentesis: R knee  Universal Protocol:  Consent: Verbal consent obtained  Risks and benefits: risks, benefits and alternatives were discussed  Consent given by: patient  Patient understanding: patient states understanding of the procedure being performed  Radiology Images displayed and confirmed   If images not available, report reviewed: imaging studies available  Patient identity confirmed: verbally with patient    Supporting Documentation  Indications: pain   Procedure Details  Location: knee - R knee  Needle size: 22 G  Approach: anterolateral  Medications administered: 2 mL bupivacaine 0 25 %; 2 mL methylPREDNISolone acetate 40 mg/mL    Patient tolerance: patient tolerated the procedure well with no immediate complications  Dressing:  Sterile dressing applied          Assessment/Plan:   60 y  o female with right knee pain  Given history and physical exam the patient likely has internal derangement of the knee and would benefit from steroid injection and physical therapy  Discussion was had with the patient regarding treatment plan, risks, benefits,and alternatives and the patient agreed  Right knee CSI provided today along with knee exercises, script for physical therapy and a hinged knee brace  Patient was provided a work note with plan for re-evaluation in 4 weeks      WBAT RLE  PT/home exercises  Steroid injection  Knee brace fitted today  RTC in 4 weeks for re-evaluation

## 2021-04-28 ENCOUNTER — EVALUATION (OUTPATIENT)
Dept: PHYSICAL THERAPY | Facility: CLINIC | Age: 56
End: 2021-04-28
Payer: COMMERCIAL

## 2021-04-28 DIAGNOSIS — M23.91 INTERNAL DERANGEMENT OF RIGHT KNEE: ICD-10-CM

## 2021-04-28 PROCEDURE — 97110 THERAPEUTIC EXERCISES: CPT | Performed by: PHYSICAL THERAPIST

## 2021-04-28 PROCEDURE — 97162 PT EVAL MOD COMPLEX 30 MIN: CPT | Performed by: PHYSICAL THERAPIST

## 2021-04-28 NOTE — PROGRESS NOTES
PT Evaluation     Today's date: 2021  Patient name: Lonny Shin  : 1965  MRN: [id number]  Referring provider: Catherine Monaco MD  Dx:   Encounter Diagnosis     ICD-10-CM    1  Internal derangement of right knee  M23 91 Ambulatory referral to Physical Therapy                  Assessment  Assessment details: Lonny Shin is a 60 y o  female with Internal derangement of right knee  She presents with possible mild laxity of the ACL vs PCL,  pain, decreased strength, decreased ROM, effusion and  ambulatory dysfunction  Due to these impairments, Patient has difficulty performing a/iadls, recreational activities and engaging in social activities  Patient's clinical presentation is consistent with their referring diagnosis  Patient would benefit from skilled physical therapy to address their aforementioned impairments, improve their level of function and to improve their overall quality of life   has been given a home exercise program and is in agreement with the plan of care  Thank you for your referral   Impairments: abnormal gait, activity intolerance, impaired physical strength and lacks appropriate home exercise program  Understanding of Dx/Px/POC: excellent  Goals  ST Goals - 2-4 weeks  1  Patient will report decreased pain with activity by at least 2 points within 4 weeks  2  Patient will improve ROM 5-10 degrees within 4 weeks  3  Patient will demonstrate ability to actively correct posture without cueing within 4 weeks  4  Patient will perform IADLs without pain in 2 weeks  5  Patient will increase strength by 25% in 4 weeks    LT Goals - Discharge  1  Patient will improve FOTO score to maximum stated or greater by discharge  2  Patient will return to preferred recreational activity without significant pain increase by discharge   3    Patient will return to all work related activities without pain by discharge     Plan  Patient would benefit from: skilled physical therapy  Planned modality interventions: cryotherapy  Planned therapy interventions: therapeutic exercise, therapeutic activities, home exercise program, manual therapy, neuromuscular re-education, strengthening, stretching and balance  Frequency: 2x week  Duration in visits: 20  Duration in weeks: 20  Plan of Care beginning date: 2021  Plan of Care expiration date: 2021  Treatment plan discussed with: patient        Subjective Evaluation    History of Present Illness  Mechanism of injury: Patient reports that she had an approx 1-2 month history of R knee pain and then she fell off a step stool on Sat  She was using crutches/walker and went to the ER on   She received a cortisone injection yesterday  She presents in a brace and no AD  CC: Patient reports that she has pain in the posterior aspect of the knee joint  She reports having had some swelling after the fall  Function:  Patient reports that she has been able to perform more IADLs today  She has been able to ambulate without AD and brace  She has been going up and in the stairs and potting plants today bc she felt less pain  Patient works as an aide in [place]  Patient did not work today            Recurrent probem    Quality of life: excellent    Pain  Current pain rating: 3  At best pain ratin  At worst pain ratin  Location: before the cortisone injection  Quality: sharp  Relieving factors: ice and medications  Aggravating factors: standing, walking and stair climbing  Progression: improved    Social Support  Steps to enter house: yes  Stairs in house: yes   Lives in: multiple-level home  Lives with: spouse    Employment status: working    Diagnostic Tests  X-ray: abnormal    FCE comments: Tricompartmental OA with osteophytesPatient Goals  Patient goals for therapy: decreased edema, decreased pain, improved balance, increased motion, return to work, return to [employer] activities, independence with ADLs/IADLs and increased strength          Objective     Static Posture     Knee   Knee (Right): Flexed  Observations     Right Knee   Positive for effusion  Tenderness     Right Knee   Tenderness in the popliteal fossa  Active Range of Motion   Left Knee   Flexion contracture  Flexion: 140 degrees   Extension: 0 degrees     Right Knee   Flexion: 115 degrees   Extension: 5 degrees   Extensor lag: 10 degrees     Mobility   Patellar Mobility:     Right Knee   Hypomobile: medial, lateral, superior and inferior     Patellar Static Positioning   Right Knee: lateral tilt    Strength/Myotome Testing     Left Knee   Normal strength  Quadriceps contraction: good    Right Knee   Flexion: 4+  Extension: 4+  Quadriceps contraction: fair    Tests     Right Knee   Positive posterior sag  Negative anterior drawer, anterior Lachman, lateral Ana, medial Ana, posterior drawer, posterior Lachman, valgus stress test at 0 degrees, valgus stress test at 30 degrees, varus stress test at 0 degrees and varus stress test at 30 degrees       Swelling     Left Knee Girth Measurement (cm)   Joint line: 38 cm  10 cm above joint line: 41 5 cm  10 cm below joint line: 34 cm    Right Knee Girth Measurement (cm)   Joint line: 40 cm  10 cm above joint line: 42 cm  10 cm below joint line: 35 cm             Precautions:na        Manuals 4/28            Prom flexion                                                    Neuro Re-Ed                                                                                                        Ther Ex             QS 10 x :10            HS 10 x :10            ADDcsets 5 x :10            Hamstring st                                                                 Ther Activity                                       Gait Training                                       Modalities

## 2021-05-03 ENCOUNTER — OFFICE VISIT (OUTPATIENT)
Dept: PHYSICAL THERAPY | Facility: CLINIC | Age: 56
End: 2021-05-03
Payer: COMMERCIAL

## 2021-05-03 DIAGNOSIS — M23.91 INTERNAL DERANGEMENT OF RIGHT KNEE: Primary | ICD-10-CM

## 2021-05-03 PROCEDURE — 97112 NEUROMUSCULAR REEDUCATION: CPT

## 2021-05-03 PROCEDURE — 97110 THERAPEUTIC EXERCISES: CPT

## 2021-05-03 NOTE — PROGRESS NOTES
Daily Note     Today's date: 5/3/2021  Patient name: Roger Elder  : 1965  MRN: [id number]  Referring provider: Anson Osgood, MD  Dx: No diagnosis found  Subjective: Patient states that she is feels a significant improvement with pain/swelling and tolerance to walking since IE  Spoke to patient able increase walking on a daily basis and will check response at nv  Objective: See treatment diary below      Assessment: Tolerated treatment well  General fatigue with hip strengthening exercises  Patient Update HEP at NV      Plan: Continue per plan of care        Precautions:na        Manuals  5/3           Prom flexion  Full ROM                                                  Neuro Re-Ed                                                                                                        Ther Ex             QS 10 x :10 HEP            HS 10 x :10 :10x 10            ADDcsets 5 x :10            Hamstring st  seated :20 x 5           Gastroc wedge stretch  :20 x 5 B           SLR x 3   1# 2 x 10            Clamshells   GTB 2 x 10            Side stepping  GTB 4 laps           Bike  7 min           Sit to stand   NV           Leg press  NV                        Ther Activity                                       Gait Training                                       Modalities

## 2021-05-06 ENCOUNTER — APPOINTMENT (OUTPATIENT)
Dept: PHYSICAL THERAPY | Facility: CLINIC | Age: 56
End: 2021-05-06
Payer: COMMERCIAL

## 2021-05-10 ENCOUNTER — TELEPHONE (OUTPATIENT)
Dept: OBGYN CLINIC | Facility: HOSPITAL | Age: 56
End: 2021-05-10

## 2021-05-10 ENCOUNTER — OFFICE VISIT (OUTPATIENT)
Dept: PHYSICAL THERAPY | Facility: CLINIC | Age: 56
End: 2021-05-10
Payer: COMMERCIAL

## 2021-05-10 DIAGNOSIS — M23.91 INTERNAL DERANGEMENT OF RIGHT KNEE: Primary | ICD-10-CM

## 2021-05-10 PROCEDURE — 97530 THERAPEUTIC ACTIVITIES: CPT | Performed by: PHYSICAL THERAPIST

## 2021-05-10 PROCEDURE — 97110 THERAPEUTIC EXERCISES: CPT | Performed by: PHYSICAL THERAPIST

## 2021-05-10 NOTE — TELEPHONE ENCOUNTER
Patient is calling after PT and her physical therapist does not think she should return to work just yet  Patient is asking for the RTW note request to be put on hold for now  Patient will be in touch once PT confirms that she is ready

## 2021-05-10 NOTE — PROGRESS NOTES
Daily Note     Today's date: 5/10/2021  Patient name: Bianca Melvin  : 1965  MRN: [id number]  Referring provider: Amanda Macias MD  Dx:   Encounter Diagnosis     ICD-10-CM    1  Internal derangement of right knee  M23 91                   Subjective: Patient reports that she is eager to RTW  She feels like she would be able to perform her job tasks without difficulty  Objective: See treatment diary below      Assessment: Tolerated treatment well  Patient would benefit from continued PT  Patient challenged by squatting and lifting with rotation therefore, it was determined that patient not quite ready for RTW  Plan: Potential discharge next visit       Precautions:na        Manuals 4/28 5/3 5/10          Prom flexion  Full ROM                                                  Neuro Re-Ed                                                                                                        Ther Ex             QS 10 x :10 HEP            HS 10 x :10 :10x 10            LAQ 5 x :10 2x10 2# 2 x 10          Hamstring st  seated :20 x 5 dc          Gastroc wedge stretch  :20 x 5 B dc          SLR x 3   1# 2 x 10  2# 2 x 10          Clamshells   GTB 2 x 10  BTB 2 x 10          Side stepping  GTB 4 laps GTB 5 laps          Bike  7 min 7'          Squat  NV x10          Leg press B  NV 95# x 10          LP SL   80# x 10          Ther Activity             Cone taps standing on R   x10          Box lift xfer   x5          Gait Training                                       Modalities

## 2021-05-10 NOTE — TELEPHONE ENCOUNTER
Patient sees Dr Merrill Trinidad  She is calling stating that she would like a letter written to return back to work  She is looking to return with no restrictions   Call back #[phone]    Please fax to #[phone]

## 2021-05-13 ENCOUNTER — OFFICE VISIT (OUTPATIENT)
Dept: PHYSICAL THERAPY | Facility: CLINIC | Age: 56
End: 2021-05-13
Payer: COMMERCIAL

## 2021-05-13 DIAGNOSIS — M23.91 INTERNAL DERANGEMENT OF RIGHT KNEE: Primary | ICD-10-CM

## 2021-05-13 PROCEDURE — 97112 NEUROMUSCULAR REEDUCATION: CPT

## 2021-05-13 PROCEDURE — 97110 THERAPEUTIC EXERCISES: CPT

## 2021-05-13 NOTE — PROGRESS NOTES
Daily Note     Today's date: 2021  Patient name: Kartik Uribe  : 1965  MRN: [id number]  Referring provider: Boy Van MD  Dx:   Encounter Diagnosis     ICD-10-CM    1  Internal derangement of right knee  M23 91                   Subjective: Patient denies pain following last treatment session  Objective: See treatment diary below      Assessment: Tolerated treatment well  Patient exhibited good technique with therapeutic exercises      Plan: Continue per plan of care        Precautions:na        Manuals 4/28 5/3 5/10 5/13         Prom flexion  Full ROM                                                  Neuro Re-Ed                                                                                                        Ther Ex                                       LAQ 5 x :10 2x10 2# 2 x 10 2 5# 2 x 10                                    SLR x 3   1# 2 x 10  2# 2 x 10 2 5#2 x 10          Clamshells   GTB 2 x 10  BTB 2 x 10 BTB 2 x 10         Side stepping  GTB 4 laps GTB 5 laps GTB 5 laps          Bike  7 min 7' 7         Squat  NV x10          Leg press B  NV 95# x 10 100#  2 10          LP SL   80# x 10 80# 2 x10          Ther Activity             Cone taps standing on R   x10 X 10          Box lift xfer   x5  x 10 floor to low mat         Gait Training                                       Modalities

## 2021-05-17 ENCOUNTER — OFFICE VISIT (OUTPATIENT)
Dept: PHYSICAL THERAPY | Facility: CLINIC | Age: 56
End: 2021-05-17
Payer: COMMERCIAL

## 2021-05-17 ENCOUNTER — TELEPHONE (OUTPATIENT)
Dept: OBGYN CLINIC | Facility: HOSPITAL | Age: 56
End: 2021-05-17

## 2021-05-17 DIAGNOSIS — M23.91 INTERNAL DERANGEMENT OF RIGHT KNEE: Primary | ICD-10-CM

## 2021-05-17 PROCEDURE — 97110 THERAPEUTIC EXERCISES: CPT

## 2021-05-17 PROCEDURE — 97112 NEUROMUSCULAR REEDUCATION: CPT

## 2021-05-17 NOTE — PROGRESS NOTES
Daily Note     Today's date: 2021  Patient name: Don Garibay  : 1965  MRN: [id number]  Referring provider: Link Harmon MD  Dx:   Encounter Diagnosis     ICD-10-CM    1  Internal derangement of right knee  M23 91                   Subjective: Patient states that she was able to walk recreationally this weekend without significant pain  Patient is hopeful to RTW this week  Objective: See treatment diary below      Assessment: Tolerated treatment well  Patient would benefit from continued PT      Plan: Continue per plan of care        Precautions:na        Manuals  5/3 5/10 5/13 5/17        Prom flexion  Full ROM   Full                                                Neuro Re-Ed                                                                                                        Ther Ex                                       LAQ 5 x :10 2x10 2# 2 x 10 2 5# 2 x 10  2 5# 2 x 10                     Standing hip abduction/extension     GTB 2 x 10         SLR x 3   1# 2 x 10  2# 2 x 10 2 5#2 x 10  2 5# 2 x 10        Clamshells   GTB 2 x 10  BTB 2 x 10 BTB 2 x 10 BTB 2 x 10        Side stepping  GTB 4 laps GTB 5 laps GTB 5 laps  GTB 5 laps        Bike  7 min 7' 7 7         Squat  NV x10          Leg press B  NV 95# x 10 100#  2 10  105# 3 x 10         LP SL   80# x 10 80# 2 x10  80# 3 x 10         Ther Activity             Cone taps standing on R   x10 X 10   x 10        Box lift xfer   x5  x 10 floor to low mat X 10         Gait Training                                       Modalities

## 2021-05-17 NOTE — TELEPHONE ENCOUNTER
Patient sees Dr Danielle Fay  Patient called requesting a work letter to return back to work  Please send via e-mail:   [email or web address] and if can also be send via fax to # [phone]  Thank you

## 2021-05-20 ENCOUNTER — APPOINTMENT (OUTPATIENT)
Dept: PHYSICAL THERAPY | Facility: CLINIC | Age: 56
End: 2021-05-20
Payer: COMMERCIAL

## 2021-05-25 ENCOUNTER — OFFICE VISIT (OUTPATIENT)
Dept: OBGYN CLINIC | Facility: HOSPITAL | Age: 56
End: 2021-05-25
Payer: COMMERCIAL

## 2021-05-25 VITALS
DIASTOLIC BLOOD PRESSURE: 61 MMHG | SYSTOLIC BLOOD PRESSURE: 130 MMHG | WEIGHT: 176.8 LBS | HEIGHT: 65 IN | BODY MASS INDEX: 29.46 KG/M2 | HEART RATE: 65 BPM

## 2021-05-25 DIAGNOSIS — M23.91 INTERNAL DERANGEMENT OF RIGHT KNEE: Primary | ICD-10-CM

## 2021-05-25 PROCEDURE — 99213 OFFICE O/P EST LOW 20 MIN: CPT | Performed by: ORTHOPAEDIC SURGERY

## 2021-05-25 NOTE — PROGRESS NOTES
Assessment:   Diagnosis ICD-10-CM Associated Orders   1  Internal derangement of right knee  M23 91        Plan:  *  On exam patient has nonpainful full range of motion of the right knee  She has no tenderness or strength deficit  * Overall the patient is happy with her result is back to doing all of her activities  She is back at work using the neoprene brace as needed for support  To do next visit:  Return if symptoms worsen or fail to improve  The above stated was discussed in layman's terms and the patient expressed understanding  All questions were answered to the patient's satisfaction  Scribe Attestation    I,:  Meagan Boateng am acting as a scribe while in the presence of the attending physician :       I,:  Maggy Garza MD personally performed the services described in this documentation    as scribed in my presence :             Subjective:   Min Crowder is a 60 y o  female who presents  Today for 4 week follow-up for her right knee pain  At her last visit on 04/27/2021, a cortisone injection that gave her good significant relief  She notes that physical therapy definitely gave her the tools she needed to get better  She notes that she has been lax on her exercises but will get back to them  She wears a neoprene brace for support while at work  She took 3 weeks off and has been back with no issues  Review of systems negative unless otherwise specified in HPI  Review of Systems   Constitutional: Negative for chills, fever and unexpected weight change  HENT: Negative for hearing loss, nosebleeds and sore throat  Eyes: Negative for pain, redness and visual disturbance  Respiratory: Negative for cough, shortness of breath and wheezing  Cardiovascular: Negative for chest pain, palpitations and leg swelling  Gastrointestinal: Negative for abdominal pain and nausea  Genitourinary: Negative for dyspareunia, dysuria and frequency     Skin: Negative for rash and wound    Neurological: Negative for dizziness, numbness and headaches  Psychiatric/Behavioral: Negative for decreased concentration and suicidal ideas  The patient is not nervous/anxious          Past Medical History:   Diagnosis Date    Cervicalgia     last assessed: 10/23/2012    Fibroid 01/2015    US reveals 1 8 cm rt intramural, 4 8 pedunculated fibroid rt adnexa    Insomnia        Past Surgical History:   Procedure Laterality Date    LAPAROSCOPIC CHOLECYSTECTOMY         Family History   Problem Relation Age of Onset    Stroke Mother     Stroke Father     Stroke Brother     Diabetes Family     Hyperlipidemia Family     Hypertension Family     Breast cancer Cousin 48    No Known Problems Maternal Aunt     No Known Problems Maternal Aunt     No Known Problems Maternal Aunt     No Known Problems Maternal Aunt     No Known Problems Paternal Aunt     Dementia Sister        Social History     Occupational History    Not on file   Tobacco Use    Smoking status: Never Smoker    Smokeless tobacco: Never Used   Substance and Sexual Activity    Alcohol use: Yes     Comment: social    Drug use: No    Sexual activity: Yes     Birth control/protection: Post-menopausal         Current Outpatient Medications:     aspirin (ECOTRIN LOW STRENGTH) 81 mg EC tablet, Take 1 tablet (81 mg total) by mouth daily, Disp:  , Rfl:     atorvastatin (LIPITOR) 40 mg tablet, Take 1 tablet (40 mg total) by mouth daily, Disp: 90 tablet, Rfl: 3    cetirizine (ZyrTEC) 10 mg tablet, Take 10 mg by mouth daily, Disp: , Rfl:     levothyroxine 50 mcg tablet, Take 1 tablet (50 mcg total) by mouth daily, Disp: 90 tablet, Rfl: 3    Multiple Vitamin (MULTIVITAMINS PO), Take 1 tablet by mouth daily, Disp: , Rfl:     Omega-3 Fatty Acids (OMEGA-3 FISH OIL) 1200 MG CAPS, Take by mouth, Disp: , Rfl:     VITAMIN D PO, Take 1 capsule by mouth daily, Disp: , Rfl:     ondansetron (ZOFRAN-ODT) 4 mg disintegrating tablet, Take 1 tablet (4 mg total) by mouth every 6 (six) hours as needed for nausea or vomiting (Patient not taking: Reported on 5/25/2021), Disp: 20 tablet, Rfl: 0    Allergies   Allergen Reactions    Osphena [Ospemifene] Hives    Food     Nuts - Food Allergy     Pollen Extract     Sesame Seed (Diagnostic) - Food Allergy     Sulfamethoxazole-Trimethoprim Headache    Wheat Bran - Food Allergy             Vitals:    05/25/21 1020   BP: 130/61   Pulse: 65       Objective:                    Right Knee Exam     Muscle Strength   The patient has normal right knee strength  Tenderness   The patient is experiencing no tenderness  Range of Motion   Extension: 0   Flexion: 130     Tests   Varus: negative Valgus: negative    Other   Erythema: absent  Sensation: normal  Pulse: present  Swelling: none  Effusion: no effusion present    Comments:   Calf is soft and nontender            Diagnostics, reviewed and taken today if performed as documented:    None performed      The attending physician has personally reviewed the pertinent films in PACS and interpretation is as follows:      Procedures, if performed today:    Procedures    None performed      Portions of the record may have been created with voice recognition software  Occasional wrong word or "sound a like" substitutions may have occurred due to the inherent limitations of voice recognition software  Read the chart carefully and recognize, using context, where substitutions have occurred

## 2021-06-04 ENCOUNTER — CLINICAL SUPPORT (OUTPATIENT)
Dept: INTERNAL MEDICINE CLINIC | Facility: CLINIC | Age: 56
End: 2021-06-04
Payer: COMMERCIAL

## 2021-06-04 DIAGNOSIS — Z23 NEED FOR VACCINATION: Primary | ICD-10-CM

## 2021-06-04 PROCEDURE — 90750 HZV VACC RECOMBINANT IM: CPT

## 2021-06-04 PROCEDURE — 90471 IMMUNIZATION ADMIN: CPT

## 2021-06-07 NOTE — PROGRESS NOTES
DISCHARGE    Jazmyn Gutierrezer attended physical therapy for R knee sprain/strain  She made excellent progress in reducing sx and returning to full activity level  She has returned to work and has discontinued treatment at this time    Thank you for your referral

## 2021-06-10 ENCOUNTER — APPOINTMENT (OUTPATIENT)
Dept: LAB | Facility: HOSPITAL | Age: 56
End: 2021-06-10
Payer: COMMERCIAL

## 2021-06-10 DIAGNOSIS — E78.00 HYPERCHOLESTEROLEMIA: ICD-10-CM

## 2021-06-10 DIAGNOSIS — E03.9 HYPOTHYROIDISM, UNSPECIFIED TYPE: ICD-10-CM

## 2021-06-10 DIAGNOSIS — Z13.1 DIABETES MELLITUS SCREENING: ICD-10-CM

## 2021-06-10 LAB
ALBUMIN SERPL BCP-MCNC: 4.3 G/DL (ref 3.5–5)
ALP SERPL-CCNC: 68 U/L (ref 46–116)
ALT SERPL W P-5'-P-CCNC: 39 U/L (ref 12–78)
ANION GAP SERPL CALCULATED.3IONS-SCNC: 7 MMOL/L (ref 4–13)
AST SERPL W P-5'-P-CCNC: 21 U/L (ref 5–45)
BILIRUB SERPL-MCNC: 0.58 MG/DL (ref 0.2–1)
BUN SERPL-MCNC: 12 MG/DL (ref 5–25)
CALCIUM SERPL-MCNC: 10.1 MG/DL (ref 8.3–10.1)
CHLORIDE SERPL-SCNC: 106 MMOL/L (ref 100–108)
CHOLEST SERPL-MCNC: 190 MG/DL (ref 50–200)
CO2 SERPL-SCNC: 26 MMOL/L (ref 21–32)
CREAT SERPL-MCNC: 0.69 MG/DL (ref 0.6–1.3)
ERYTHROCYTE [DISTWIDTH] IN BLOOD BY AUTOMATED COUNT: 14.2 % (ref 11.6–15.1)
EST. AVERAGE GLUCOSE BLD GHB EST-MCNC: 123 MG/DL
GFR SERPL CREATININE-BSD FRML MDRD: 96 ML/MIN/1.73SQ M
GLUCOSE SERPL-MCNC: 92 MG/DL (ref 65–140)
HBA1C MFR BLD: 5.9 %
HCT VFR BLD AUTO: 36.8 % (ref 34.8–46.1)
HDLC SERPL-MCNC: 54 MG/DL
HGB BLD-MCNC: 12.2 G/DL (ref 11.5–15.4)
LDLC SERPL CALC-MCNC: 98 MG/DL (ref 0–100)
MCH RBC QN AUTO: 28.6 PG (ref 26.8–34.3)
MCHC RBC AUTO-ENTMCNC: 33.2 G/DL (ref 31.4–37.4)
MCV RBC AUTO: 86 FL (ref 82–98)
PLATELET # BLD AUTO: 314 THOUSANDS/UL (ref 149–390)
PMV BLD AUTO: 11 FL (ref 8.9–12.7)
POTASSIUM SERPL-SCNC: 3.9 MMOL/L (ref 3.5–5.3)
PROT SERPL-MCNC: 7.9 G/DL (ref 6.4–8.2)
RBC # BLD AUTO: 4.26 MILLION/UL (ref 3.81–5.12)
SODIUM SERPL-SCNC: 139 MMOL/L (ref 136–145)
TRIGL SERPL-MCNC: 189 MG/DL
TSH SERPL DL<=0.05 MIU/L-ACNC: 1.33 UIU/ML (ref 0.36–3.74)
WBC # BLD AUTO: 6.87 THOUSAND/UL (ref 4.31–10.16)

## 2021-06-10 PROCEDURE — 85027 COMPLETE CBC AUTOMATED: CPT

## 2021-06-10 PROCEDURE — 80053 COMPREHEN METABOLIC PANEL: CPT

## 2021-06-10 PROCEDURE — 84443 ASSAY THYROID STIM HORMONE: CPT

## 2021-06-10 PROCEDURE — 36415 COLL VENOUS BLD VENIPUNCTURE: CPT

## 2021-06-10 PROCEDURE — 83036 HEMOGLOBIN GLYCOSYLATED A1C: CPT

## 2021-06-10 PROCEDURE — 80061 LIPID PANEL: CPT

## 2021-07-27 ENCOUNTER — OFFICE VISIT (OUTPATIENT)
Dept: INTERNAL MEDICINE CLINIC | Facility: CLINIC | Age: 56
End: 2021-07-27
Payer: COMMERCIAL

## 2021-07-27 VITALS
HEIGHT: 65 IN | BODY MASS INDEX: 29.99 KG/M2 | DIASTOLIC BLOOD PRESSURE: 68 MMHG | HEART RATE: 63 BPM | WEIGHT: 180 LBS | RESPIRATION RATE: 16 BRPM | OXYGEN SATURATION: 97 % | SYSTOLIC BLOOD PRESSURE: 124 MMHG

## 2021-07-27 DIAGNOSIS — Z12.31 BREAST CANCER SCREENING BY MAMMOGRAM: ICD-10-CM

## 2021-07-27 DIAGNOSIS — E03.9 HYPOTHYROIDISM, UNSPECIFIED TYPE: ICD-10-CM

## 2021-07-27 DIAGNOSIS — R06.83 SNORING: ICD-10-CM

## 2021-07-27 DIAGNOSIS — M72.2 PLANTAR FASCIITIS: ICD-10-CM

## 2021-07-27 DIAGNOSIS — E78.00 HYPERCHOLESTEROLEMIA: ICD-10-CM

## 2021-07-27 DIAGNOSIS — Z00.00 ANNUAL PHYSICAL EXAM: Primary | ICD-10-CM

## 2021-07-27 DIAGNOSIS — F33.1 MODERATE EPISODE OF RECURRENT MAJOR DEPRESSIVE DISORDER (HCC): ICD-10-CM

## 2021-07-27 PROCEDURE — 99396 PREV VISIT EST AGE 40-64: CPT | Performed by: INTERNAL MEDICINE

## 2021-07-27 RX ORDER — ROSUVASTATIN CALCIUM 20 MG/1
20 TABLET, COATED ORAL DAILY
Qty: 90 TABLET | Refills: 3 | Status: SHIPPED | OUTPATIENT
Start: 2021-07-27

## 2021-07-27 RX ORDER — BUPROPION HYDROCHLORIDE 150 MG/1
150 TABLET ORAL EVERY MORNING
Qty: 30 TABLET | Refills: 3 | Status: SHIPPED | OUTPATIENT
Start: 2021-07-27 | End: 2021-11-22

## 2021-07-27 NOTE — PROGRESS NOTES
[facility]    NAME: Mary Fatima  AGE: 60 y o  SEX: female  : 1965     DATE: 2021     Assessment and Plan:     Problem List Items Addressed This Visit        Other    Hypercholesterolemia    Relevant Medications    rosuvastatin (CRESTOR) 20 MG tablet      Other Visit Diagnoses     Annual physical exam    -  Primary    Snoring        Relevant Orders    Diagnostic Sleep Study    Hypothyroidism, unspecified type        Moderate episode of recurrent major depressive disorder (HCC)        Relevant Medications    buPROPion (WELLBUTRIN XL) 150 mg 24 hr tablet    Breast cancer screening by mammogram        Relevant Orders    Mammo screening bilateral w 3d & cad    Plantar fasciitis              Immunizations and preventive care screenings were discussed with patient today  Appropriate education was printed on patient's after visit summary  Counseling:  · Exercise: the importance of regular exercise/physical activity was discussed  Recommend exercise 3-5 times per week for at least 30 minutes  BMI Counseling: Body mass index is 29 95 kg/m²  The BMI is above normal  Nutrition recommendations include consuming healthier snacks and moderation in carbohydrate intake  Start bupropion  Declines counseling at this time    Ok to switch to rosuvastatin if she prefers that vs atorvastatin    Continue exercises for plantar fasciitis  May take Aleve OTC 2 tabs with food BID for the next 3-5 days       Return in about 6 weeks (around 2021)  Chief Complaint:     Chief Complaint   Patient presents with    Annual Exam      History of Present Illness:     Adult Annual Physical   Patient here for a comprehensive physical exam  The patient reports problems - depression    Asking about switching to Crestor from atorvastatin suggested by her brother in law  Denies myalgias    Diet and Physical Activity  · Diet/Nutrition: well balanced diet, frequent junk food and adequate fiber intake  · Exercise: walking  Depression Screening  PHQ-9 Depression Screening    PHQ-9:   Frequency of the following problems over the past two weeks:      Little interest or pleasure in doing things: 2 - more than half the days  Feeling down, depressed, or hopeless: 3 - nearly every day  Trouble falling or staying asleep, or sleeping too much: 3 - nearly every day  Feeling tired or having little energy: 3 - nearly every day  Poor appetite or overeating: 3 - nearly every day  Feeling bad about yourself - or that you are a failure or have let yourself or your family down: 1 - several days  Trouble concentrating on things, such as reading the newspaper or watching television: 1 - several days  Moving or speaking so slowly that other people could have noticed  Or the opposite - being so fidgety or restless that you have been moving around a lot more than usual: 1 - several days  Thoughts that you would be better off dead, or of hurting yourself in some way: 0 - not at all  PHQ-2 Score: 5  PHQ-9 Score: 17       General Health  · Sleep: sleeps poorly  · Hearing: normal - bilateral   · Vision: most recent eye exam <1 year ago and wears glasses  · Dental: regular dental visits  /GYN Health  · Patient is: postmenopausal       Review of Systems:     Review of Systems   Constitutional: Positive for fatigue (more noticeable x 2 weeks; snores heavily) and unexpected weight change (weight gain)  Negative for chills and fever  HENT: Negative for rhinorrhea  Respiratory: Negative for cough and shortness of breath  Cardiovascular: Negative for chest pain and palpitations  Gastrointestinal: Negative for abdominal pain, constipation and diarrhea  Genitourinary: Negative for difficulty urinating and vaginal bleeding     Musculoskeletal:        Left heel pain for the past week and a half and starting to perform exercises provided by therapy Neurological: Negative for dizziness and headaches  Psychiatric/Behavioral: Positive for dysphoric mood (related to work, sister's poor health) and sleep disturbance  Negative for suicidal ideas  Past Medical History:     Past Medical History:   Diagnosis Date    Cervicalgia     last assessed: 10/23/2012    Fibroid 01/2015    US reveals 1 8 cm rt intramural, 4 8 pedunculated fibroid rt adnexa    Insomnia       Past Surgical History:     Past Surgical History:   Procedure Laterality Date    LAPAROSCOPIC CHOLECYSTECTOMY        Social History:     Social History     Socioeconomic History    Marital status: /Civil Union     Spouse name: None    Number of children: None    Years of education: None    Highest education level: None   Occupational History    None   Tobacco Use    Smoking status: Never Smoker    Smokeless tobacco: Never Used   Vaping Use    Vaping Use: Never used   Substance and Sexual Activity    Alcohol use: Yes     Comment: social    Drug use: No    Sexual activity: Yes     Birth control/protection: Post-menopausal   Other Topics Concern    None   Social History Narrative    None     Social Determinants of Health     Financial Resource Strain:     Difficulty of Paying Living Expenses:    Food Insecurity:     Worried About Running Out of Food in the Last Year:     Ran Out of Food in the Last Year:    Transportation Needs:     Lack of Transportation (Medical):      Lack of Transportation (Non-Medical):    Physical Activity:     Days of Exercise per Week:     Minutes of Exercise per Session:    Stress:     Feeling of Stress :    Social Connections:     Frequency of Communication with Friends and Family:     Frequency of Social Gatherings with Friends and Family:     Attends [employer]ism Services:     Active Member of Clubs or Organizations:     Attends Club or Organization Meetings:     Marital Status:    Intimate Partner Violence:     Fear of Current or Ex-Partner:     Emotionally Abused:     Physically Abused:     Sexually Abused:       Family History:     Family History   Problem Relation Age of Onset    Stroke Mother     Stroke Father     Stroke Brother     Diabetes Family     Hyperlipidemia Family     Hypertension Family     Breast cancer Cousin 48    No Known Problems Maternal Aunt     No Known Problems Maternal Aunt     No Known Problems Maternal Aunt     No Known Problems Maternal Aunt     No Known Problems Paternal Aunt     Dementia Sister       Current Medications:     Current Outpatient Medications   Medication Sig Dispense Refill    aspirin (ECOTRIN LOW STRENGTH) 81 mg EC tablet Take 1 tablet (81 mg total) by mouth daily      cetirizine (ZyrTEC) 10 mg tablet Take 10 mg by mouth daily      levothyroxine 50 mcg tablet Take 1 tablet (50 mcg total) by mouth daily 90 tablet 3    Multiple Vitamin (MULTIVITAMINS PO) Take 1 tablet by mouth daily      Omega-3 Fatty Acids (OMEGA-3 FISH OIL) 1200 MG CAPS Take by mouth      VITAMIN D PO Take 1 capsule by mouth daily      buPROPion (WELLBUTRIN XL) 150 mg 24 hr tablet Take 1 tablet (150 mg total) by mouth every morning 30 tablet 3    rosuvastatin (CRESTOR) 20 MG tablet Take 1 tablet (20 mg total) by mouth daily 90 tablet 3     No current facility-administered medications for this visit  Allergies: Allergies   Allergen Reactions    Osphena [Ospemifene] Hives    Food     Nuts - Food Allergy     Pollen Extract     Sesame Seed (Diagnostic) - Food Allergy     Sulfamethoxazole-Trimethoprim Headache    Wheat Bran - Food Allergy       Physical Exam:     /68   Pulse 63   Resp 16   Ht 5' 5" (1 651 m)   Wt 81 6 kg (180 lb)   SpO2 97%   BMI 29 95 kg/m²     Physical Exam  Constitutional:       General: She is not in acute distress  Appearance: She is well-developed  She is not ill-appearing, toxic-appearing or diaphoretic  HENT:      Head: Normocephalic and atraumatic  Eyes:      Conjunctiva/sclera: Conjunctivae normal    Cardiovascular:      Rate and Rhythm: Normal rate and regular rhythm  Heart sounds: Normal heart sounds  No murmur heard  Pulmonary:      Effort: Pulmonary effort is normal  No respiratory distress  Breath sounds: Normal breath sounds  No stridor  No wheezing or rales  Abdominal:      General: There is no distension  Palpations: Abdomen is soft  There is no mass  Tenderness: There is no abdominal tenderness  There is no guarding or rebound  Musculoskeletal:      Cervical back: Neck supple  Right lower leg: No edema  Left lower leg: No edema  Skin:     General: Skin is warm and dry  Neurological:      Mental Status: She is alert and oriented to person, place, and time  Psychiatric:         Mood and Affect: Mood is depressed  Behavior: Behavior normal          Thought Content:  Thought content normal          Judgment: Judgment normal       Comments: PHQ9=17          Laurence Medina MD  [facility]

## 2021-07-27 NOTE — PATIENT INSTRUCTIONS

## 2021-08-27 ENCOUNTER — CLINICAL SUPPORT (OUTPATIENT)
Dept: INTERNAL MEDICINE CLINIC | Facility: CLINIC | Age: 56
End: 2021-08-27
Payer: COMMERCIAL

## 2021-08-27 DIAGNOSIS — Z23 NEED FOR VACCINATION: Primary | ICD-10-CM

## 2021-08-27 PROCEDURE — 90471 IMMUNIZATION ADMIN: CPT

## 2021-08-27 PROCEDURE — 90750 HZV VACC RECOMBINANT IM: CPT

## 2021-09-08 ENCOUNTER — TELEPHONE (OUTPATIENT)
Dept: INTERNAL MEDICINE CLINIC | Facility: CLINIC | Age: 56
End: 2021-09-08

## 2021-09-08 NOTE — TELEPHONE ENCOUNTER
Called patient to reschedule an appointment she had today with you this afternoon  She did not want to reschedule at this time, she said she was following up because you had started her on a new Rx  She is going well on the new Rx, no side effects if she needs a f/u she will call

## 2021-09-08 NOTE — TELEPHONE ENCOUNTER
Recommend to schedule a f/u before the end of the year  Sorry again I needed to cancel  I entered orders for labs to be done in December to check her cholesterol thyroid and metabolic panel since we switched to a different cholesterol pill

## 2021-09-23 ENCOUNTER — TELEPHONE (OUTPATIENT)
Dept: SLEEP CENTER | Facility: CLINIC | Age: 56
End: 2021-09-23

## 2021-09-23 NOTE — TELEPHONE ENCOUNTER
----- Message from Antony Roche MD sent at 9/23/2021 11:49 AM EDT -----  Approved  ----- Message -----  From: Elodia Landa  Sent: 3/01/9135   1:24 PM EDT  To: Sleep Medicine [facility] Provider    This sleep study needs approval      If approved please sign and return to clerical pool  If denied please include reasons why  Also provide alternative testing if warranted  Please sign and return to clerical pool

## 2021-10-09 ENCOUNTER — IMMUNIZATIONS (OUTPATIENT)
Dept: FAMILY MEDICINE CLINIC | Facility: HOSPITAL | Age: 56
End: 2021-10-09

## 2021-10-09 DIAGNOSIS — Z23 ENCOUNTER FOR IMMUNIZATION: Primary | ICD-10-CM

## 2021-10-09 PROCEDURE — 91300 SARS-COV-2 / COVID-19 MRNA VACCINE (PFIZER-BIONTECH) 30 MCG: CPT

## 2021-10-09 PROCEDURE — 0001A SARS-COV-2 / COVID-19 MRNA VACCINE (PFIZER-BIONTECH) 30 MCG: CPT

## 2021-10-11 ENCOUNTER — ANNUAL EXAM (OUTPATIENT)
Dept: OBGYN CLINIC | Facility: CLINIC | Age: 56
End: 2021-10-11
Payer: COMMERCIAL

## 2021-10-11 VITALS
SYSTOLIC BLOOD PRESSURE: 126 MMHG | DIASTOLIC BLOOD PRESSURE: 76 MMHG | HEIGHT: 65 IN | WEIGHT: 176 LBS | BODY MASS INDEX: 29.32 KG/M2

## 2021-10-11 DIAGNOSIS — Z12.31 ENCOUNTER FOR SCREENING MAMMOGRAM FOR BREAST CANCER: ICD-10-CM

## 2021-10-11 DIAGNOSIS — Z01.419 ENCOUNTER FOR ANNUAL ROUTINE GYNECOLOGICAL EXAMINATION: Primary | ICD-10-CM

## 2021-10-11 PROCEDURE — 99396 PREV VISIT EST AGE 40-64: CPT | Performed by: OBSTETRICS & GYNECOLOGY

## 2021-10-20 ENCOUNTER — TELEPHONE (OUTPATIENT)
Dept: INTERNAL MEDICINE CLINIC | Facility: CLINIC | Age: 56
End: 2021-10-20

## 2021-11-22 DIAGNOSIS — F33.1 MODERATE EPISODE OF RECURRENT MAJOR DEPRESSIVE DISORDER (HCC): ICD-10-CM

## 2021-11-22 RX ORDER — BUPROPION HYDROCHLORIDE 150 MG/1
TABLET ORAL
Qty: 30 TABLET | Refills: 0 | Status: SHIPPED | OUTPATIENT
Start: 2021-11-22 | End: 2021-12-13

## 2021-11-24 ENCOUNTER — APPOINTMENT (OUTPATIENT)
Dept: LAB | Facility: HOSPITAL | Age: 56
End: 2021-11-24
Payer: COMMERCIAL

## 2021-11-24 DIAGNOSIS — E78.00 HYPERCHOLESTEROLEMIA: ICD-10-CM

## 2021-11-24 DIAGNOSIS — E03.9 HYPOTHYROIDISM, UNSPECIFIED TYPE: ICD-10-CM

## 2021-11-24 LAB
ALBUMIN SERPL BCP-MCNC: 4 G/DL (ref 3.5–5)
ALP SERPL-CCNC: 72 U/L (ref 46–116)
ALT SERPL W P-5'-P-CCNC: 37 U/L (ref 12–78)
ANION GAP SERPL CALCULATED.3IONS-SCNC: 8 MMOL/L (ref 4–13)
AST SERPL W P-5'-P-CCNC: 23 U/L (ref 5–45)
BILIRUB SERPL-MCNC: 1.13 MG/DL (ref 0.2–1)
BUN SERPL-MCNC: 13 MG/DL (ref 5–25)
CALCIUM SERPL-MCNC: 9.4 MG/DL (ref 8.3–10.1)
CHLORIDE SERPL-SCNC: 106 MMOL/L (ref 100–108)
CHOLEST SERPL-MCNC: 205 MG/DL
CO2 SERPL-SCNC: 24 MMOL/L (ref 21–32)
CREAT SERPL-MCNC: 0.74 MG/DL (ref 0.6–1.3)
GFR SERPL CREATININE-BSD FRML MDRD: 90 ML/MIN/1.73SQ M
GLUCOSE P FAST SERPL-MCNC: 106 MG/DL (ref 65–99)
HDLC SERPL-MCNC: 62 MG/DL
LDLC SERPL CALC-MCNC: 102 MG/DL (ref 0–100)
POTASSIUM SERPL-SCNC: 3.9 MMOL/L (ref 3.5–5.3)
PROT SERPL-MCNC: 7.7 G/DL (ref 6.4–8.2)
SODIUM SERPL-SCNC: 138 MMOL/L (ref 136–145)
T4 FREE SERPL-MCNC: 0.65 NG/DL (ref 0.76–1.46)
TRIGL SERPL-MCNC: 206 MG/DL
TSH SERPL DL<=0.05 MIU/L-ACNC: 4.06 UIU/ML (ref 0.36–3.74)

## 2021-11-24 PROCEDURE — 80061 LIPID PANEL: CPT

## 2021-11-24 PROCEDURE — 80053 COMPREHEN METABOLIC PANEL: CPT

## 2021-11-24 PROCEDURE — 36415 COLL VENOUS BLD VENIPUNCTURE: CPT

## 2021-11-24 PROCEDURE — 84439 ASSAY OF FREE THYROXINE: CPT

## 2021-11-24 PROCEDURE — 84443 ASSAY THYROID STIM HORMONE: CPT

## 2021-12-09 ENCOUNTER — TELEPHONE (OUTPATIENT)
Dept: INTERNAL MEDICINE CLINIC | Facility: CLINIC | Age: 56
End: 2021-12-09

## 2021-12-13 ENCOUNTER — TELEPHONE (OUTPATIENT)
Dept: SLEEP CENTER | Facility: CLINIC | Age: 56
End: 2021-12-13

## 2021-12-13 ENCOUNTER — OFFICE VISIT (OUTPATIENT)
Dept: INTERNAL MEDICINE CLINIC | Facility: CLINIC | Age: 56
End: 2021-12-13
Payer: COMMERCIAL

## 2021-12-13 VITALS
HEIGHT: 65 IN | TEMPERATURE: 98.2 F | RESPIRATION RATE: 16 BRPM | WEIGHT: 174.6 LBS | SYSTOLIC BLOOD PRESSURE: 120 MMHG | HEART RATE: 71 BPM | BODY MASS INDEX: 29.09 KG/M2 | DIASTOLIC BLOOD PRESSURE: 72 MMHG | OXYGEN SATURATION: 97 %

## 2021-12-13 DIAGNOSIS — E03.9 HYPOTHYROIDISM, UNSPECIFIED TYPE: ICD-10-CM

## 2021-12-13 DIAGNOSIS — F33.1 MODERATE EPISODE OF RECURRENT MAJOR DEPRESSIVE DISORDER (HCC): ICD-10-CM

## 2021-12-13 DIAGNOSIS — E78.00 HYPERCHOLESTEROLEMIA: ICD-10-CM

## 2021-12-13 DIAGNOSIS — Z00.00 LABORATORY EXAM ORDERED AS PART OF ROUTINE GENERAL MEDICAL EXAMINATION: ICD-10-CM

## 2021-12-13 DIAGNOSIS — R06.83 SNORING: Primary | ICD-10-CM

## 2021-12-13 PROCEDURE — 99214 OFFICE O/P EST MOD 30 MIN: CPT | Performed by: INTERNAL MEDICINE

## 2021-12-13 RX ORDER — BUPROPION HYDROCHLORIDE 150 MG/1
150 TABLET ORAL DAILY
Qty: 90 TABLET | Refills: 3 | Status: SHIPPED | OUTPATIENT
Start: 2021-12-13

## 2021-12-13 RX ORDER — OMEGA-3S/DHA/EPA/FISH OIL/D3 300MG-1000
400 CAPSULE ORAL DAILY
COMMUNITY

## 2021-12-13 RX ORDER — ASCORBATE CALCIUM 500 MG
500 TABLET ORAL DAILY
COMMUNITY
End: 2022-07-07

## 2021-12-13 RX ORDER — LEVOTHYROXINE SODIUM 0.07 MG/1
75 TABLET ORAL DAILY
Qty: 90 TABLET | Refills: 3 | Status: SHIPPED | OUTPATIENT
Start: 2021-12-13 | End: 2022-07-11

## 2021-12-13 RX ORDER — BUPROPION HYDROCHLORIDE 150 MG/1
TABLET ORAL
Qty: 30 TABLET | Refills: 0 | Status: SHIPPED | OUTPATIENT
Start: 2021-12-13 | End: 2021-12-13 | Stop reason: SDUPTHER

## 2021-12-13 NOTE — TELEPHONE ENCOUNTER
----- Message from Bashir Santos MD sent at 12/13/2021 10:05 AM EST -----  Needs to have documentation of other symptoms apart from snoring (or risk factors) to justify sleep study  ----- Message -----  From: Ariana Bruner  Sent: 12/13/2021   9:41 AM EST  To: Sleep Medicine [facility] Provider    This sleep study needs approval      If approved please sign and return to clerical pool  If denied please include reasons why  Also provide alternative testing if warranted  Please sign and return to clerical pool

## 2021-12-13 NOTE — TELEPHONE ENCOUNTER
Please call patient to ask if for symptoms other than snoring to get sleep study approved  Any fatigue, daytime somnolence, feeling of unrefreshed sleep, morning headaches, witnessed episodes that she is not breathing during sleep, waking herself up choking/gasping for air?

## 2021-12-15 ENCOUNTER — HOSPITAL ENCOUNTER (OUTPATIENT)
Dept: SLEEP CENTER | Facility: CLINIC | Age: 56
Discharge: HOME | End: 2021-12-15
Payer: COMMERCIAL

## 2021-12-15 ENCOUNTER — HOSPITAL ENCOUNTER (OUTPATIENT)
Dept: RADIOLOGY | Age: 56
Discharge: HOME | End: 2021-12-15
Payer: COMMERCIAL

## 2021-12-15 ENCOUNTER — TELEPHONE (OUTPATIENT)
Dept: SLEEP CENTER | Facility: CLINIC | Age: 56
End: 2021-12-15

## 2021-12-15 VITALS — HEIGHT: 65 IN | BODY MASS INDEX: 28.99 KG/M2 | WEIGHT: 174 LBS

## 2021-12-15 DIAGNOSIS — Z12.31 BREAST CANCER SCREENING BY MAMMOGRAM: ICD-10-CM

## 2021-12-15 DIAGNOSIS — R06.83 SNORING: ICD-10-CM

## 2021-12-15 PROCEDURE — 77067 SCR MAMMO BI INCL CAD: CPT

## 2021-12-15 PROCEDURE — 77063 BREAST TOMOSYNTHESIS BI: CPT

## 2021-12-15 PROCEDURE — G0399 HOME SLEEP TEST/TYPE 3 PORTA: HCPCS

## 2021-12-15 PROCEDURE — G0399 HOME SLEEP TEST/TYPE 3 PORTA: HCPCS | Performed by: INTERNAL MEDICINE

## 2021-12-22 ENCOUNTER — TELEPHONE (OUTPATIENT)
Dept: SLEEP CENTER | Facility: CLINIC | Age: 56
End: 2021-12-22

## 2022-01-03 ENCOUNTER — TELEPHONE (OUTPATIENT)
Dept: INTERNAL MEDICINE CLINIC | Facility: CLINIC | Age: 57
End: 2022-01-03

## 2022-01-03 NOTE — TELEPHONE ENCOUNTER
Advised pt
Ciprofloxacin and metronidazole antibiotics, same as what she took before sent  She must eat a low residue diet low in fiber, plenty of fluids until the pain subsides  If the pain gets worse, she has a fever, chills, nausea, vomiting, she must go to the ER
Pt calling in stating she believes she is having a bout of diverticulitis, having the same symptoms she was having when she went to the ER and she was diagnosed with diverticulitis, stomach cramps, pain in her left side, hard time pooping, not feeling well  Alfa Garcia asking what she should do?
Patient

## 2022-03-14 ENCOUNTER — OFFICE VISIT (OUTPATIENT)
Dept: INTERNAL MEDICINE CLINIC | Facility: CLINIC | Age: 57
End: 2022-03-14
Payer: COMMERCIAL

## 2022-03-14 ENCOUNTER — TELEPHONE (OUTPATIENT)
Dept: INTERNAL MEDICINE CLINIC | Facility: CLINIC | Age: 57
End: 2022-03-14

## 2022-03-14 VITALS
SYSTOLIC BLOOD PRESSURE: 122 MMHG | WEIGHT: 172.6 LBS | BODY MASS INDEX: 28.76 KG/M2 | HEIGHT: 65 IN | OXYGEN SATURATION: 97 % | HEART RATE: 67 BPM | DIASTOLIC BLOOD PRESSURE: 82 MMHG

## 2022-03-14 DIAGNOSIS — Z12.4 SCREENING FOR CERVICAL CANCER: ICD-10-CM

## 2022-03-14 DIAGNOSIS — K57.92 DIVERTICULITIS: Primary | ICD-10-CM

## 2022-03-14 PROCEDURE — 99213 OFFICE O/P EST LOW 20 MIN: CPT | Performed by: NURSE PRACTITIONER

## 2022-03-14 RX ORDER — CIPROFLOXACIN 500 MG/1
500 TABLET, FILM COATED ORAL EVERY 12 HOURS SCHEDULED
Qty: 20 TABLET | Refills: 0 | Status: SHIPPED | OUTPATIENT
Start: 2022-03-14 | End: 2022-03-24

## 2022-03-14 RX ORDER — METRONIDAZOLE 500 MG/1
500 TABLET ORAL EVERY 8 HOURS SCHEDULED
Qty: 30 TABLET | Refills: 0 | Status: SHIPPED | OUTPATIENT
Start: 2022-03-14 | End: 2022-03-24

## 2022-03-14 NOTE — LETTER
March 14, 2022     Patient: Jhonathan Castillo   YOB: 1965   Date of Visit: 3/14/2022       To Whom it May Concern:    Jhonathan Castillo is under my professional care  She was seen in my office on 3/14/2022  She may return to work on 3/17/22               Sincerely,          ANTOINETTE Celestin        CC: No Recipients

## 2022-03-14 NOTE — LETTER
March 14, 2022     Patient: Huey Wright   YOB: 1965   Date of Visit: 3/14/2022       To Whom it May Concern:    Huey Wright is under my professional care  She was seen in my office on 3/14/2022  She may return to work on 3/16/22               Sincerely,          ANTOINETTE Garcia        CC: No Recipients

## 2022-03-14 NOTE — TELEPHONE ENCOUNTER
Pt calling in stating her diverticulitis started back up a week ago and she thought she could handle it herself by watching her diet etc however last night it go really bad - asking what she should do?

## 2022-03-14 NOTE — PROGRESS NOTES
Assessment/Plan:    Diverticulitis  Start on a clear liquid diet for 2 days and slowly advanced to solid food  Gave patient print out of diet instructions  Start antibiotics and go to ER for worsening       Diagnoses and all orders for this visit:    Diverticulitis  -     ciprofloxacin (CIPRO) 500 mg tablet; Take 1 tablet (500 mg total) by mouth every 12 (twelve) hours for 10 days  -     metroNIDAZOLE (FLAGYL) 500 mg tablet; Take 1 tablet (500 mg total) by mouth every 8 (eight) hours for 10 days    Screening for cervical cancer          Subjective:      Patient ID: Kartik Uribe is a 59 y o  female  Patient is here for a few weeks of abdominal pain and diverticulitis flare up in the left quadrant pain  8/10 pain  Nausea, diarrhea but no blood  No appetite  On liquid diet and soft food diet        The following portions of the patient's history were reviewed and updated as appropriate: allergies, current medications, past family history, past medical history, past social history, past surgical history and problem list     Review of Systems   Constitutional: Negative  HENT: Negative  Eyes: Negative  Respiratory: Negative  Cardiovascular: Negative  Gastrointestinal: Positive for abdominal pain, diarrhea and nausea  Musculoskeletal: Negative  Neurological: Negative  Objective:      /82   Pulse 67   Ht 5' 5" (1 651 m)   Wt 78 3 kg (172 lb 9 6 oz)   SpO2 97%   BMI 28 72 kg/m²          Physical Exam  Vitals and nursing note reviewed  Constitutional:       Appearance: She is well-developed  HENT:      Head: Normocephalic and atraumatic  Right Ear: External ear normal       Left Ear: External ear normal       Nose: Nose normal    Eyes:      Conjunctiva/sclera: Conjunctivae normal       Pupils: Pupils are equal, round, and reactive to light  Cardiovascular:      Rate and Rhythm: Normal rate and regular rhythm     Pulmonary:      Effort: Pulmonary effort is normal  Breath sounds: Normal breath sounds  Abdominal:      General: Bowel sounds are normal       Palpations: Abdomen is soft  Musculoskeletal:         General: Normal range of motion  Cervical back: Normal range of motion and neck supple  Skin:     General: Skin is warm and dry  Neurological:      Mental Status: She is alert and oriented to person, place, and time

## 2022-03-16 NOTE — ASSESSMENT & PLAN NOTE
Start on a clear liquid diet for 2 days and slowly advanced to solid food    Gave patient print out of diet instructions  Start antibiotics and go to ER for worsening

## 2022-03-30 ENCOUNTER — OFFICE VISIT (OUTPATIENT)
Dept: SLEEP CENTER | Facility: CLINIC | Age: 57
End: 2022-03-30
Payer: COMMERCIAL

## 2022-03-30 VITALS
DIASTOLIC BLOOD PRESSURE: 78 MMHG | WEIGHT: 174 LBS | HEIGHT: 65 IN | SYSTOLIC BLOOD PRESSURE: 120 MMHG | BODY MASS INDEX: 28.99 KG/M2 | OXYGEN SATURATION: 97 % | HEART RATE: 85 BPM

## 2022-03-30 DIAGNOSIS — G47.33 OSA (OBSTRUCTIVE SLEEP APNEA): Primary | ICD-10-CM

## 2022-03-30 PROCEDURE — 99203 OFFICE O/P NEW LOW 30 MIN: CPT | Performed by: INTERNAL MEDICINE

## 2022-03-30 NOTE — PROGRESS NOTES
Consultation - Sleep Center   [facility] : 1965  MRN: [id number]       Assessment:  The patient has mild obstructive sleep apnea with HECTOR = 8 6 on home sleep apnea testing  The purpose of the test was to look for obstructive sleep apnea in this patient who has a longstanding history of loud snoring and worsening daytime sleepiness  Her  has not noticed any periods of apnea  We discussed a variety of treatment options, including no treatment at all since her HECTOR is less than 15  She opts for a mandibular advancement device  I will refer her to Dr Dayna Alvarez, who specializes in [place] and is also her regular dentist     Plan:  Evaluate for mandibular advancement device  I suspect that the patient would benefit from APAP as well  She will contact me if needed, particularly if symptoms worsen  Follow up:  prn    History of Present Illness:   59 y  o female with a history of loud snoring and worsening daytime sleepiness  The patient denies any history of hypertension or other cardiovascular disease  She feels as if she has a closed valve in her throat when she lies flat  She has no observed apneas        Review of Systems      Genitourinary hot flashes at night   Cardiology palpitations/fluttering feeling in the chest   Gastrointestinal none   Neurology none   Constitutional none   Integumentary none   Psychiatry none   Musculoskeletal none   Pulmonary none   ENT none   Endocrine none   Hematological none         I have reviewed and updated the review of systems as necessary    Historical Information    Past Medical History:  Hypothyroidism    Family History: non-contributory    Social History     Socioeconomic History    Marital status: /Civil Union     Spouse name: Not on file    Number of children: Not on file    Years of education: Not on file    Highest education level: Not on file   Occupational History    Not on file   Tobacco Use    Smoking status: Never Smoker    Smokeless tobacco: Never Used   Vaping Use    Vaping Use: Never used   Substance and Sexual Activity    Alcohol use: Yes     Comment: social    Drug use: No    Sexual activity: Yes     Birth control/protection: Post-menopausal   Other Topics Concern    Not on file   Social History Narrative    Not on file     Social Determinants of Health     Financial Resource Strain: Not on file   Food Insecurity: Not on file   Transportation Needs: Not on file   Physical Activity: Not on file   Stress: Not on file   Social Connections: Not on file   Intimate Partner Violence: Not on file   Housing Stability: Not on file         Sleep Schedule: unremarkable    Snoring:  Yes    Witnessed Apnea:  No    Medications/Allergies:    Current Outpatient Medications:     aspirin (ECOTRIN LOW STRENGTH) 81 mg EC tablet, Take 1 tablet (81 mg total) by mouth daily, Disp:  , Rfl:     buPROPion (WELLBUTRIN XL) 150 mg 24 hr tablet, Take 1 tablet (150 mg total) by mouth daily, Disp: 90 tablet, Rfl: 3    cetirizine (ZyrTEC) 10 mg tablet, Take 10 mg by mouth daily, Disp: , Rfl:     cholecalciferol (VITAMIN D3) 400 units tablet, Take 400 Units by mouth daily, Disp: , Rfl:     Lactobacillus (PROBIOTIC ACIDOPHILUS PO), Take by mouth, Disp: , Rfl:     levothyroxine 75 mcg tablet, Take 1 tablet (75 mcg total) by mouth daily, Disp: 90 tablet, Rfl: 3    Multiple Vitamin (MULTIVITAMINS PO), Take 1 tablet by mouth daily, Disp: , Rfl:     Omega-3 Fatty Acids (OMEGA-3 FISH OIL) 1200 MG CAPS, Take by mouth, Disp: , Rfl:     rosuvastatin (CRESTOR) 20 MG tablet, Take 1 tablet (20 mg total) by mouth daily, Disp: 90 tablet, Rfl: 3    vitamin E 100 UNIT capsule, Take 100 Units by mouth daily, Disp: , Rfl:     Calcium Ascorbate (VITAMIN C) 500 mg tablet, Take 500 mg by mouth daily (Patient not taking: Reported on 3/14/2022 ), Disp: , Rfl:     VITAMIN D PO, Take 1 capsule by mouth daily (Patient not taking: Reported on 3/14/2022 ), Disp: , Rfl: No notes on file                  Objective:    Vital Signs:   Vitals:    03/30/22 1200   BP: 120/78   Pulse: 85   SpO2: 97%   Weight: 78 9 kg (174 lb)   Height: 5' 5" (1 651 m)     Neck Circumference: 15 5      [place] Sleepiness Scale: Total score: 5    Physical Exam:    General: Alert, appropriate, cooperative, normal build    Head: NC/AT, no retrognathia    Nose: No septal deviation, nares not obstructed, mucosa normal    Throat: Airway diminished, tongue base thickened, no tonsils visualized    Neck: Normal, no thyromegaly or lymphadenopathy, no JVD    Heart: RR, normal S1 and S2, no murmurs    Chest: Clear bilaterally    Extremity: No clubbing, cyanosis, no edema    Skin: Warm, dry    Neuro: No motor abnormalities, cranial nerves appear intact    Sleep Study Results:   HECTOR = 8 6      Counseling / Coordination of Care  A description of the counseling / coordination of care: We discussed the pathophysiology of obstructive sleep apnea as well as the possible treatment options  We also discussed the rationale for positive airway pressure therapy  Board Certified Sleep Specialist    Portions of the record may have been created with voice recognition software  Occasional wrong word or "sound a like" substitutions may have occurred due to the inherent limitations of voice recognition software  Read the chart carefully and recognize, using context, where substitutions have occurred

## 2022-04-04 ENCOUNTER — TELEPHONE (OUTPATIENT)
Dept: SLEEP CENTER | Facility: CLINIC | Age: 57
End: 2022-04-04

## 2022-05-05 ENCOUNTER — TELEPHONE (OUTPATIENT)
Dept: OTHER | Facility: OTHER | Age: 57
End: 2022-05-05

## 2022-05-05 NOTE — TELEPHONE ENCOUNTER
Pt has two missed calls from the office, she believes this may be due to her upcoming apptointment being moved up  No notes located on record  Please call her and leave a message she is currently working

## 2022-05-06 NOTE — TELEPHONE ENCOUNTER
Called pt back regarding her OV with GABE Ortiz on 7/14 at 11:30AM with GBAE Otero    She had initially the OV with Dr Chhaya Jaramillo on 7/14  but due to change in provider's schedule, changed appointment with GABE Ortiz for the same day 7/14 at 11:30AM

## 2022-05-19 ENCOUNTER — APPOINTMENT (OUTPATIENT)
Dept: LAB | Facility: HOSPITAL | Age: 57
End: 2022-05-19
Payer: COMMERCIAL

## 2022-05-19 DIAGNOSIS — Z00.00 LABORATORY EXAM ORDERED AS PART OF ROUTINE GENERAL MEDICAL EXAMINATION: ICD-10-CM

## 2022-05-19 DIAGNOSIS — E78.00 HYPERCHOLESTEROLEMIA: ICD-10-CM

## 2022-05-19 DIAGNOSIS — E03.9 HYPOTHYROIDISM, UNSPECIFIED TYPE: ICD-10-CM

## 2022-05-19 LAB
ALBUMIN SERPL BCP-MCNC: 4.2 G/DL (ref 3.5–5)
ALP SERPL-CCNC: 67 U/L (ref 46–116)
ALT SERPL W P-5'-P-CCNC: 41 U/L (ref 12–78)
ANION GAP SERPL CALCULATED.3IONS-SCNC: 5 MMOL/L (ref 4–13)
AST SERPL W P-5'-P-CCNC: 27 U/L (ref 5–45)
BASOPHILS # BLD AUTO: 0.03 THOUSANDS/ΜL (ref 0–0.1)
BASOPHILS NFR BLD AUTO: 0 % (ref 0–1)
BILIRUB SERPL-MCNC: 0.63 MG/DL (ref 0.2–1)
BUN SERPL-MCNC: 13 MG/DL (ref 5–25)
CALCIUM SERPL-MCNC: 9.7 MG/DL (ref 8.3–10.1)
CHLORIDE SERPL-SCNC: 105 MMOL/L (ref 100–108)
CHOLEST SERPL-MCNC: 176 MG/DL
CO2 SERPL-SCNC: 28 MMOL/L (ref 21–32)
CREAT SERPL-MCNC: 0.86 MG/DL (ref 0.6–1.3)
EOSINOPHIL # BLD AUTO: 0.14 THOUSAND/ΜL (ref 0–0.61)
EOSINOPHIL NFR BLD AUTO: 2 % (ref 0–6)
ERYTHROCYTE [DISTWIDTH] IN BLOOD BY AUTOMATED COUNT: 13.9 % (ref 11.6–15.1)
EST. AVERAGE GLUCOSE BLD GHB EST-MCNC: 120 MG/DL
GFR SERPL CREATININE-BSD FRML MDRD: 74 ML/MIN/1.73SQ M
GLUCOSE P FAST SERPL-MCNC: 91 MG/DL (ref 65–99)
HBA1C MFR BLD: 5.8 %
HCT VFR BLD AUTO: 37.3 % (ref 34.8–46.1)
HDLC SERPL-MCNC: 57 MG/DL
HGB BLD-MCNC: 12.6 G/DL (ref 11.5–15.4)
IMM GRANULOCYTES # BLD AUTO: 0.02 THOUSAND/UL (ref 0–0.2)
IMM GRANULOCYTES NFR BLD AUTO: 0 % (ref 0–2)
LDLC SERPL CALC-MCNC: 83 MG/DL (ref 0–100)
LYMPHOCYTES # BLD AUTO: 2.51 THOUSANDS/ΜL (ref 0.6–4.47)
LYMPHOCYTES NFR BLD AUTO: 34 % (ref 14–44)
MCH RBC QN AUTO: 28.6 PG (ref 26.8–34.3)
MCHC RBC AUTO-ENTMCNC: 33.8 G/DL (ref 31.4–37.4)
MCV RBC AUTO: 85 FL (ref 82–98)
MONOCYTES # BLD AUTO: 0.59 THOUSAND/ΜL (ref 0.17–1.22)
MONOCYTES NFR BLD AUTO: 8 % (ref 4–12)
NEUTROPHILS # BLD AUTO: 4.2 THOUSANDS/ΜL (ref 1.85–7.62)
NEUTS SEG NFR BLD AUTO: 56 % (ref 43–75)
NRBC BLD AUTO-RTO: 0 /100 WBCS
PLATELET # BLD AUTO: 338 THOUSANDS/UL (ref 149–390)
PMV BLD AUTO: 10.8 FL (ref 8.9–12.7)
POTASSIUM SERPL-SCNC: 3.8 MMOL/L (ref 3.5–5.3)
PROT SERPL-MCNC: 7.9 G/DL (ref 6.4–8.2)
RBC # BLD AUTO: 4.4 MILLION/UL (ref 3.81–5.12)
SODIUM SERPL-SCNC: 138 MMOL/L (ref 136–145)
TRIGL SERPL-MCNC: 182 MG/DL
TSH SERPL DL<=0.05 MIU/L-ACNC: 0.67 UIU/ML (ref 0.45–4.5)
WBC # BLD AUTO: 7.49 THOUSAND/UL (ref 4.31–10.16)

## 2022-05-19 PROCEDURE — 83036 HEMOGLOBIN GLYCOSYLATED A1C: CPT

## 2022-05-19 PROCEDURE — 80053 COMPREHEN METABOLIC PANEL: CPT

## 2022-05-19 PROCEDURE — 36415 COLL VENOUS BLD VENIPUNCTURE: CPT

## 2022-05-19 PROCEDURE — 84443 ASSAY THYROID STIM HORMONE: CPT

## 2022-05-19 PROCEDURE — 80061 LIPID PANEL: CPT

## 2022-05-19 PROCEDURE — 85025 COMPLETE CBC W/AUTO DIFF WBC: CPT

## 2022-05-26 ENCOUNTER — TELEMEDICINE (OUTPATIENT)
Dept: INTERNAL MEDICINE CLINIC | Facility: CLINIC | Age: 57
End: 2022-05-26
Payer: COMMERCIAL

## 2022-05-26 ENCOUNTER — TELEPHONE (OUTPATIENT)
Dept: INTERNAL MEDICINE CLINIC | Facility: CLINIC | Age: 57
End: 2022-05-26

## 2022-05-26 DIAGNOSIS — U07.1 COVID-19: Primary | ICD-10-CM

## 2022-05-26 PROCEDURE — 99213 OFFICE O/P EST LOW 20 MIN: CPT | Performed by: INTERNAL MEDICINE

## 2022-05-26 NOTE — TELEPHONE ENCOUNTER
Patient tested positive for covid with at home test 2 (5/24/22) days ago  She feels bad has HA, chills, body aches and yesterday was her worse day  Today she is not feeling as bad she took Dayquil but still feeling bad  She asked if there is any medication for covid that she can take or that you would be able to prescribe?

## 2022-05-26 NOTE — PROGRESS NOTES
COVID-19 Outpatient Progress Note    Assessment/Plan:    Problem List Items Addressed This Visit    None     Visit Diagnoses     COVID-19    -  Primary    Relevant Medications    nirmatrelvir & ritonavir (Paxlovid) tablet therapy pack         Disposition:     Patient has COVID-19 infection  Based off CDC guidelines, they were recommended to isolate for 5 days from the date of the positive test  If they remain asymptomatic, isolation may be ended followed by 5 days of wearing a mask when around othes to minimize risk of infecting others  If they have a fever, continue to stay home until fever resolves for at least 24 hours  Discussed symptom directed medication options with patient  Hold rosuvastatin for the 5 days you are taking Paxlovid    Patient meets criteria for PAXLOVID and they have been counseled appropriately according to EUA documentation released by the FDA  After discussion, patient agrees to treatment  Nelacarina Leos is an investigational medicine used to treat mild-to-moderate COVID-19 in adults and children (15years of age and older weighing at least 80 pounds (40 kg)) with positive results of direct SARS-CoV-2 viral testing, and who are at high risk for progression to severe COVID-19, including hospitalization or death  PAXLOVID is investigational because it is still being studied  There is limited information about the safety and effectiveness of using PAXLOVID to treat people with mild-to-moderate COVID-19  The FDA has authorized the emergency use of PAXLOVID for the treatment of mild-tomoderate COVID-19 in adults and children (15years of age and older weighing at least 80 pounds (40 kg)) with a positive test for the virus that causes COVID-19, and who are at high risk for progression to severe COVID-19, including hospitalization or death, under an EUA  What should I tell my healthcare provider before I take PAXLOVID?     Tell your healthcare provider if you:  - Have any allergies  - Have liver or kidney disease  - Are pregnant or plan to become pregnant  - Are breastfeeding a child  - Have any serious illnesses    Tell your healthcare provider about all the medicines you take, including prescription and over-the-counter medicines, vitamins, and herbal supplements  Some medicines may interact with PAXLOVID and may cause serious side effects  Keep a list of your medicines to show your healthcare provider and pharmacist when you get a new medicine  You can ask your healthcare provider or pharmacist for a list of medicines that interact with PAXLOVID  Do not start taking a new medicine without telling your healthcare provider  Your healthcare provider can tell you if it is safe to take PAXLOVID with other medicines  Tell your healthcare provider if you are taking combined hormonal contraceptive  PAXLOVID may affect how your birth control pills work  Females who are able to become pregnant should use another effective alternative form of contraception or an additional barrier method of contraception  Talk to your healthcare provider if you have any questions about contraceptive methods that might be right for you  How do I take PAXLOVID? PAXLOVID consists of 2 medicines: nirmatrelvir and ritonavir  - Take 2 pink tablets of nirmatrelvir with 1 white tablet of ritonavir by mouth 2 times each day (in the morning and in the evening) for 5 days  For each dose, take all 3 tablets at the same time  - If you have kidney disease, talk to your healthcare provider  You may need a different dose  - Swallow the tablets whole  Do not chew, break, or crush the tablets  - Take PAXLOVID with or without food  - Do not stop taking PAXLOVID without talking to your healthcare provider, even if you feel better  - If you miss a dose of PAXLOVID within 8 hours of the time it is usually taken, take it as soon as you remember   If you miss a dose by more than 8 hours, skip the missed dose and take the next dose at your regular time  Do not take 2 doses of PAXLOVID at the same time  - If you take too much PAXLOVID, call your healthcare provider or go to the nearest hospital emergency room right away  - If you are taking a ritonavir- or cobicistat-containing medicine to treat hepatitis C or Human Immunodeficiency Virus (HIV), you should continue to take your medicine as prescribed by your healthcare provider   - Talk to your healthcare provider if you do not feel better or if you feel worse after 5 days  Who should generally not take PAXLOVID? Do not take PAXLOVID if:  You are allergic to nirmatrelvir, ritonavir, or any of the ingredients in PAXLOVID  You are taking any of the following medicines:  - Alfuzosin  - Pethidine, piroxicam, propoxyphene  - Ranolazine  - Amiodarone, dronedarone, flecainide, propafenone, quinidine  - Colchicine  - Lurasidone, pimozide, clozapine  - Dihydroergotamine, ergotamine, methylergonovine  - Lovastatin, simvastatin  - Sildenafil (Revatio®) for pulmonary arterial hypertension (PAH)  - Triazolam, oral midazolam  - Apalutamide  - Carbamazepine, phenobarbital, phenytoin  - Rifampin  - St  Garcias Wort (hypericum perforatum)    What are the important possible side effects of PAXLOVID? Possible side effects of PAXLOVID are:  - Liver Problems  Tell your healthcare provider right away if you have any of these signs and symptoms of liver problems: loss of appetite, yellowing of your skin and the whites of eyes (jaundice), dark-colored urine, pale colored stools and itchy skin, stomach area (abdominal) pain  - Resistance to HIV Medicines  If you have untreated HIV infection, PAXLOVID may lead to some HIV medicines not working as well in the future  - Other possible side effects include: altered sense of taste, diarrhea, high blood pressure, or muscle aches    These are not all the possible side effects of PAXLOVID  Not many people have taken PAXLOVID   Serious and unexpected side effects may happen  [place] is still being studied, so it is possible that all of the risks are not known at this time  What other treatment choices are there? Like Adaline Ilia may allow for the emergency use of other medicines to treat people with COVID-19  Go to https://[employer]/ for information on the emergency use of other medicines that are authorized by FDA to treat people with COVID-19  Your healthcare provider may talk with you about clinical trials for which you may be eligible  It is your choice to be treated or not to be treated with PAXLOVID  Should you decide not to receive it or for your child not to receive it, it will not change your standard medical care  What if I am pregnant or breastfeeding? There is no experience treating pregnant women or breastfeeding mothers with PAXLOVID  For a mother and unborn baby, the benefit of taking PAXLOVID may be greater than the risk from the treatment  If you are pregnant, discuss your options and specific situation with your healthcare provider  It is recommended that you use effective barrier contraception or do not have sexual activity while taking PAXLOVID  If you are breastfeeding, discuss your options and specific situation with your healthcare provider  How do I report side effects with PAXLOVID? Contact your healthcare provider if you have any side effects that bother you or do not go away  Report side effects to FDA MedWatch at www fda gov/medwatch or call 6-171-CZC3164 or you can report side effects to [facility]  at the contact information provided below  Website Fax number Telephone number   "[employer]" [phone]     How should I store [place]? Store PAXLOVID tablets at room temperature between 68°F to 77°F (20°C to 25°C)      Full fact sheet for patients, parents, and caregivers can be found at: http://www [employer]ush[employer]/    I have spent 15 minutes directly with the patient  Greater than 50% of this time was spent in counseling/coordination of care regarding: instructions for management, risk factor reductions and impressions  Encounter provider Dequan Ridley MD    Provider located at [address] [place] [address] Keena Fishman [id number]    Recent Visits  No visits were found meeting these conditions  Showing recent visits within past 7 days and meeting all other requirements  Today's Visits  Date Type Provider Dept   05/26/22 Benjamin Fenton MD [facility]   05/26/22 Telephone Dequan Ridley MD [facility] today's visits and meeting all other requirements  Future Appointments  No visits were found meeting these conditions  Showing future appointments within next 150 days and meeting all other requirements     This virtual check-in was done via [facility] and patient was informed that this is a secure, HIPAA-compliant platform  She agrees to proceed  Patient agrees to participate in a virtual check in via telephone or video visit instead of presenting to the office to address urgent/immediate medical needs  Patient is aware this is a billable service  After connecting through [facility], the patient was identified by name and date of birth  Onofre Byrnes was informed that this was a telemedicine visit and that the exam was being conducted confidentially over secure lines  My office door was closed  No one else was in the room  Onofre Byrnes acknowledged consent and understanding of privacy and security of the telemedicine visit  I informed the patient that I have reviewed her record in Epic and presented the opportunity for her to ask any questions regarding the visit today  The patient agreed to participate      Verification of patient location:  Patient is located in the following state in which I hold an active license: [place]    Subjective:   Ruby Camp is a 59 y o  female who has been screened for COVID-19  Symptom change since last report: worsening  Patient's symptoms include fever, chills, fatigue, nasal congestion, rhinorrhea, sore throat, cough, chest tightness, myalgias and headache  Patient denies shortness of breath, vomiting and diarrhea  - Date of symptom onset: 5/24/2022  - Date of positive COVID-19 test: 5/24/2022  Type of test: Home antigen  Patient with typical symptoms of COVID-19 and they attest that they were positive on home rapid antigen testing  Image of positive result is not able to be uploaded into their chart  COVID-19 vaccination status: Fully vaccinated with booster    Kurt Mann has been staying home and has isolated themselves in her home  She is taking care to not share personal items and is cleaning all surfaces that are touched often, like counters, tabletops, and doorknobs using household cleaning sprays or wipes  She is wearing a mask when she leaves her room       Taking Dayquil OTC    Lab Results   Component Value Date    SARSCOV2 Not Detected 07/09/2020     Past Medical History:   Diagnosis Date    Cervicalgia     last assessed: 10/23/2012    Fibroid 01/2015    US reveals 1 8 cm rt intramural, 4 8 pedunculated fibroid rt adnexa    Insomnia      Past Surgical History:   Procedure Laterality Date    LAPAROSCOPIC CHOLECYSTECTOMY       Current Outpatient Medications   Medication Sig Dispense Refill    aspirin (ECOTRIN LOW STRENGTH) 81 mg EC tablet Take 1 tablet (81 mg total) by mouth daily      buPROPion (WELLBUTRIN XL) 150 mg 24 hr tablet Take 1 tablet (150 mg total) by mouth daily 90 tablet 3    Calcium Ascorbate (VITAMIN C) 500 mg tablet Take 500 mg by mouth daily      cetirizine (ZyrTEC) 10 mg tablet Take 10 mg by mouth daily      cholecalciferol (VITAMIN D3) 400 units tablet Take 400 Units by mouth daily      Lactobacillus (PROBIOTIC ACIDOPHILUS PO) Take by mouth      levothyroxine 75 mcg tablet Take 1 tablet (75 mcg total) by mouth daily 90 tablet 3    Multiple Vitamin (MULTIVITAMINS PO) Take 1 tablet by mouth daily      nirmatrelvir & ritonavir (Paxlovid) tablet therapy pack Take 3 tablets by mouth 2 (two) times a day for 5 days Take 2 nirmatrelvir tablets + 1 ritonavir tablet together per dose 30 tablet 0    Omega-3 Fatty Acids (OMEGA-3 FISH OIL) 1200 MG CAPS Take by mouth      rosuvastatin (CRESTOR) 20 MG tablet Take 1 tablet (20 mg total) by mouth daily 90 tablet 3    VITAMIN D PO Take 1 capsule by mouth daily      vitamin E 100 UNIT capsule Take 100 Units by mouth daily       No current facility-administered medications for this visit  Allergies   Allergen Reactions    Osphena [Ospemifene] Hives    Food     Nuts - Food Allergy     Pollen Extract     Sesame Seed (Diagnostic) - Food Allergy     Sulfamethoxazole-Trimethoprim Headache    Wheat Bran - Food Allergy        Review of Systems   Constitutional: Positive for chills, fatigue and fever  HENT: Positive for congestion, rhinorrhea and sore throat  Respiratory: Positive for cough and chest tightness  Negative for shortness of breath  Gastrointestinal: Negative for diarrhea and vomiting  Musculoskeletal: Positive for myalgias  Neurological: Positive for headaches  Objective: There were no vitals filed for this visit  Physical Exam  Constitutional:       General: She is not in acute distress  Appearance: She is not ill-appearing, toxic-appearing or diaphoretic  Pulmonary:      Effort: No respiratory distress  Psychiatric:         Mood and Affect: Mood normal          Behavior: Behavior normal          VIRTUAL VISIT DISCLAIMER    Priscila Li verbally agrees to participate in [employer]   Pt is aware that [employer] could be limited without vital signs or the ability to perform a full hands-on physical [place] understands she or the provider may request at any time to terminate the video visit and request the patient to seek care or treatment in person

## 2022-06-02 ENCOUNTER — TELEPHONE (OUTPATIENT)
Dept: INTERNAL MEDICINE CLINIC | Facility: CLINIC | Age: 57
End: 2022-06-02

## 2022-06-02 NOTE — TELEPHONE ENCOUNTER
The sinus congestion is part of COVID so it will not respond to an antibiotic  Has she tried a decongestant ?  If she has, a short course of an oral steroid may help her

## 2022-06-02 NOTE — TELEPHONE ENCOUNTER
Patient states her head is like a balloon  She is still having a lot of sinus pressure  She has been using her sinus rinse and it is not helping  Patient is asking if you can call in an antibiotic for her sinuses     Pharmacy-[facility]      Please advise

## 2022-07-07 ENCOUNTER — TELEMEDICINE (OUTPATIENT)
Dept: INTERNAL MEDICINE CLINIC | Facility: CLINIC | Age: 57
End: 2022-07-07
Payer: COMMERCIAL

## 2022-07-07 VITALS — WEIGHT: 173 LBS | HEIGHT: 65 IN | BODY MASS INDEX: 28.82 KG/M2

## 2022-07-07 DIAGNOSIS — K57.92 DIVERTICULITIS: ICD-10-CM

## 2022-07-07 DIAGNOSIS — R11.0 NAUSEA: Primary | ICD-10-CM

## 2022-07-07 PROCEDURE — 99213 OFFICE O/P EST LOW 20 MIN: CPT | Performed by: NURSE PRACTITIONER

## 2022-07-07 RX ORDER — ONDANSETRON 4 MG/1
4 TABLET, ORALLY DISINTEGRATING ORAL EVERY 6 HOURS PRN
Qty: 20 TABLET | Refills: 0 | Status: SHIPPED | OUTPATIENT
Start: 2022-07-07

## 2022-07-07 RX ORDER — METRONIDAZOLE 500 MG/1
500 TABLET ORAL EVERY 8 HOURS SCHEDULED
Qty: 21 TABLET | Refills: 0 | Status: SHIPPED | OUTPATIENT
Start: 2022-07-07 | End: 2022-07-14

## 2022-07-07 RX ORDER — CIPROFLOXACIN 500 MG/1
500 TABLET, FILM COATED ORAL EVERY 12 HOURS SCHEDULED
Qty: 14 TABLET | Refills: 0 | Status: SHIPPED | OUTPATIENT
Start: 2022-07-07 | End: 2022-07-14

## 2022-07-07 NOTE — ASSESSMENT & PLAN NOTE
Symptoms are not improving start antibiotics  If having fever or chills go to the ER    Check stool for occult blood

## 2022-07-07 NOTE — PROGRESS NOTES
Virtual Regular Visit    Verification of patient location:    Patient is located in the following state in which I hold an active license [place]      Assessment/Plan:    Problem List Items Addressed This Visit        Other    Diverticulitis     Symptoms are not improving start antibiotics  If having fever or chills go to the ER  Check stool for occult blood           Relevant Medications    ciprofloxacin (CIPRO) 500 mg tablet    metroNIDAZOLE (FLAGYL) 500 mg tablet    Other Relevant Orders    Occult Blood, Fecal Immunochemical      Other Visit Diagnoses     Nausea    -  Primary    Relevant Medications    ondansetron (Zofran ODT) 4 mg disintegrating tablet               Reason for visit is   Chief Complaint   Patient presents with    Virtual Regular Visit     Began last Friday - Nausea,  black stools    Virtual Regular Visit        Encounter provider ANTOINETTE Carvajal    Provider located at [address] [place] [id number]      Recent Visits  No visits were found meeting these conditions  Showing recent visits within past 7 days and meeting all other requirements  Today's Visits  Date Type Provider Dept   07/07/22 Telemedicine Zonia Carvajal today's visits and meeting all other requirements  Future Appointments  No visits were found meeting these conditions  Showing future appointments within next 150 days and meeting all other requirements       The patient was identified by name and date of birth  Shalonda Paul was informed that this is a telemedicine visit and that the visit is being conducted through [facility] and patient was informed this is a secure, HIPAA-complaint platform  She agrees to proceed     My office door was closed  No one else was in the room  She acknowledged consent and understanding of privacy and security of the video platform   The patient has agreed to participate and understands they can discontinue the visit at any time  Patient is aware this is a billable service  Subjective  Don Garibay is a 59 y o  female    Patient is here for 2 days of Loose stool, black stool x 2, upset stomach  Pain in the epigastric area  Took Zofran at home  She has history of diverticulitis  Had a flare-up in March    She is not sure if this episode is diverticulitis it feels more like a stomach cramps/upset stomach  She sees her gastroenterologist soon       Past Medical History:   Diagnosis Date    Cervicalgia     last assessed: 10/23/2012    Fibroid 01/2015    US reveals 1 8 cm rt intramural, 4 8 pedunculated fibroid rt adnexa    Insomnia        Past Surgical History:   Procedure Laterality Date    LAPAROSCOPIC CHOLECYSTECTOMY         Current Outpatient Medications   Medication Sig Dispense Refill    aspirin (ECOTRIN LOW STRENGTH) 81 mg EC tablet Take 1 tablet (81 mg total) by mouth daily      buPROPion (WELLBUTRIN XL) 150 mg 24 hr tablet Take 1 tablet (150 mg total) by mouth daily 90 tablet 3    cetirizine (ZyrTEC) 10 mg tablet Take 10 mg by mouth daily      cholecalciferol (VITAMIN D3) 400 units tablet Take 400 Units by mouth daily      ciprofloxacin (CIPRO) 500 mg tablet Take 1 tablet (500 mg total) by mouth every 12 (twelve) hours for 7 days 14 tablet 0    Lactobacillus (PROBIOTIC ACIDOPHILUS PO) Take by mouth      levothyroxine 75 mcg tablet Take 1 tablet (75 mcg total) by mouth daily 90 tablet 3    metroNIDAZOLE (FLAGYL) 500 mg tablet Take 1 tablet (500 mg total) by mouth every 8 (eight) hours for 7 days 21 tablet 0    Multiple Vitamin (MULTIVITAMINS PO) Take 1 tablet by mouth daily      Omega-3 Fatty Acids (OMEGA-3 FISH OIL) 1200 MG CAPS Take by mouth      ondansetron (Zofran ODT) 4 mg disintegrating tablet Take 1 tablet (4 mg total) by mouth every 6 (six) hours as needed for nausea or vomiting 20 tablet 0    rosuvastatin (CRESTOR) 20 MG tablet Take 1 tablet (20 mg total) by mouth daily 90 tablet 3    vitamin E 100 UNIT capsule Take 100 Units by mouth daily       No current facility-administered medications for this visit  Allergies   Allergen Reactions    Osphena [Ospemifene] Hives    Food     Nuts - Food Allergy     Pollen Extract     Sesame Seed (Diagnostic) - Food Allergy     Sulfamethoxazole-Trimethoprim Headache    Wheat Bran - Food Allergy        Review of Systems   Constitutional: Negative  HENT: Negative  Eyes: Negative  Respiratory: Negative  Cardiovascular: Negative  Gastrointestinal: Positive for abdominal pain and diarrhea  Musculoskeletal: Negative  Neurological: Negative  Video Exam    Vitals:    07/07/22 0901   Weight: 78 5 kg (173 lb)   Height: 5' 5" (1 651 m)       Physical Exam  Vitals reviewed  Constitutional:       Appearance: Normal appearance  Neurological:      Mental Status: She is alert  I spent 15 minutes directly with the patient during this visit    VIRTUAL VISIT DISCLAIMER      Shaylee Archibald verbally agrees to participate in [employer]  Pt is aware that [employer] could be limited without vital signs or the ability to perform a full hands-on physical Altagracia Fish understands she or the provider may request at any time to terminate the video visit and request the patient to seek care or treatment in person

## 2022-07-08 ENCOUNTER — APPOINTMENT (OUTPATIENT)
Dept: LAB | Facility: HOSPITAL | Age: 57
End: 2022-07-08
Payer: COMMERCIAL

## 2022-07-08 ENCOUNTER — TELEPHONE (OUTPATIENT)
Dept: ADMINISTRATIVE | Facility: OTHER | Age: 57
End: 2022-07-08

## 2022-07-08 DIAGNOSIS — K57.92 DIVERTICULITIS: ICD-10-CM

## 2022-07-08 LAB — HEMOCCULT STL QL IA: NEGATIVE

## 2022-07-08 PROCEDURE — G0328 FECAL BLOOD SCRN IMMUNOASSAY: HCPCS

## 2022-07-08 NOTE — LETTER
Procedure Request Form: Cervical Cancer Screening      Date Requested: 22  Patient: John Lo  Patient : 1965   Referring Provider: Romario Gonsales MD        Date of Procedure _______ report_______________________       The above patient has informed us that they have completed their   most recent Cervical Cancer Screening at your facility  Please complete   this form and attach all corresponding procedure reports/results  Comments __________________________________________________________  ____________________________________________________________________  ____________________________________________________________________  ____________________________________________________________________    Facility Completing Procedure _________________________________________    Form Completed By (print name) _______________________________________      Signature __________________________________________________________      These reports are needed for  compliance  Please fax this completed form and a copy of the procedure report to our office located at [place] [address] as soon as possible to [phone] davonte Travis: Phone [phone]    We thank you for your assistance in treating our mutual patient

## 2022-07-08 NOTE — TELEPHONE ENCOUNTER
Upon review of the In Basket request and the patient's chart, initial outreach has been made via fax, please see Contacts section for details       Thank you  Luly Olivares, [address], [place] [phone] Fax: [phone]

## 2022-07-08 NOTE — LETTER
Procedure Request Form: Cervical Cancer Screening      Date Requested: 22  Patient: Zoila Sarabia  Patient : 1965   Referring Provider: Merlinda Abraham, MD     2nd Request        Date of Procedure ________ report______________________       The above patient has informed us that they have completed their   most recent Cervical Cancer Screening at your facility  Please complete   this form and attach all corresponding procedure reports/results  Comments __________________________________________________________  ____________________________________________________________________  ____________________________________________________________________  ____________________________________________________________________    Facility Completing Procedure _________________________________________    Form Completed By (print name) _______________________________________      Signature __________________________________________________________      These reports are needed for  compliance  Please fax this completed form and a copy of the procedure report to our office located at [place] [address] as soon as possible to [phone] davonte Tello Gloss: Phone [phone]    We thank you for your assistance in treating our mutual patient

## 2022-07-08 NOTE — TELEPHONE ENCOUNTER
----- Message from Victorino Kocher sent at 7/7/2022  9:07 AM EDT -----  Regarding: cervical cancer screening  07/07/22 9:07 AM    Hello, our patient attached above has had Pap Smear (HPV) aka Cervical Cancer Screening completed/performed  Please assist in updating the patient chart by making an External outreach to Dr Dilia Fairchild  facility located in [facility]  The date of service is 2021      Thank you,

## 2022-07-11 ENCOUNTER — OFFICE VISIT (OUTPATIENT)
Dept: INTERNAL MEDICINE CLINIC | Facility: CLINIC | Age: 57
End: 2022-07-11
Payer: COMMERCIAL

## 2022-07-11 VITALS
TEMPERATURE: 97.5 F | DIASTOLIC BLOOD PRESSURE: 62 MMHG | RESPIRATION RATE: 16 BRPM | SYSTOLIC BLOOD PRESSURE: 124 MMHG | HEIGHT: 65 IN | WEIGHT: 171.2 LBS | HEART RATE: 72 BPM | BODY MASS INDEX: 28.52 KG/M2 | OXYGEN SATURATION: 98 %

## 2022-07-11 DIAGNOSIS — E78.00 HYPERCHOLESTEROLEMIA: ICD-10-CM

## 2022-07-11 DIAGNOSIS — F33.1 MODERATE EPISODE OF RECURRENT MAJOR DEPRESSIVE DISORDER (HCC): ICD-10-CM

## 2022-07-11 DIAGNOSIS — K57.92 DIVERTICULITIS: ICD-10-CM

## 2022-07-11 DIAGNOSIS — E03.9 ACQUIRED HYPOTHYROIDISM: ICD-10-CM

## 2022-07-11 DIAGNOSIS — Z00.00 WELLNESS EXAMINATION: Primary | ICD-10-CM

## 2022-07-11 DIAGNOSIS — G47.33 OBSTRUCTIVE SLEEP APNEA: ICD-10-CM

## 2022-07-11 PROCEDURE — 99396 PREV VISIT EST AGE 40-64: CPT | Performed by: INTERNAL MEDICINE

## 2022-07-11 NOTE — PROGRESS NOTES
Assessment/Plan:    Obstructive sleep apnea  Mild, oral device from [address] stopped snoring    Moderate episode of recurrent major depressive disorder ([facility] [address] )  Doing well on Wellbutrin    Diverticulitis  Recurrent diverticulitis this time starting with nausea but with LLQ discomfort  No fever, guarding, rebound tenderness on exam  She is able to eat and drink  Start antibiotic at this point  F/U with Gi as scheduled  May need repeat colonoscopy , discuss possible surgery       Problem List Items Addressed This Visit        Endocrine    Hypothyroidism    Relevant Orders    CBC and Platelet    Comprehensive metabolic panel    TSH, 3rd generation with Free T4 reflex       Respiratory    Obstructive sleep apnea     Mild, oral device from [address] stopped snoring              Other    Hypercholesterolemia    Relevant Orders    CBC and Platelet    Comprehensive metabolic panel    Lipid Panel with Direct LDL reflex    Moderate episode of recurrent major depressive disorder ([facility] [address] )     Doing well on Wellbutrin           Diverticulitis     Recurrent diverticulitis this time starting with nausea but with LLQ discomfort  No fever, guarding, rebound tenderness on exam  She is able to eat and drink  Start antibiotic at this point  F/U with Gi as scheduled  May need repeat colonoscopy , discuss possible surgery               Other Visit Diagnoses     Wellness examination    -  Primary            Subjective:      Patient ID: Griselda Garvin is a 59 y o  female      HPI  Started with nausea July 1, no vomiting  Bloated, no fever, chills  Discomfort in the LLQ followed with small caliber dark stools   Stool hemoccult negative  Colonoscopy in 2020  Diverticulitis in Feb 2021 and 4 episodes since  She has not started the antibiotics for this episode  Appt with GI in a few days  Up to date with lipids, DM screening  Up to date with breast cancer screening  Due for pap smear    The following portions of the patient's history were reviewed and updated as appropriate: allergies, current medications, past family history, past medical history, past social history, past surgical history and problem list     Review of Systems   Constitutional: Negative for chills, fever and unexpected weight change  Respiratory: Negative for shortness of breath  Cardiovascular: Negative for chest pain and palpitations  Gastrointestinal: Positive for abdominal pain and nausea  Negative for constipation  Genitourinary: Negative for difficulty urinating  Psychiatric/Behavioral: Negative for dysphoric mood  Objective:      /62   Pulse 72   Temp 97 5 °F (36 4 °C)   Resp 16   Ht 5' 5" (1 651 m)   Wt 77 7 kg (171 lb 3 2 oz)   SpO2 98%   BMI 28 49 kg/m²          Physical Exam  Constitutional:       General: She is not in acute distress  Appearance: She is well-developed  She is not ill-appearing, toxic-appearing or diaphoretic  HENT:      Head: Normocephalic and atraumatic  Eyes:      Conjunctiva/sclera: Conjunctivae normal    Cardiovascular:      Rate and Rhythm: Normal rate and regular rhythm  Heart sounds: Normal heart sounds  No murmur heard  Pulmonary:      Effort: Pulmonary effort is normal  No respiratory distress  Breath sounds: Normal breath sounds  No wheezing or rales  Abdominal:      General: There is no distension  Palpations: Abdomen is soft  There is no mass  Tenderness: There is abdominal tenderness in the left lower quadrant  There is no guarding or rebound  Musculoskeletal:      Cervical back: Neck supple  Right lower leg: No edema  Left lower leg: No edema  Skin:     General: Skin is warm and dry  Neurological:      Mental Status: She is alert and oriented to person, place, and time  Psychiatric:         Mood and Affect: Mood normal          Behavior: Behavior normal          Thought Content:  Thought content normal          Judgment: Judgment normal

## 2022-07-12 NOTE — ASSESSMENT & PLAN NOTE
Recurrent diverticulitis this time starting with nausea but with LLQ discomfort  No fever, guarding, rebound tenderness on exam  She is able to eat and drink  Start antibiotic at this point  F/U with Gi as scheduled  May need repeat colonoscopy , discuss possible surgery

## 2022-07-12 NOTE — TELEPHONE ENCOUNTER
As a follow-up, a second attempt has been made for outreach via fax, please see Contacts section for details      Thank you  Kristan [place], [address], [address] [facility] [phone] Fax: [phone]

## 2022-07-14 ENCOUNTER — OFFICE VISIT (OUTPATIENT)
Dept: GASTROENTEROLOGY | Facility: CLINIC | Age: 57
End: 2022-07-14
Payer: COMMERCIAL

## 2022-07-14 VITALS
WEIGHT: 173 LBS | HEART RATE: 68 BPM | OXYGEN SATURATION: 99 % | TEMPERATURE: 97.4 F | DIASTOLIC BLOOD PRESSURE: 70 MMHG | BODY MASS INDEX: 28.82 KG/M2 | HEIGHT: 65 IN | SYSTOLIC BLOOD PRESSURE: 122 MMHG

## 2022-07-14 DIAGNOSIS — K57.92 DIVERTICULITIS: Primary | ICD-10-CM

## 2022-07-14 DIAGNOSIS — Z86.010 PERSONAL HISTORY OF COLONIC POLYPS: ICD-10-CM

## 2022-07-14 PROCEDURE — 99213 OFFICE O/P EST LOW 20 MIN: CPT | Performed by: PHYSICIAN ASSISTANT

## 2022-07-14 NOTE — LETTER
July 14, 2022     Kirsten Sharp, [address] [facility] [address]    Patient: Jhonathan Castillo   YOB: 1965   Date of Visit: 7/14/2022       Dear Dr Tianna Springer: Thank you for referring Jhonathan Castillo to me for evaluation  Below are my notes for this consultation  If you have questions, please do not hesitate to call me  I look forward to following your patient along with you  Sincerely,        Angel Long PA-C        CC: No Recipients  Angel Long PA-C  7/14/2022 12:45 PM  Sign when Signing Visit  [facility] - Outpatient Follow-up Note  Jhonathan Castillo 59 y o  female MRN: [id number]  Encounter: [id number]          ASSESSMENT AND PLAN:      1  Diverticulitis  She reports at least 4 episodes of diverticulitis since February 2021  This was confirmed on CT scan with her initial episode, but subsequent episodes she was treated empirically with oral Cipro and Flagyl  She is currently completing course of antibiotics  Overall, her pain is improved and her abdomen is only mildly tender to palpation  We recommend colonoscopy in 6-8 weeks to evaluate for any additional polyps  She will continue to stay hydrated and use Zofran and MiraLax as needed for nausea and constipation  If her pain worsens, I instructed her to contact the office  If she continues to have recurrent episodes, she may need surgical evaluation     - Colonoscopy; Future    2  Personal history of colonic polyps  She had single 4 mm sessile adenomatous appearing polyp removed from ascending colon 2 years ago  The polyp could not be retrieved  She was recommended repeat colonoscopy in 5 years, however given her recent diverticulitis episodes, we will plan for colonoscopy sooner  Follow-up in 4 months  ______________________________________________________________________    SUBJECTIVE:  67-year-old female with hypothyroidism presenting for evaluation of recurrent diverticulitis   Her 1st episode of diverticulitis was in February 2021  This was confirmed on CT scan  CT showed sigmoid diverticulosis with wall thickening and anna sigmoid inflammation consistent with acute diverticulitis  There was no perforation or abscess  She was treated with Cipro and Flagyl  Her symptoms recurred in January and March and she was empirically treated again with Cipro and Flagyl  She had recurrence of pain and nausea 2 weeks ago and eventually saw her PCP who prescribed more antibiotics  She began another course of Cipro and Flagyl on Monday  Overall, her left lower quadrant cramping is better  Using a heating pad helps  She has been drinking liquids like Pedialyte, Gatorade, water and staying hydrated  She is limiting her fiber right now, although typically she does eat high-fiber diet  Her stools are [place] type 1 and difficult to pass but typically she has type 4 stools  She denies any blood in the stool  REVIEW OF SYSTEMS IS OTHERWISE NEGATIVE        Historical Information   Past Medical History:   Diagnosis Date    Cervicalgia     last assessed: 10/23/2012    Fibroid 01/2015    US reveals 1 8 cm rt intramural, 4 8 pedunculated fibroid rt adnexa    Insomnia      Past Surgical History:   Procedure Laterality Date    LAPAROSCOPIC CHOLECYSTECTOMY       Social History   Social History     Substance and Sexual Activity   Alcohol Use Yes    Alcohol/week: 4 0 standard drinks    Types: 4 Standard drinks or equivalent per week    Comment: social     Social History     Substance and Sexual Activity   Drug Use No     Social History     Tobacco Use   Smoking Status Never Smoker   Smokeless Tobacco Never Used     Family History   Problem Relation Age of Onset    Stroke Mother     Stroke Father     Stroke Brother     Diabetes Family     Hyperlipidemia Family     Hypertension Family     Breast cancer Cousin 48    No Known Problems Maternal Aunt     No Known Problems Maternal Aunt     No Known Problems Maternal Aunt  No Known Problems Maternal Aunt     No Known Problems Paternal Aunt     Dementia Sister     Uterine cancer Cousin         68       Meds/Allergies       Current Outpatient Medications:     aspirin (ECOTRIN LOW STRENGTH) 81 mg EC tablet    buPROPion (WELLBUTRIN XL) 150 mg 24 hr tablet    cetirizine (ZyrTEC) 10 mg tablet    cholecalciferol (VITAMIN D3) 400 units tablet    ciprofloxacin (CIPRO) 500 mg tablet    Lactobacillus (PROBIOTIC ACIDOPHILUS PO)    levothyroxine 75 mcg tablet    metroNIDAZOLE (FLAGYL) 500 mg tablet    Multiple Vitamin (MULTIVITAMINS PO)    Omega-3 Fatty Acids (OMEGA-3 FISH OIL) 1200 MG CAPS    ondansetron (Zofran ODT) 4 mg disintegrating tablet    rosuvastatin (CRESTOR) 20 MG tablet    vitamin E 100 UNIT capsule    Allergies   Allergen Reactions    Osphena [Ospemifene] Hives    Food     Nuts - Food Allergy     Pollen Extract     Sesame Seed (Diagnostic) - Food Allergy     Sulfamethoxazole-Trimethoprim Headache    Wheat Bran - Food Allergy            Objective     Blood pressure 122/70, pulse 68, temperature (!) 97 4 °F (36 3 °C), temperature source Tympanic, height 5' 5" (1 651 m), weight 78 5 kg (173 lb), SpO2 99 %, not currently breastfeeding  Body mass index is 28 79 kg/m²  PHYSICAL EXAM:      General Appearance:   Alert, cooperative, no distress   HEENT:   Normocephalic, atraumatic, anicteric      Neck:  Supple, symmetrical, trachea midline   Lungs:   Clear to auscultation bilaterally; no rales, rhonchi or wheezing; respirations unlabored    Heart[de-identified]   Regular rate and rhythm; no murmur, rub, or gallop     Abdomen:   Soft, non-tender, non-distended; normal bowel sounds; no masses, no organomegaly    Genitalia:   Deferred    Rectal:   Deferred    Extremities:  No cyanosis, clubbing or edema    Pulses:  2+ and symmetric    Skin:  No jaundice, rashes, or lesions    Lymph nodes:  No palpable cervical lymphadenopathy        Lab Results:   No visits with results within 1 Day(s) from this visit  Latest known visit with results is:   Appointment on 07/08/2022   Component Date Value    OCCULT BLD, FECAL IMMUNO* 07/08/2022 Negative          Radiology Results:   No results found

## 2022-07-14 NOTE — PROGRESS NOTES
[facility] - Outpatient Follow-up Note  John Allison 59 y o  female MRN: [id number]  Encounter: [id number]          ASSESSMENT AND PLAN:      1  Diverticulitis  She reports at least 4 episodes of diverticulitis since February 2021  This was confirmed on CT scan with her initial episode, but subsequent episodes she was treated empirically with oral Cipro and Flagyl  She is currently completing course of antibiotics  Overall, her pain is improved and her abdomen is only mildly tender to palpation  We recommend colonoscopy in 6-8 weeks to evaluate for any additional polyps  She will continue to stay hydrated and use Zofran and MiraLax as needed for nausea and constipation  If her pain worsens, I instructed her to contact the office  If she continues to have recurrent episodes, she may need surgical evaluation     - Colonoscopy; Future    2  Personal history of colonic polyps  She had single 4 mm sessile adenomatous appearing polyp removed from ascending colon 2 years ago  The polyp could not be retrieved  She was recommended repeat colonoscopy in 5 years, however given her recent diverticulitis episodes, we will plan for colonoscopy sooner  Follow-up in 4 months  ______________________________________________________________________    SUBJECTIVE:  25-year-old female with hypothyroidism presenting for evaluation of recurrent diverticulitis  Her 1st episode of diverticulitis was in February 2021  This was confirmed on CT scan  CT showed sigmoid diverticulosis with wall thickening and anna sigmoid inflammation consistent with acute diverticulitis  There was no perforation or abscess  She was treated with Cipro and Flagyl  Her symptoms recurred in January and March and she was empirically treated again with Cipro and Flagyl  She had recurrence of pain and nausea 2 weeks ago and eventually saw her PCP who prescribed more antibiotics  She began another course of Cipro and Flagyl on Monday  Overall, her left lower quadrant cramping is better  Using a heating pad helps  She has been drinking liquids like Pedialyte, Gatorade, water and staying hydrated  She is limiting her fiber right now, although typically she does eat high-fiber diet  Her stools are [place] type 1 and difficult to pass but typically she has type 4 stools  She denies any blood in the stool  REVIEW OF SYSTEMS IS OTHERWISE NEGATIVE        Historical Information   Past Medical History:   Diagnosis Date    Cervicalgia     last assessed: 10/23/2012    Fibroid 01/2015    US reveals 1 8 cm rt intramural, 4 8 pedunculated fibroid rt adnexa    Insomnia      Past Surgical History:   Procedure Laterality Date    LAPAROSCOPIC CHOLECYSTECTOMY       Social History   Social History     Substance and Sexual Activity   Alcohol Use Yes    Alcohol/week: 4 0 standard drinks    Types: 4 Standard drinks or equivalent per week    Comment: social     Social History     Substance and Sexual Activity   Drug Use No     Social History     Tobacco Use   Smoking Status Never Smoker   Smokeless Tobacco Never Used     Family History   Problem Relation Age of Onset    Stroke Mother     Stroke Father     Stroke Brother     Diabetes Family     Hyperlipidemia Family     Hypertension Family     Breast cancer Cousin 48    No Known Problems Maternal Aunt     No Known Problems Maternal Aunt     No Known Problems Maternal Aunt     No Known Problems Maternal Aunt     No Known Problems Paternal Aunt     Dementia Sister     Uterine cancer Cousin         68       Meds/Allergies       Current Outpatient Medications:     aspirin (ECOTRIN LOW STRENGTH) 81 mg EC tablet    buPROPion (WELLBUTRIN XL) 150 mg 24 hr tablet    cetirizine (ZyrTEC) 10 mg tablet    cholecalciferol (VITAMIN D3) 400 units tablet    ciprofloxacin (CIPRO) 500 mg tablet    Lactobacillus (PROBIOTIC ACIDOPHILUS PO)    levothyroxine 75 mcg tablet    metroNIDAZOLE (FLAGYL) 500 mg tablet   Multiple Vitamin (MULTIVITAMINS PO)    Omega-3 Fatty Acids (OMEGA-3 FISH OIL) 1200 MG CAPS    ondansetron (Zofran ODT) 4 mg disintegrating tablet    rosuvastatin (CRESTOR) 20 MG tablet    vitamin E 100 UNIT capsule    Allergies   Allergen Reactions    Osphena [Ospemifene] Hives    Food     Nuts - Food Allergy     Pollen Extract     Sesame Seed (Diagnostic) - Food Allergy     Sulfamethoxazole-Trimethoprim Headache    Wheat Bran - Food Allergy            Objective     Blood pressure 122/70, pulse 68, temperature (!) 97 4 °F (36 3 °C), temperature source Tympanic, height 5' 5" (1 651 m), weight 78 5 kg (173 lb), SpO2 99 %, not currently breastfeeding  Body mass index is 28 79 kg/m²  PHYSICAL EXAM:      General Appearance:   Alert, cooperative, no distress   HEENT:   Normocephalic, atraumatic, anicteric      Neck:  Supple, symmetrical, trachea midline   Lungs:   Clear to auscultation bilaterally; no rales, rhonchi or wheezing; respirations unlabored    Heart[de-identified]   Regular rate and rhythm; no murmur, rub, or gallop  Abdomen:   Soft, non-tender, non-distended; normal bowel sounds; no masses, no organomegaly    Genitalia:   Deferred    Rectal:   Deferred    Extremities:  No cyanosis, clubbing or edema    Pulses:  2+ and symmetric    Skin:  No jaundice, rashes, or lesions    Lymph nodes:  No palpable cervical lymphadenopathy        Lab Results:   No visits with results within 1 Day(s) from this visit  Latest known visit with results is:   Appointment on 07/08/2022   Component Date Value    OCCULT BLD, FECAL IMMUNO* 07/08/2022 Negative          Radiology Results:   No results found

## 2022-07-14 NOTE — PATIENT INSTRUCTIONS
Scheduled date of colonoscopy (as of today): 09/26/2022  Physician performing colonoscopy: Dr Mark Joyce  Location of colonoscopy: [facility]  Bowel prep reviewed with patient: Miralax/Magnesium Citrate  Instructions reviewed with patient by: EDELMIRA  Clearances:  N/A

## 2022-07-15 NOTE — TELEPHONE ENCOUNTER
Upon review of the In Basket request we have found as a result of outreach that patient did not have the requested item(s) completed  Spoke to Jack Phipps, practice admin for [email or web address]  She informed me that last pap was 2020 which is already on file  She reminded me that [facility] is part of our Epic and all notes/labs are visible  Any additional questions or concerns should be emailed to the Practice Liaisons via [email or web address] email, please do not reply via In Basket      Thank you  Wilton Goodwin

## 2022-09-12 ENCOUNTER — ANESTHESIA (OUTPATIENT)
Dept: ANESTHESIOLOGY | Facility: HOSPITAL | Age: 57
End: 2022-09-12

## 2022-09-12 ENCOUNTER — ANESTHESIA EVENT (OUTPATIENT)
Dept: ANESTHESIOLOGY | Facility: HOSPITAL | Age: 57
End: 2022-09-12

## 2022-09-14 ENCOUNTER — NURSE TRIAGE (OUTPATIENT)
Dept: OTHER | Facility: OTHER | Age: 57
End: 2022-09-14

## 2022-09-14 NOTE — TELEPHONE ENCOUNTER
Regarding: colonoscopy prep questions  ----- Message from Carlos Ortiz sent at 9/14/2022 12:50 PM EDT -----  "I went to the pharmacy to get my colonoscopy prep kit and was told that it has been recalled   I don't know what else I can take "

## 2022-09-14 NOTE — TELEPHONE ENCOUNTER
Patient advised to replace Magnesium Citrate with Dulcolax tablets and verbalized understanding       Reason for Disposition   Bowel prep for colonoscopy, questions about    Protocols used: COLONOSCOPY SYMPTOMS AND QUESTIONS-ADULT-JACQUE

## 2022-09-19 ENCOUNTER — TELEPHONE (OUTPATIENT)
Dept: INTERNAL MEDICINE CLINIC | Facility: CLINIC | Age: 57
End: 2022-09-19

## 2022-09-19 NOTE — TELEPHONE ENCOUNTER
The patient needs to have intermittent  FMLA forms filled out for her diverticulitis  Did you want to see her or can she just fax the forms to us? You only have same days left

## 2022-09-25 DIAGNOSIS — E78.00 HYPERCHOLESTEROLEMIA: ICD-10-CM

## 2022-09-25 RX ORDER — ROSUVASTATIN CALCIUM 20 MG/1
TABLET, COATED ORAL
Qty: 90 TABLET | Refills: 0 | Status: SHIPPED | OUTPATIENT
Start: 2022-09-25

## 2022-09-26 ENCOUNTER — ANESTHESIA (OUTPATIENT)
Dept: GASTROENTEROLOGY | Facility: AMBULARY SURGERY CENTER | Age: 57
End: 2022-09-26

## 2022-09-26 ENCOUNTER — ANESTHESIA EVENT (OUTPATIENT)
Dept: GASTROENTEROLOGY | Facility: AMBULARY SURGERY CENTER | Age: 57
End: 2022-09-26

## 2022-09-26 ENCOUNTER — HOSPITAL ENCOUNTER (OUTPATIENT)
Dept: GASTROENTEROLOGY | Facility: AMBULARY SURGERY CENTER | Age: 57
Setting detail: OUTPATIENT SURGERY
Discharge: HOME | End: 2022-09-26
Payer: COMMERCIAL

## 2022-09-26 VITALS
RESPIRATION RATE: 20 BRPM | SYSTOLIC BLOOD PRESSURE: 119 MMHG | HEART RATE: 60 BPM | HEIGHT: 65 IN | OXYGEN SATURATION: 99 % | TEMPERATURE: 97.3 F | BODY MASS INDEX: 27.66 KG/M2 | DIASTOLIC BLOOD PRESSURE: 71 MMHG | WEIGHT: 166 LBS

## 2022-09-26 DIAGNOSIS — K57.92 DIVERTICULITIS: ICD-10-CM

## 2022-09-26 PROCEDURE — 45378 DIAGNOSTIC COLONOSCOPY: CPT | Performed by: INTERNAL MEDICINE

## 2022-09-26 RX ORDER — LIDOCAINE HYDROCHLORIDE 10 MG/ML
INJECTION, SOLUTION EPIDURAL; INFILTRATION; INTRACAUDAL; PERINEURAL AS NEEDED
Status: DISCONTINUED | OUTPATIENT
Start: 2022-09-26 | End: 2022-09-26

## 2022-09-26 RX ORDER — SODIUM CHLORIDE, SODIUM LACTATE, POTASSIUM CHLORIDE, CALCIUM CHLORIDE 600; 310; 30; 20 MG/100ML; MG/100ML; MG/100ML; MG/100ML
125 INJECTION, SOLUTION INTRAVENOUS CONTINUOUS
Status: CANCELLED | OUTPATIENT
Start: 2022-09-26

## 2022-09-26 RX ORDER — PROPOFOL 10 MG/ML
INJECTION, EMULSION INTRAVENOUS AS NEEDED
Status: DISCONTINUED | OUTPATIENT
Start: 2022-09-26 | End: 2022-09-26

## 2022-09-26 RX ORDER — SODIUM CHLORIDE, SODIUM LACTATE, POTASSIUM CHLORIDE, CALCIUM CHLORIDE 600; 310; 30; 20 MG/100ML; MG/100ML; MG/100ML; MG/100ML
INJECTION, SOLUTION INTRAVENOUS CONTINUOUS PRN
Status: DISCONTINUED | OUTPATIENT
Start: 2022-09-26 | End: 2022-09-26

## 2022-09-26 RX ADMIN — LIDOCAINE HYDROCHLORIDE 50 MG: 10 INJECTION, SOLUTION EPIDURAL; INFILTRATION; INTRACAUDAL; PERINEURAL at 11:42

## 2022-09-26 RX ADMIN — PROPOFOL 30 MG: 10 INJECTION, EMULSION INTRAVENOUS at 11:47

## 2022-09-26 RX ADMIN — SODIUM CHLORIDE, SODIUM LACTATE, POTASSIUM CHLORIDE, AND CALCIUM CHLORIDE: .6; .31; .03; .02 INJECTION, SOLUTION INTRAVENOUS at 11:28

## 2022-09-26 RX ADMIN — PROPOFOL 20 MG: 10 INJECTION, EMULSION INTRAVENOUS at 11:45

## 2022-09-26 RX ADMIN — PROPOFOL 150 MG: 10 INJECTION, EMULSION INTRAVENOUS at 11:42

## 2022-09-26 RX ADMIN — PROPOFOL 20 MG: 10 INJECTION, EMULSION INTRAVENOUS at 11:49

## 2022-09-26 RX ADMIN — PROPOFOL 30 MG: 10 INJECTION, EMULSION INTRAVENOUS at 11:52

## 2022-09-26 NOTE — ANESTHESIA POSTPROCEDURE EVALUATION
Post-Op Assessment Note    CV Status:  Stable  Pain Score: 0    Pain management: adequate     Mental Status:  Awake and alert   Hydration Status:  Stable   PONV Controlled:  None   Airway Patency:  Patent and adequate      Post Op Vitals Reviewed: Yes      Staff: CRNA, Anesthesiologist         No complications documented      BP   125/67   Temp     Pulse  66   Resp   16   SpO2   99%

## 2022-09-26 NOTE — H&P
History and Physical - [facility]  Cami Olmstead 59 y o  female MRN: [id number]                  HPI: Elvia Stiles is a 59y o  year old female who presents for colonoscopy for history of diverticulitis and colon polyps      REVIEW OF SYSTEMS: Per the HPI, and otherwise unremarkable      Historical Information   Past Medical History:   Diagnosis Date    Cervicalgia     last assessed: 10/23/2012    Colon polyp     Diverticula of colon     Fibroid 01/2015    US reveals 1 8 cm rt intramural, 4 8 pedunculated fibroid rt adnexa    Insomnia      Past Surgical History:   Procedure Laterality Date    COLONOSCOPY      LAPAROSCOPIC CHOLECYSTECTOMY       Social History   Social History     Substance and Sexual Activity   Alcohol Use Yes    Alcohol/week: 4 0 standard drinks    Types: 4 Standard drinks or equivalent per week    Comment: social     Social History     Substance and Sexual Activity   Drug Use No     Social History     Tobacco Use   Smoking Status Never Smoker   Smokeless Tobacco Never Used     Family History   Problem Relation Age of Onset    Stroke Mother     Stroke Father     Stroke Brother     Diabetes Family     Hyperlipidemia Family     Hypertension Family     Breast cancer Cousin 48    No Known Problems Maternal Aunt     No Known Problems Maternal Aunt     No Known Problems Maternal Aunt     No Known Problems Maternal Aunt     No Known Problems Paternal Aunt     Dementia Sister     Uterine cancer Cousin         68       Meds/Allergies       Current Outpatient Medications:     aspirin (ECOTRIN LOW STRENGTH) 81 mg EC tablet    buPROPion (WELLBUTRIN XL) 150 mg 24 hr tablet    cetirizine (ZyrTEC) 10 mg tablet    cholecalciferol (VITAMIN D3) 400 units tablet    Lactobacillus (PROBIOTIC ACIDOPHILUS PO)    levothyroxine 75 mcg tablet    Multiple Vitamin (MULTIVITAMINS PO)    Omega-3 Fatty Acids (OMEGA-3 FISH OIL) 1200 MG CAPS    rosuvastatin (CRESTOR) 20 MG tablet    vitamin E 100 UNIT capsule    ondansetron (Zofran ODT) 4 mg disintegrating tablet    Allergies   Allergen Reactions    Osphena [Ospemifene] Hives    Food     Nuts - Food Allergy     Pollen Extract     Sesame Seed (Diagnostic) - Food Allergy     Sulfamethoxazole-Trimethoprim Headache    Wheat Bran - Food Allergy        Objective     /80   Pulse 73 Comment: sr  Temp (!) 96 9 °F (36 1 °C) (Temporal)   Resp 18   Ht 5' 5" (1 651 m)   Wt 75 3 kg (166 lb)   SpO2 97%   BMI 27 62 kg/m²       PHYSICAL EXAM    Gen: NAD  Head: NCAT  CV: RRR  CHEST: Clear  ABD: soft, NT/ND  EXT: no edema      ASSESSMENT/PLAN:  This is a 59y o  year old female here for colonoscopy, and she is stable and optimized for her procedure

## 2022-09-26 NOTE — ANESTHESIA PREPROCEDURE EVALUATION
Procedure:  COLONOSCOPY    Relevant Problems   CARDIO   (+) Hypercholesterolemia      ENDO   (+) Hypothyroidism      NEURO/PSYCH   (+) Moderate episode of recurrent major depressive disorder (HCC)      PULMONARY   (+) Obstructive sleep apnea        Physical Exam    Airway    Mallampati score: I  TM Distance: >3 FB  Neck ROM: full     Dental   No notable dental hx     Cardiovascular      Pulmonary      Other Findings        Anesthesia Plan  ASA Score- 2     Anesthesia Type-     Plan Factors-Exercise tolerance (METS): >4 METS  Chart reviewed  Patient summary reviewed  Patient is not a current smoker  Induction- intravenous  Postoperative Plan-     Informed Consent- Anesthetic plan and risks discussed with patient  I personally reviewed this patient with the CRNA  Discussed and agreed on the Anesthesia Plan with the CRNA  Dara Soulier

## 2022-11-21 ENCOUNTER — NURSE TRIAGE (OUTPATIENT)
Dept: OTHER | Facility: OTHER | Age: 57
End: 2022-11-21

## 2022-11-21 DIAGNOSIS — F41.9 ANXIETY: Primary | ICD-10-CM

## 2022-11-21 RX ORDER — ALPRAZOLAM 0.25 MG/1
0.25 TABLET ORAL DAILY PRN
Qty: 10 TABLET | Refills: 0 | Status: SHIPPED | OUTPATIENT
Start: 2022-11-21 | End: 2023-07-18 | Stop reason: SDUPTHER

## 2022-11-21 NOTE — TELEPHONE ENCOUNTER
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 and stress has increased with the hospice notice that sibling is in the final stages  Patient does currently take Wellbutrin  mg 24 hour tablet for depression  Please follow up with patient for medication request for anxiety  Patient also requests a work note; did not specify for length of time  Reason for Disposition  • Symptoms interfere with sleep    Answer Assessment - Initial Assessment Questions  1  CONCERN: "What happened that made you call today?"      Request medication for anxiety while being caregiver for sibling who is on hospice    2  ANXIETY SYMPTOM SCREENING: "Can you describe how you have been feeling?"  (e g , tense, restless, panicky, anxious, keyed up, trouble sleeping, trouble concentrating)     Crying, anxiety, trouble sleeping even though she takes melatonin    3  ONSET: "How long have you been feeling this way?"      Post seizure of hospice patient two weeks ago; daily assist after working 8 hour day in the evening    4  RECURRENT: "Have you felt this way before?"  If Yes, ask: "What happened that time?" "What helped these feelings go away in the past?"       Yes, when parents passed away    5  RISK OF HARM - SUICIDAL IDEATION:  "Do you ever have thoughts of hurting or killing yourself?"  (e g , yes, no, no but preoccupation with thoughts about death)    - INTENT:  "Do you have thoughts of hurting or killing yourself right NOW?" (e g , yes, no, N/A)    - PLAN: "Do you have a specific plan for how you would do this?" (e g , gun, knife, overdose, no plan, N/A)      Denies    6   RISK OF HARM - HOMICIDAL IDEATION:  "Do you ever have thoughts of hurting or killing someone else?"  (e g , yes, no, no but preoccupation with thoughts about death)    - INTENT:  "Do you have thoughts of hurting or killing someone right NOW?" (e g , yes, no, N/A)    - PLAN: "Do you have a specific plan for how you would do this?" (e g , gun, knife, no plan, N/A)       Denies    7  FUNCTIONAL IMPAIRMENT: "How have things been going for you overall? Have you had more difficulty than usual doing your normal daily activities?"  (e g , better, same, worse; self-care, school, work, interactions)      Denies    8  SUPPORT: "Who is with you now?" "Who do you live with?" "Do you have family or friends who you can talk to?"       Family and friend support    9  THERAPIST: "Do you have a counselor or therapist? Name?"      Denies    10  STRESSORS: "Has there been any new stress or recent changes in your life?"        Sibling   6  CAFFEINE USE: "Do you drink caffeinated beverages, and how much each day?" (e g , coffee, tea, shante)        Coffee in AM    12  ALCOHOL USE OR SUBSTANCE USE (DRUG USE): "Do you drink alcohol or use any illegal drugs?"       Denies    13  OTHER SYMPTOMS: "Do you have any other physical symptoms right now?" (e g , chest pain, palpitations, difficulty breathing, fever)        Not breathing difficulty but anxiety    14   PREGNANCY: "Is there any chance you are pregnant?" "When was your last menstrual period?"        Denies    Protocols used: ANXIETY AND PANIC ATTACK-ADULT-[place]

## 2022-11-21 NOTE — TELEPHONE ENCOUNTER
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

## 2022-11-21 NOTE — TELEPHONE ENCOUNTER
Advised pt, she isn't sure what she needs as far as the work note, she just wants to have something to cover herself, hospice said maybe 3 days for her sister so if she passes on Thursday she doesn't plan on going to work Saturday    she's not sure what can be done

## 2022-11-21 NOTE — TELEPHONE ENCOUNTER
Colt Parker is when everything went downhill, sister not waking up, pt not working today (had requested it off) and boss gave her off the rest of the week, she is just requesting as a precaution

## 2022-12-08 DIAGNOSIS — E03.9 HYPOTHYROIDISM, UNSPECIFIED TYPE: ICD-10-CM

## 2022-12-08 RX ORDER — LEVOTHYROXINE SODIUM 0.07 MG/1
TABLET ORAL
Qty: 90 TABLET | Refills: 0 | Status: SHIPPED | OUTPATIENT
Start: 2022-12-08

## 2023-01-08 DIAGNOSIS — F33.1 MODERATE EPISODE OF RECURRENT MAJOR DEPRESSIVE DISORDER (HCC): ICD-10-CM

## 2023-01-09 ENCOUNTER — APPOINTMENT (OUTPATIENT)
Dept: LAB | Facility: CLINIC | Age: 58
End: 2023-01-09

## 2023-01-09 DIAGNOSIS — E03.9 ACQUIRED HYPOTHYROIDISM: ICD-10-CM

## 2023-01-09 DIAGNOSIS — E78.00 HYPERCHOLESTEROLEMIA: ICD-10-CM

## 2023-01-09 LAB
ALBUMIN SERPL BCP-MCNC: 4.1 G/DL (ref 3.5–5)
ALP SERPL-CCNC: 69 U/L (ref 46–116)
ALT SERPL W P-5'-P-CCNC: 32 U/L (ref 12–78)
ANION GAP SERPL CALCULATED.3IONS-SCNC: 6 MMOL/L (ref 4–13)
AST SERPL W P-5'-P-CCNC: 18 U/L (ref 5–45)
BILIRUB SERPL-MCNC: 0.39 MG/DL (ref 0.2–1)
BUN SERPL-MCNC: 16 MG/DL (ref 5–25)
CALCIUM SERPL-MCNC: 8.9 MG/DL (ref 8.3–10.1)
CHLORIDE SERPL-SCNC: 107 MMOL/L (ref 96–108)
CHOLEST SERPL-MCNC: 182 MG/DL
CO2 SERPL-SCNC: 26 MMOL/L (ref 21–32)
CREAT SERPL-MCNC: 0.74 MG/DL (ref 0.6–1.3)
ERYTHROCYTE [DISTWIDTH] IN BLOOD BY AUTOMATED COUNT: 13.6 % (ref 11.6–15.1)
GFR SERPL CREATININE-BSD FRML MDRD: 88 ML/MIN/1.73SQ M
GLUCOSE P FAST SERPL-MCNC: 119 MG/DL (ref 65–99)
HCT VFR BLD AUTO: 37.1 % (ref 34.8–46.1)
HDLC SERPL-MCNC: 62 MG/DL
HGB BLD-MCNC: 11.9 G/DL (ref 11.5–15.4)
LDLC SERPL CALC-MCNC: 89 MG/DL (ref 0–100)
MCH RBC QN AUTO: 28.3 PG (ref 26.8–34.3)
MCHC RBC AUTO-ENTMCNC: 32.1 G/DL (ref 31.4–37.4)
MCV RBC AUTO: 88 FL (ref 82–98)
PLATELET # BLD AUTO: 307 THOUSANDS/UL (ref 149–390)
PMV BLD AUTO: 10.5 FL (ref 8.9–12.7)
POTASSIUM SERPL-SCNC: 4.3 MMOL/L (ref 3.5–5.3)
PROT SERPL-MCNC: 7.5 G/DL (ref 6.4–8.4)
RBC # BLD AUTO: 4.2 MILLION/UL (ref 3.81–5.12)
SODIUM SERPL-SCNC: 139 MMOL/L (ref 135–147)
TRIGL SERPL-MCNC: 155 MG/DL
TSH SERPL DL<=0.05 MIU/L-ACNC: 1.13 UIU/ML (ref 0.45–4.5)
WBC # BLD AUTO: 5.71 THOUSAND/UL (ref 4.31–10.16)

## 2023-01-09 RX ORDER — BUPROPION HYDROCHLORIDE 150 MG/1
TABLET ORAL
Qty: 90 TABLET | Refills: 0 | Status: SHIPPED | OUTPATIENT
Start: 2023-01-09

## 2023-01-10 ENCOUNTER — ANNUAL EXAM (OUTPATIENT)
Dept: OBGYN CLINIC | Facility: CLINIC | Age: 58
End: 2023-01-10

## 2023-01-10 VITALS
HEIGHT: 65 IN | WEIGHT: 173 LBS | SYSTOLIC BLOOD PRESSURE: 124 MMHG | BODY MASS INDEX: 28.82 KG/M2 | DIASTOLIC BLOOD PRESSURE: 74 MMHG

## 2023-01-10 DIAGNOSIS — N63.41 SUBAREOLAR MASS OF RIGHT BREAST: ICD-10-CM

## 2023-01-10 DIAGNOSIS — Z01.419 ENCOUNTER FOR ANNUAL ROUTINE GYNECOLOGICAL EXAMINATION: Primary | ICD-10-CM

## 2023-01-10 DIAGNOSIS — Z12.31 ENCOUNTER FOR SCREENING MAMMOGRAM FOR MALIGNANT NEOPLASM OF BREAST: ICD-10-CM

## 2023-01-10 NOTE — PROGRESS NOTES
Assessment/Plan:  Pap smear done as well as annual   Encourage self breast examination as well as calcium supplementation  Recommend diagnostic right mammogram, right breast ultrasound attention areola complex 6:00  Patient also due for left screening mammogram   Reviewed colon cancer screening, up-to-date with colonoscopy  She will continue to follow-up with primary care as scheduled  Return to office in 1 year or as needed  No problem-specific Assessment & Plan notes found for this encounter  Diagnoses and all orders for this visit:    Encounter for annual routine gynecological examination  -     Liquid-based pap, screening    Encounter for screening mammogram for malignant neoplasm of breast    Subareolar mass of right breast  -     Mammo diagnostic right w 3d & cad; Future  -     US breast right limited (diagnostic); Future          Subjective:      Patient ID: Farhat Burden is a 59 y o  female  HPI     This is a pleasant 17-year-old female G0 who presents for her GYN exam   She went through menopause at age 50  She is never been on hormone replacement therapy  She denies any vaginal bleeding or spotting  No changes in bowel or bladder function  She has been in a monogamous relationship for over 27 years  Pap smears have been normal   Colonoscopy done 9/2022 revealing diverticulitis she continues to follow-up with her gastroenterologist     In the course of the year, her sister has passed away at age 77, Alzheimer's disease  The following portions of the patient's history were reviewed and updated as appropriate: allergies, current medications, past family history, past medical history, past social history, past surgical history and problem list     Review of Systems   Constitutional: Negative for fatigue, fever and unexpected weight change  Respiratory: Negative for cough, chest tightness, shortness of breath and wheezing  Cardiovascular: Negative    Negative for chest pain and palpitations  Gastrointestinal: Negative  Negative for abdominal distention, abdominal pain, blood in stool, constipation, diarrhea, nausea and vomiting  Genitourinary: Negative  Negative for difficulty urinating, dyspareunia, dysuria, flank pain, frequency, genital sores, hematuria, pelvic pain, urgency, vaginal bleeding, vaginal discharge and vaginal pain  Skin: Negative for rash  Objective:      /74   Ht 5' 5" (1 651 m)   Wt 78 5 kg (173 lb)   BMI 28 79 kg/m²          Physical Exam  Constitutional:       Appearance: Normal appearance  She is well-developed  Cardiovascular:      Rate and Rhythm: Normal rate and regular rhythm  Pulmonary:      Effort: Pulmonary effort is normal       Breath sounds: Normal breath sounds  Chest:   Breasts:     Right: Mass present  No inverted nipple, nipple discharge, skin change or tenderness  Left: No inverted nipple, mass, nipple discharge, skin change or tenderness  Comments: Right breast 6:00 areolar complex, mobile slightly tender 1-2 cm lump  Abdominal:      General: Bowel sounds are normal  There is no distension  Palpations: Abdomen is soft  Tenderness: There is no abdominal tenderness  There is no guarding or rebound  Genitourinary:     Labia:         Right: No rash, tenderness or lesion  Left: No rash, tenderness or lesion  Vagina: Normal  No signs of injury  No vaginal discharge or tenderness  Cervix: No cervical motion tenderness, discharge, friability, lesion, erythema or cervical bleeding  Uterus: Not enlarged and not tender  Adnexa:         Right: No mass, tenderness or fullness  Left: No mass, tenderness or fullness  Neurological:      Mental Status: She is alert and oriented to person, place, and time     Psychiatric:         Behavior: Behavior normal

## 2023-01-12 LAB
HPV HR 12 DNA CVX QL NAA+PROBE: NEGATIVE
HPV16 DNA CVX QL NAA+PROBE: NEGATIVE
HPV18 DNA CVX QL NAA+PROBE: NEGATIVE

## 2023-01-18 LAB
LAB AP GYN PRIMARY INTERPRETATION: NORMAL
Lab: NORMAL

## 2023-01-19 ENCOUNTER — OFFICE VISIT (OUTPATIENT)
Dept: INTERNAL MEDICINE CLINIC | Facility: CLINIC | Age: 58
End: 2023-01-19

## 2023-01-19 VITALS
DIASTOLIC BLOOD PRESSURE: 70 MMHG | HEART RATE: 66 BPM | SYSTOLIC BLOOD PRESSURE: 122 MMHG | WEIGHT: 171.2 LBS | BODY MASS INDEX: 28.49 KG/M2 | OXYGEN SATURATION: 95 %

## 2023-01-19 DIAGNOSIS — E03.9 ACQUIRED HYPOTHYROIDISM: ICD-10-CM

## 2023-01-19 DIAGNOSIS — Z13.1 SCREENING FOR DIABETES MELLITUS: ICD-10-CM

## 2023-01-19 DIAGNOSIS — K57.92 DIVERTICULITIS: Primary | ICD-10-CM

## 2023-01-19 DIAGNOSIS — Z13.220 SCREENING, LIPID: ICD-10-CM

## 2023-01-19 NOTE — ASSESSMENT & PLAN NOTE
Recurrent, recent episode resolved without antibiotics  She is up to date with her colonoscopy  Discussed high fiber diet  Will continue to treat conservatively and hold off on antibiotic therapy as much as possible for future epsides

## 2023-01-19 NOTE — PROGRESS NOTES
Assessment/Plan:    Diverticulitis  Recurrent, recent episode resolved without antibiotics  She is up to date with her colonoscopy  Discussed high fiber diet  Will continue to treat conservatively and hold off on antibiotic therapy as much as possible for future epsides    BMI Counseling: Body mass index is 28 49 kg/m²  The BMI is above normal  Nutrition recommendations include consuming healthier snacks  Encouraged to get the COVID booster     Problem List Items Addressed This Visit        Endocrine    Hypothyroidism    Relevant Orders    CBC and Platelet    Comprehensive metabolic panel    TSH, 3rd generation with Free T4 reflex       Other    Diverticulitis - Primary     Recurrent, recent episode resolved without antibiotics  She is up to date with her colonoscopy  Discussed high fiber diet  Will continue to treat conservatively and hold off on antibiotic therapy as much as possible for future epsides        Other Visit Diagnoses     Screening for diabetes mellitus        Relevant Orders    HEMOGLOBIN A1C W/ EAG ESTIMATION    Screening, lipid        Relevant Orders    Lipid Panel with Direct LDL reflex            Subjective:      Patient ID: Lupe Villa is a 59 y o  female  HPI  Recurrent diverticulitis  Most recent suspected episode was this week, after eating popcorn  She experienced lower abdominal pain and bloating  Her symptoms improved without antibiotics  Feeling well overall  Recent labs reviewed and FBS elevated,TG improved   She has been eating [employer] regularly  Scheduled for diagnostic mammogram for an abnormal breast  exam    The following portions of the patient's history were reviewed and updated as appropriate: allergies, current medications, past family history, past medical history, past social history, past surgical history and problem list     Review of Systems   Respiratory: Negative for shortness of breath  Cardiovascular: Negative for chest pain and palpitations  Gastrointestinal: Negative for abdominal pain, constipation and diarrhea  Objective:      /70   Pulse 66   Wt 77 7 kg (171 lb 3 2 oz)   SpO2 95%   BMI 28 49 kg/m²          Physical Exam  Constitutional:       General: She is not in acute distress  Appearance: She is well-developed  She is not ill-appearing, toxic-appearing or diaphoretic  Eyes:      Conjunctiva/sclera: Conjunctivae normal    Cardiovascular:      Rate and Rhythm: Normal rate and regular rhythm  Heart sounds: Normal heart sounds  No murmur heard  Pulmonary:      Effort: Pulmonary effort is normal  No respiratory distress  Breath sounds: Normal breath sounds  No wheezing or rales  Abdominal:      General: There is no distension  Palpations: Abdomen is soft  There is no mass  Tenderness: There is no abdominal tenderness  There is no guarding or rebound  Musculoskeletal:      Cervical back: Neck supple  Right lower leg: No edema  Left lower leg: No edema  Neurological:      Mental Status: She is alert and oriented to person, place, and time  Psychiatric:         Mood and Affect: Mood normal          Behavior: Behavior normal          Thought Content:  Thought content normal          Judgment: Judgment normal

## 2023-02-16 ENCOUNTER — HOSPITAL ENCOUNTER (OUTPATIENT)
Dept: MAMMOGRAPHY | Facility: CLINIC | Age: 58
Discharge: HOME | End: 2023-02-16

## 2023-02-16 ENCOUNTER — HOSPITAL ENCOUNTER (OUTPATIENT)
Dept: ULTRASOUND IMAGING | Facility: CLINIC | Age: 58
Discharge: HOME | End: 2023-02-16

## 2023-02-16 VITALS — HEIGHT: 65 IN | WEIGHT: 171 LBS | BODY MASS INDEX: 28.49 KG/M2

## 2023-02-16 DIAGNOSIS — N63.41 SUBAREOLAR MASS OF RIGHT BREAST: ICD-10-CM

## 2023-02-20 ENCOUNTER — TELEPHONE (OUTPATIENT)
Dept: OBGYN CLINIC | Facility: CLINIC | Age: 58
End: 2023-02-20

## 2023-02-20 NOTE — TELEPHONE ENCOUNTER
----- Message from Denny Parsons DO sent at 2/19/2023  4:22 PM EST -----  Please call the patient, inform pt mammo/us reveals fibrocystic breast, otherwise normal resume annual 3d mammo

## 2023-02-20 NOTE — TELEPHONE ENCOUNTER
Pt informed of mammogram/U/S results - recom can f/u with breast specialist - pt states what she is feeling has been present for years  recom to recall if any changes with BSE

## 2023-03-05 DIAGNOSIS — E03.9 HYPOTHYROIDISM, UNSPECIFIED TYPE: ICD-10-CM

## 2023-03-05 RX ORDER — LEVOTHYROXINE SODIUM 0.07 MG/1
TABLET ORAL
Qty: 90 TABLET | Refills: 0 | Status: SHIPPED | OUTPATIENT
Start: 2023-03-05

## 2023-03-26 DIAGNOSIS — E78.00 HYPERCHOLESTEROLEMIA: ICD-10-CM

## 2023-03-26 RX ORDER — ROSUVASTATIN CALCIUM 20 MG/1
TABLET, COATED ORAL
Qty: 90 TABLET | Refills: 0 | Status: SHIPPED | OUTPATIENT
Start: 2023-03-26

## 2023-05-19 ENCOUNTER — APPOINTMENT (OUTPATIENT)
Dept: LAB | Facility: CLINIC | Age: 58
End: 2023-05-19

## 2023-05-19 DIAGNOSIS — Z13.1 SCREENING FOR DIABETES MELLITUS: ICD-10-CM

## 2023-05-19 DIAGNOSIS — Z13.220 SCREENING, LIPID: ICD-10-CM

## 2023-05-19 DIAGNOSIS — E03.9 ACQUIRED HYPOTHYROIDISM: ICD-10-CM

## 2023-05-19 LAB
ALBUMIN SERPL BCP-MCNC: 4.3 G/DL (ref 3.5–5)
ALP SERPL-CCNC: 71 U/L (ref 46–116)
ALT SERPL W P-5'-P-CCNC: 32 U/L (ref 12–78)
ANION GAP SERPL CALCULATED.3IONS-SCNC: -1 MMOL/L (ref 4–13)
AST SERPL W P-5'-P-CCNC: 18 U/L (ref 5–45)
BILIRUB SERPL-MCNC: 0.6 MG/DL (ref 0.2–1)
BUN SERPL-MCNC: 16 MG/DL (ref 5–25)
CALCIUM SERPL-MCNC: 9.1 MG/DL (ref 8.3–10.1)
CHLORIDE SERPL-SCNC: 106 MMOL/L (ref 96–108)
CHOLEST SERPL-MCNC: 181 MG/DL
CO2 SERPL-SCNC: 28 MMOL/L (ref 21–32)
CREAT SERPL-MCNC: 0.84 MG/DL (ref 0.6–1.3)
ERYTHROCYTE [DISTWIDTH] IN BLOOD BY AUTOMATED COUNT: 13.6 % (ref 11.6–15.1)
EST. AVERAGE GLUCOSE BLD GHB EST-MCNC: 126 MG/DL
GFR SERPL CREATININE-BSD FRML MDRD: 75 ML/MIN/1.73SQ M
GLUCOSE P FAST SERPL-MCNC: 113 MG/DL (ref 65–99)
HBA1C MFR BLD: 6 %
HCT VFR BLD AUTO: 38.8 % (ref 34.8–46.1)
HDLC SERPL-MCNC: 56 MG/DL
HGB BLD-MCNC: 12.9 G/DL (ref 11.5–15.4)
LDLC SERPL CALC-MCNC: 97 MG/DL (ref 0–100)
MCH RBC QN AUTO: 28.7 PG (ref 26.8–34.3)
MCHC RBC AUTO-ENTMCNC: 33.2 G/DL (ref 31.4–37.4)
MCV RBC AUTO: 86 FL (ref 82–98)
PLATELET # BLD AUTO: 319 THOUSANDS/UL (ref 149–390)
PMV BLD AUTO: 10.6 FL (ref 8.9–12.7)
POTASSIUM SERPL-SCNC: 4.2 MMOL/L (ref 3.5–5.3)
PROT SERPL-MCNC: 7.8 G/DL (ref 6.4–8.4)
RBC # BLD AUTO: 4.49 MILLION/UL (ref 3.81–5.12)
SODIUM SERPL-SCNC: 133 MMOL/L (ref 135–147)
TRIGL SERPL-MCNC: 141 MG/DL
TSH SERPL DL<=0.05 MIU/L-ACNC: 0.83 UIU/ML (ref 0.45–4.5)
WBC # BLD AUTO: 7.37 THOUSAND/UL (ref 4.31–10.16)

## 2023-05-23 ENCOUNTER — TELEPHONE (OUTPATIENT)
Dept: INTERNAL MEDICINE CLINIC | Facility: CLINIC | Age: 58
End: 2023-05-23

## 2023-05-23 NOTE — TELEPHONE ENCOUNTER
Anesthesia Post-op Note    Patient: Micah Hansen  Procedure(s) Performed: LEFT FOOT/ANKLE INCISIONAL BIOPSY WITH SPECIMENS SENT FROZEN TO LAB AND POSSIBLE EXCISION SOFT TISSUE MASS - LEFT  Anesthesia type: MAC    Vitals Value Taken Time   Temp 36.7 °C (98.1 °F) 05/03/23 1407   Pulse 68 05/03/23 1407   Resp 16 05/03/23 1407   SpO2 100 % 05/03/23 1407   /65 05/03/23 1407         Patient Location: Phase II  Post-op Vital Signs:stable  Level of Consciousness: awake and alert  Respiratory Status: spontaneous ventilation  Cardiovascular stable  Hydration: euvolemic  Pain Management: adequately controlled  Handoff: Handoff to receiving clinician was performed and questions were answered  Vomiting: none  Nausea: None  Airway Patency:patent  Post-op Assessment: no complications and patient tolerated procedure well with no complications      No notable events documented.   Can she come at 930 tomorrow

## 2023-05-23 NOTE — TELEPHONE ENCOUNTER
Patient has white bumps in her mouth and a little ulcer on the roof of her mouth  Patient is also very fatigued  States she slept 11 hours yesterday  Patient would like to be seen in office if possible        Please advise

## 2023-05-24 ENCOUNTER — OFFICE VISIT (OUTPATIENT)
Dept: INTERNAL MEDICINE CLINIC | Facility: CLINIC | Age: 58
End: 2023-05-24

## 2023-05-24 VITALS
DIASTOLIC BLOOD PRESSURE: 84 MMHG | TEMPERATURE: 97.5 F | HEIGHT: 65 IN | BODY MASS INDEX: 27.92 KG/M2 | OXYGEN SATURATION: 96 % | WEIGHT: 167.6 LBS | HEART RATE: 61 BPM | SYSTOLIC BLOOD PRESSURE: 126 MMHG

## 2023-05-24 DIAGNOSIS — J02.9 PHARYNGITIS, UNSPECIFIED ETIOLOGY: Primary | ICD-10-CM

## 2023-05-24 DIAGNOSIS — E87.1 HYPONATREMIA: Primary | ICD-10-CM

## 2023-05-24 LAB — S PYO AG THROAT QL: NEGATIVE

## 2023-05-24 NOTE — LETTER
May 24, 2023     Patient: Belinda Carmichael  YOB: 1965  Date of Visit: 5/24/2023      To Whom it May Concern:    Soni Craft is under my professional care  Davin Arevalo was seen in my office on 5/24/2023  Davin Nigh may return to work on 5/25/2023 Dx: pharyngitis   Please excuse from work today 5/24/2023  If you have any questions or concerns, please don't hesitate to call           Sincerely,          Elenita Mayberry MD        CC: No Recipients

## 2023-05-24 NOTE — PROGRESS NOTES
"Assessment/Plan:  Likely viral illness  Continue symptom directed treatment  Call if not improving     Problem List Items Addressed This Visit    None  Visit Diagnoses     Pharyngitis, unspecified etiology    -  Primary    Relevant Orders    POCT rapid strepA (Completed)            Subjective:      Patient ID: Nadine Evans is a 59 y o  female  HPI  Started with a sore throat 4 days ago with an ulcer on the roof of the mouth  2 days ago started feeling tired  Also with a few white spots in the mouth  Using lidocaine solution for the painful ulcers  Denies fever chills but feels very tired  Denies nasal congestion rhinorrhea cough nausea vomiting diarrhea      The following portions of the patient's history were reviewed and updated as appropriate: allergies, current medications, past family history, past medical history, past social history, past surgical history and problem list     Review of Systems   Constitutional: Positive for fatigue  Negative for fever  HENT: Positive for mouth sores and sore throat  Negative for congestion, ear pain, rhinorrhea and trouble swallowing  Respiratory: Negative for cough and shortness of breath  Cardiovascular: Negative for chest pain  Gastrointestinal: Negative for abdominal pain, diarrhea, nausea and vomiting  Objective:      /84   Pulse 61   Temp 97 5 °F (36 4 °C) (Tympanic)   Ht 5' 5\" (1 651 m)   Wt 76 kg (167 lb 9 6 oz)   SpO2 96%   BMI 27 89 kg/m²          Physical Exam  Constitutional:       Appearance: She is not ill-appearing  HENT:      Right Ear: External ear normal  There is no impacted cerumen  Left Ear: External ear normal  There is no impacted cerumen  Mouth/Throat:      Mouth: Oral lesions (ulcer on the roof of the mouth on the left and few whitish papules on the buccal mucosa) present  Pharynx: No posterior oropharyngeal erythema       Eyes:      Conjunctiva/sclera: Conjunctivae normal    Cardiovascular:      " Rate and Rhythm: Regular rhythm  Heart sounds: Normal heart sounds  Pulmonary:      Breath sounds: Normal breath sounds  Musculoskeletal:      Cervical back: Neck supple

## 2023-06-03 DIAGNOSIS — E03.9 HYPOTHYROIDISM, UNSPECIFIED TYPE: ICD-10-CM

## 2023-06-03 RX ORDER — LEVOTHYROXINE SODIUM 0.07 MG/1
TABLET ORAL
Qty: 90 TABLET | Refills: 0 | Status: SHIPPED | OUTPATIENT
Start: 2023-06-03

## 2023-06-21 ENCOUNTER — OFFICE VISIT (OUTPATIENT)
Dept: INTERNAL MEDICINE CLINIC | Facility: CLINIC | Age: 58
End: 2023-06-21
Payer: COMMERCIAL

## 2023-06-21 VITALS
OXYGEN SATURATION: 99 % | BODY MASS INDEX: 28.06 KG/M2 | WEIGHT: 168.4 LBS | SYSTOLIC BLOOD PRESSURE: 110 MMHG | HEART RATE: 66 BPM | HEIGHT: 65 IN | DIASTOLIC BLOOD PRESSURE: 70 MMHG

## 2023-06-21 DIAGNOSIS — J01.90 ACUTE NON-RECURRENT SINUSITIS, UNSPECIFIED LOCATION: Primary | ICD-10-CM

## 2023-06-21 PROCEDURE — 99213 OFFICE O/P EST LOW 20 MIN: CPT | Performed by: INTERNAL MEDICINE

## 2023-06-21 RX ORDER — DOXYCYCLINE HYCLATE 100 MG/1
100 CAPSULE ORAL EVERY 12 HOURS SCHEDULED
Qty: 20 CAPSULE | Refills: 0 | Status: SHIPPED | OUTPATIENT
Start: 2023-06-21 | End: 2023-07-01

## 2023-06-21 NOTE — PROGRESS NOTES
"Assessment/Plan:  Start doxycycline  Continue Mucinex and Sudafed PRN       Problem List Items Addressed This Visit    None  Visit Diagnoses     Acute non-recurrent sinusitis, unspecified location    -  Primary    Relevant Medications    doxycycline hyclate (VIBRAMYCIN) 100 mg capsule            Subjective:      Patient ID: Ewelina Weinberg is a 59 y o  female  HPI  Started to cold and flulike symptoms a week and a half ago while out of town  +  COVID and flu a urgent care  Prescribed Amoxicillin 500mg BID  Continues to have chills, no fever  + nasal congestion and chest congestion    The following portions of the patient's history were reviewed and updated as appropriate: allergies, current medications, past family history, past medical history, past social history, past surgical history and problem list     Review of Systems   Constitutional: Positive for chills  Negative for fever  HENT: Positive for congestion  Respiratory: Positive for cough  Gastrointestinal: Negative for diarrhea  Objective:      /70 (BP Location: Left arm, Patient Position: Sitting, Cuff Size: Standard)   Pulse 66   Ht 5' 5\" (1 651 m)   Wt 76 4 kg (168 lb 6 4 oz)   SpO2 99%   BMI 28 02 kg/m²          Physical Exam  Constitutional:       Appearance: She is well-developed  She is ill-appearing  HENT:      Head: Normocephalic and atraumatic  Comments: No facial tenderness     Right Ear: External ear normal  There is no impacted cerumen  Left Ear: External ear normal  There is no impacted cerumen  Eyes:      Conjunctiva/sclera: Conjunctivae normal    Cardiovascular:      Rate and Rhythm: Normal rate and regular rhythm  Heart sounds: Normal heart sounds  No murmur heard  Pulmonary:      Effort: Pulmonary effort is normal  No respiratory distress  Breath sounds: Normal breath sounds  No wheezing or rales  Musculoskeletal:         General: Normal range of motion        Cervical back: Neck " supple  Skin:     General: Skin is warm and dry  Neurological:      Mental Status: She is alert and oriented to person, place, and time  Psychiatric:         Behavior: Behavior normal          Thought Content:  Thought content normal          Judgment: Judgment normal

## 2023-06-21 NOTE — LETTER
June 21, 2023     Patient: Kole Peterson  YOB: 1965  Date of Visit: 6/21/2023      To Whom it May Concern:    Natalia Sanchez is under my professional care  Tamica Ross was seen in my office on 6/21/2023  Tamicahannah Ross may return to work on June 24 2023   Please excuse from work June 21-22  If you have any questions or concerns, please don't hesitate to call           Sincerely,          Gena Grossman MD        CC: No Recipients

## 2023-07-01 DIAGNOSIS — E78.00 HYPERCHOLESTEROLEMIA: ICD-10-CM

## 2023-07-01 DIAGNOSIS — F33.1 MODERATE EPISODE OF RECURRENT MAJOR DEPRESSIVE DISORDER (HCC): ICD-10-CM

## 2023-07-01 RX ORDER — ROSUVASTATIN CALCIUM 20 MG/1
TABLET, COATED ORAL
Qty: 90 TABLET | Refills: 0 | Status: SHIPPED | OUTPATIENT
Start: 2023-07-01

## 2023-07-01 RX ORDER — BUPROPION HYDROCHLORIDE 150 MG/1
TABLET ORAL
Qty: 90 TABLET | Refills: 0 | Status: SHIPPED | OUTPATIENT
Start: 2023-07-01

## 2023-07-11 PROCEDURE — 88341 IMHCHEM/IMCYTCHM EA ADD ANTB: CPT | Performed by: PATHOLOGY

## 2023-07-11 PROCEDURE — 88305 TISSUE EXAM BY PATHOLOGIST: CPT | Performed by: PATHOLOGY

## 2023-07-11 PROCEDURE — 88342 IMHCHEM/IMCYTCHM 1ST ANTB: CPT | Performed by: PATHOLOGY

## 2023-07-11 PROCEDURE — 88377 M/PHMTRC ALYS ISHQUANT/SEMIQ: CPT | Performed by: PATHOLOGY

## 2023-07-12 ENCOUNTER — LAB REQUISITION (OUTPATIENT)
Dept: LAB | Facility: HOSPITAL | Age: 58
End: 2023-07-12
Payer: COMMERCIAL

## 2023-07-12 DIAGNOSIS — D48.5 NEOPLASM OF UNCERTAIN BEHAVIOR OF SKIN: ICD-10-CM

## 2023-07-18 ENCOUNTER — OFFICE VISIT (OUTPATIENT)
Dept: INTERNAL MEDICINE CLINIC | Facility: CLINIC | Age: 58
End: 2023-07-18
Payer: COMMERCIAL

## 2023-07-18 ENCOUNTER — APPOINTMENT (OUTPATIENT)
Dept: LAB | Facility: CLINIC | Age: 58
End: 2023-07-18
Payer: COMMERCIAL

## 2023-07-18 VITALS
OXYGEN SATURATION: 98 % | DIASTOLIC BLOOD PRESSURE: 78 MMHG | SYSTOLIC BLOOD PRESSURE: 128 MMHG | HEART RATE: 72 BPM | WEIGHT: 170.8 LBS | HEIGHT: 65 IN | BODY MASS INDEX: 28.46 KG/M2

## 2023-07-18 DIAGNOSIS — R73.01 IMPAIRED FASTING BLOOD SUGAR: ICD-10-CM

## 2023-07-18 DIAGNOSIS — E03.9 ACQUIRED HYPOTHYROIDISM: ICD-10-CM

## 2023-07-18 DIAGNOSIS — F41.9 ANXIETY: ICD-10-CM

## 2023-07-18 DIAGNOSIS — E87.1 HYPONATREMIA: ICD-10-CM

## 2023-07-18 DIAGNOSIS — Z00.00 ANNUAL PHYSICAL EXAM: Primary | ICD-10-CM

## 2023-07-18 DIAGNOSIS — R11.0 NAUSEA: ICD-10-CM

## 2023-07-18 PROBLEM — K57.92 DIVERTICULITIS: Status: RESOLVED | Noted: 2022-03-14 | Resolved: 2023-07-18

## 2023-07-18 LAB — SODIUM SERPL-SCNC: 139 MMOL/L (ref 135–147)

## 2023-07-18 PROCEDURE — 84295 ASSAY OF SERUM SODIUM: CPT

## 2023-07-18 PROCEDURE — 36415 COLL VENOUS BLD VENIPUNCTURE: CPT

## 2023-07-18 PROCEDURE — 99396 PREV VISIT EST AGE 40-64: CPT | Performed by: INTERNAL MEDICINE

## 2023-07-18 RX ORDER — FLUTICASONE PROPIONATE 50 MCG
SPRAY, SUSPENSION (ML) NASAL
COMMUNITY
Start: 2023-07-09

## 2023-07-18 RX ORDER — ONDANSETRON 4 MG/1
4 TABLET, ORALLY DISINTEGRATING ORAL EVERY 6 HOURS PRN
Qty: 20 TABLET | Refills: 0 | Status: SHIPPED | OUTPATIENT
Start: 2023-07-18

## 2023-07-18 RX ORDER — ALPRAZOLAM 0.25 MG/1
0.25 TABLET ORAL DAILY PRN
Qty: 10 TABLET | Refills: 0 | Status: SHIPPED | OUTPATIENT
Start: 2023-07-18

## 2023-07-18 NOTE — PROGRESS NOTES
[address] [facility] Rosemarie Johnson    NAME: Baldomero Broussard Aysha  AGE: 59 y.o. SEX: female  : 1965     DATE: 2023     Assessment and Plan:     Problem List Items Addressed This Visit        Endocrine    Hypothyroidism    Relevant Orders    TSH, 3rd generation with Free T4 reflex   Other Visit Diagnoses     Annual physical exam    -  Primary    Nausea        Relevant Medications    ondansetron (Zofran ODT) 4 mg disintegrating tablet    Anxiety        Relevant Medications    ALPRAZolam (XANAX) 0.25 mg tablet    Impaired fasting blood sugar        Relevant Orders    HEMOGLOBIN A1C W/ EAG ESTIMATION          Immunizations and preventive care screenings were discussed with patient today. Appropriate education was printed on patient's after visit summary. Counseling:  Exercise: the importance of regular exercise/physical activity was discussed. Recommend exercise 3-5 times per week for at least 30 minutes. Discussed extended ambulatory cardiac monitoring due to her watch detecting HR in the 50s and 150s. It has been infrequent so we agreed to continue to monitor. Return in about 6 months (around 2024). Chief Complaint:     Chief Complaint   Patient presents with   • Follow-up     6 month      History of Present Illness:     Adult Annual Physical   Patient here for a comprehensive physical exam. The patient reports no problems. Diet and Physical Activity  Diet/Nutrition: high is sugar. Exercise: no formal exercise.       Depression Screening  PHQ-2/9 Depression Screening    Little interest or pleasure in doing things: 0 - not at all  Feeling down, depressed, or hopeless: 0 - not at all  Trouble falling or staying asleep, or sleeping too much: 0 - not at all  Feeling tired or having little energy: 0 - not at all  Poor appetite or overeatin - not at all  Feeling bad about yourself - or that you are a failure or have let yourself or your family down: 0 - not at all  Trouble concentrating on things, such as reading the newspaper or watching television: 0 - not at all  Moving or speaking so slowly that other people could have noticed. Or the opposite - being so fidgety or restless that you have been moving around a lot more than usual: 0 - not at all  Thoughts that you would be better off dead, or of hurting yourself in some way: 0 - not at all  PHQ-9 Score: 0   PHQ-9 Interpretation: No or Minimal depression        General Health  Sleep: sleeps well. Hearing: normal - bilateral.  Vision: most recent eye exam >1 year ago and wears glasses. Dental: regular dental visits. /GYN Health  Patient is: postmenopausal   Review of Systems:     Review of Systems   Respiratory: Negative for shortness of breath. Cardiovascular: Positive for palpitations (infrequent, watch showed HP in the 50s  in May and 150s a few months prior). Negative for chest pain. Gastrointestinal: Negative for abdominal pain, constipation and diarrhea. Genitourinary: Negative for difficulty urinating. Neurological: Negative for dizziness and headaches.       Past Medical History:     Past Medical History:   Diagnosis Date   • Cervicalgia     last assessed: 10/23/2012   • Colon polyp    • Diverticula of colon    • Diverticulitis 3/14/2022   • Fibroid 01/2015    US reveals 1.8 cm rt intramural, 4.8 pedunculated fibroid rt adnexa   • Insomnia       Past Surgical History:     Past Surgical History:   Procedure Laterality Date   • COLONOSCOPY     • LAPAROSCOPIC CHOLECYSTECTOMY        Social History:     Social History     Socioeconomic History   • Marital status: /Civil Union     Spouse name: None   • Number of children: None   • Years of education: None   • Highest education level: None   Occupational History   • None   Tobacco Use   • Smoking status: Former     Packs/day: 0.20     Years: 15.00     Total pack years: 3.00     Types: Cigarettes Start date:      Quit date:      Years since quittin.5   • Smokeless tobacco: Never   Vaping Use   • Vaping Use: Never used   Substance and Sexual Activity   • Alcohol use: Yes     Alcohol/week: 4.0 standard drinks of alcohol     Types: 4 Standard drinks or equivalent per week     Comment: social   • Drug use: No   • Sexual activity: Not Currently     Birth control/protection: Post-menopausal   Other Topics Concern   • None   Social History Narrative   • None     Social Determinants of Health     Financial Resource Strain: Not on file   Food Insecurity: Not on file   Transportation Needs: Not on file   Physical Activity: Not on file   Stress: Not on file   Social Connections: Not on file   Intimate Partner Violence: Not on file   Housing Stability: Not on file      Family History:     Family History   Problem Relation Age of Onset   • Stroke Mother    • Stroke Father    • Dementia Sister    • Alzheimer's disease Sister    • Stroke Brother    • No Known Problems Maternal Aunt    • No Known Problems Maternal Aunt    • No Known Problems Maternal Aunt    • No Known Problems Maternal Aunt    • No Known Problems Paternal Aunt    • Breast cancer Cousin 48   • Uterine cancer Cousin         68   • Diabetes Family    • Hyperlipidemia Family    • Hypertension Family       Current Medications:     Current Outpatient Medications   Medication Sig Dispense Refill   • ALPRAZolam (XANAX) 0.25 mg tablet Take 1 tablet (0.25 mg total) by mouth daily as needed for anxiety Do not take with alcohol. Do not drive after taking this.  10 tablet 0   • aspirin (ECOTRIN LOW STRENGTH) 81 mg EC tablet Take 1 tablet (81 mg total) by mouth daily     • buPROPion (WELLBUTRIN XL) 150 mg 24 hr tablet TAKE ONE TABLET BY MOUTH DAILY 90 tablet 0   • cetirizine (ZyrTEC) 10 mg tablet Take 10 mg by mouth daily     • cholecalciferol (VITAMIN D3) 400 units tablet Take 400 Units by mouth daily     • Lactobacillus (PROBIOTIC ACIDOPHILUS PO) Take by mouth     • levothyroxine 75 mcg tablet TAKE ONE TABLET BY MOUTH DAILY 90 tablet 0   • Multiple Vitamin (MULTIVITAMINS PO) Take 1 tablet by mouth daily     • Omega-3 Fatty Acids (OMEGA-3 FISH OIL) 1200 MG CAPS Take by mouth     • ondansetron (Zofran ODT) 4 mg disintegrating tablet Take 1 tablet (4 mg total) by mouth every 6 (six) hours as needed for nausea or vomiting 20 tablet 0   • rosuvastatin (CRESTOR) 20 MG tablet TAKE ONE TABLET BY MOUTH EVERY DAY 90 tablet 0   • vitamin E 100 UNIT capsule Take 100 Units by mouth daily     • fluticasone (FLONASE) 50 mcg/act nasal spray        No current facility-administered medications for this visit. Allergies: Allergies   Allergen Reactions   • Osphena [Ospemifene] Hives   • Food    • Nuts - Food Allergy    • Pollen Extract    • Sesame Seed (Diagnostic) - Food Allergy    • Sulfamethoxazole-Trimethoprim Headache   • Wheat Bran - Food Allergy       Physical Exam:     /78 (BP Location: Left arm, Patient Position: Sitting, Cuff Size: Standard)   Pulse 72   Ht 5' 5" (1.651 m)   Wt 77.5 kg (170 lb 12.8 oz)   SpO2 98%   BMI 28.42 kg/m²     Physical Exam  Constitutional:       General: She is not in acute distress. Appearance: She is well-developed. She is not ill-appearing, toxic-appearing or diaphoretic. HENT:      Head: Normocephalic and atraumatic. Right Ear: External ear normal. There is no impacted cerumen. Left Ear: External ear normal. There is no impacted cerumen. Eyes:      Conjunctiva/sclera: Conjunctivae normal.   Cardiovascular:      Rate and Rhythm: Normal rate and regular rhythm. Heart sounds: Normal heart sounds. No murmur heard. Pulmonary:      Effort: Pulmonary effort is normal. No respiratory distress. Breath sounds: Normal breath sounds. No stridor. No wheezing or rales. Abdominal:      General: There is no distension. Palpations: Abdomen is soft. There is no mass. Tenderness:  There is no abdominal tenderness. There is no guarding or rebound. Musculoskeletal:      Cervical back: Neck supple. Right lower leg: No edema. Left lower leg: No edema. Neurological:      Mental Status: She is alert and oriented to person, place, and time. Psychiatric:         Mood and Affect: Mood normal.         Behavior: Behavior normal.         Thought Content:  Thought content normal.         Judgment: Judgment normal.          Manohar James MD  [facility]

## 2023-07-18 NOTE — PROGRESS NOTES
Assessment/Plan:    No problem-specific Assessment & Plan notes found for this encounter. Problem List Items Addressed This Visit    None        Subjective:      Patient ID: Abby Soto is a 59 y.o. female.     HPI    The following portions of the patient's history were reviewed and updated as appropriate: allergies, current medications, past family history, past medical history, past social history, past surgical history and problem list.    Review of Systems      Objective:      /78 (BP Location: Left arm, Patient Position: Sitting, Cuff Size: Standard)   Pulse 72   Ht 5' 5" (1.651 m)   Wt 77.5 kg (170 lb 12.8 oz)   SpO2 98%   BMI 28.42 kg/m²          Physical Exam

## 2023-07-26 PROCEDURE — 88342 IMHCHEM/IMCYTCHM 1ST ANTB: CPT | Performed by: PATHOLOGY

## 2023-07-26 PROCEDURE — 88341 IMHCHEM/IMCYTCHM EA ADD ANTB: CPT | Performed by: PATHOLOGY

## 2023-07-26 PROCEDURE — 88305 TISSUE EXAM BY PATHOLOGIST: CPT | Performed by: PATHOLOGY

## 2023-08-22 ENCOUNTER — OFFICE VISIT (OUTPATIENT)
Dept: INTERNAL MEDICINE CLINIC | Facility: CLINIC | Age: 58
End: 2023-08-22
Payer: COMMERCIAL

## 2023-08-22 ENCOUNTER — TELEPHONE (OUTPATIENT)
Dept: INTERNAL MEDICINE CLINIC | Facility: CLINIC | Age: 58
End: 2023-08-22

## 2023-08-22 VITALS
HEIGHT: 65 IN | WEIGHT: 171.8 LBS | DIASTOLIC BLOOD PRESSURE: 86 MMHG | BODY MASS INDEX: 28.62 KG/M2 | SYSTOLIC BLOOD PRESSURE: 140 MMHG | OXYGEN SATURATION: 97 % | HEART RATE: 71 BPM

## 2023-08-22 DIAGNOSIS — S86.912A KNEE STRAIN, LEFT, INITIAL ENCOUNTER: Primary | ICD-10-CM

## 2023-08-22 PROCEDURE — 99213 OFFICE O/P EST LOW 20 MIN: CPT | Performed by: INTERNAL MEDICINE

## 2023-08-22 NOTE — LETTER
August 22, 2023     Patient: Jose Antonio Mcdaniel  YOB: 1965  Date of Visit: 8/22/2023      To Whom it May Concern:    Nicol Nicole is under my professional care. Arlen Stallings was seen in my office on 8/22/2023. Arlen Stallings is recommended to go on light duty, avoid heavy lifting, prolonged standing due to left knee strain from 8/22-8/25/2023. If you have any questions or concerns, please don't hesitate to call.          Sincerely,          Dick Minor MD        CC: No Recipients

## 2023-08-22 NOTE — PROGRESS NOTES
Assessment/Plan:  Work note provided with restriction covering today until she sees ortho on 8/25  Recommended to use Voltaren gel or NSAIDs with food PRN         Problem List Items Addressed This Visit    None  Visit Diagnoses     Knee strain, left, initial encounter    -  Primary            Subjective:      Patient ID: Chris Meza is a 59 y.o. female. HPI  Left knee pain started a week ago, no known injury or trauma  The pain was in the outer and posterior knee  She was at a wedding 3 days ago and was able to dance but the knee was bothering her. The following day, she was in a lot of pain. She was able to work yesterday with difficulty. She needs a note for work to accommodate restriction, do light duty until she sees orthopedics in 3 days  The knee is slightly swollen  She has a knee brace for comfort    The following portions of the patient's history were reviewed and updated as appropriate: allergies, past family history, past medical history, past social history, past surgical history and problem list.    Review of Systems   Musculoskeletal: Positive for arthralgias. Objective:      /86 (BP Location: Left arm, Patient Position: Sitting, Cuff Size: Standard)   Pulse 71   Ht 5' 5" (1.651 m)   Wt 77.9 kg (171 lb 12.8 oz)   SpO2 97%   BMI 28.59 kg/m²          Physical Exam  Musculoskeletal:      Left knee: No swelling, erythema or crepitus. Decreased range of motion. Tenderness present over the medial joint line. Instability Tests: Medial Ana test negative and lateral Ana test negative.    Neurological:      Gait: Gait abnormal.

## 2023-08-22 NOTE — TELEPHONE ENCOUNTER
Pt called stating she was just seen at 8:15 am today.  Pt said her note for work needs to state "cannot lift more than 25 lbs right now at work"      Please advise        Asked for it to be faxed to her [phone]

## 2023-08-25 ENCOUNTER — OFFICE VISIT (OUTPATIENT)
Dept: OBGYN CLINIC | Facility: CLINIC | Age: 58
End: 2023-08-25
Payer: COMMERCIAL

## 2023-08-25 ENCOUNTER — APPOINTMENT (OUTPATIENT)
Dept: RADIOLOGY | Facility: AMBULARY SURGERY CENTER | Age: 58
End: 2023-08-25
Attending: ORTHOPAEDIC SURGERY
Payer: COMMERCIAL

## 2023-08-25 VITALS
HEIGHT: 65 IN | SYSTOLIC BLOOD PRESSURE: 131 MMHG | BODY MASS INDEX: 28.82 KG/M2 | DIASTOLIC BLOOD PRESSURE: 74 MMHG | WEIGHT: 173 LBS | HEART RATE: 71 BPM

## 2023-08-25 DIAGNOSIS — M25.562 LEFT KNEE PAIN, UNSPECIFIED CHRONICITY: ICD-10-CM

## 2023-08-25 DIAGNOSIS — S76.312A HAMSTRING STRAIN, LEFT, INITIAL ENCOUNTER: Primary | ICD-10-CM

## 2023-08-25 PROCEDURE — 99214 OFFICE O/P EST MOD 30 MIN: CPT | Performed by: ORTHOPAEDIC SURGERY

## 2023-08-25 PROCEDURE — 73562 X-RAY EXAM OF KNEE 3: CPT

## 2023-08-25 NOTE — PATIENT INSTRUCTIONS
Hamstring stretching:                                     Perform effective stretching exercises:   3 sets of 10 repetitions (rep)  Hold each rep for 20-30 seconds

## 2023-08-25 NOTE — PROGRESS NOTES
Assessment:  1. Left knee pain, unspecified chronicity  XR knee 3 vw left non injury      2. Hamstring strain, left, initial encounter            Plan:    • Patient with left  hamstring strain   • Weightbearing as tolerated   • Recommended patient to wear compression over the upper leg for comfort   • Recommended patient to do hamstring stretching exercises to help with the pain. Provided patient with hamstring stretching exercises to do at home   • Follow up PRN         The above stated was discussed in layman's terms and the patient expressed understanding. All questions were answered to the patient's satisfaction. Subjective:   Dave Dennis is a 59 y.o. female who presents establish care for left knee pain. Patient states two weeks ago she twisted her left knee and was limping around at work. She states she went to a wedding and was dancing and flared up her left knee. She states she was wearing her knee immobilizer for a few days and also took Advil per her PCP which helped with her pain and also put her on light duty. . She feels the knee is doing better but is still having pain. She is having pain over lateral and posterior left knee. She feels her knee is stable. She notes catching over lateral aspect of the knee. She has pain with deep knee flexion and dorsiflexion of ankle causes pain over posterolateral left knee. Her pain is worse with weightbearing and twisting. She is taking Advil and Tylenol for pain relief.        Review of systems negative unless otherwise specified in HPI    Past Medical History:   Diagnosis Date   • Cervicalgia     last assessed: 10/23/2012   • Colon polyp    • Diverticula of colon    • Diverticulitis 3/14/2022   • Fibroid 01/2015    US reveals 1.8 cm rt intramural, 4.8 pedunculated fibroid rt adnexa   • Insomnia        Past Surgical History:   Procedure Laterality Date   • COLONOSCOPY     • LAPAROSCOPIC CHOLECYSTECTOMY         Family History   Problem Relation Age of Onset   • Stroke Mother    • Stroke Father    • Dementia Sister    • Alzheimer's disease Sister    • Stroke Brother    • No Known Problems Maternal Aunt    • No Known Problems Maternal Aunt    • No Known Problems Maternal Aunt    • No Known Problems Maternal Aunt    • No Known Problems Paternal Aunt    • Breast cancer Cousin 48   • Uterine cancer Cousin         68   • Diabetes Family    • Hyperlipidemia Family    • Hypertension Family        Social History     Occupational History   • Not on file   Tobacco Use   • Smoking status: Former     Packs/day: 0.20     Years: 15.00     Total pack years: 3.00     Types: Cigarettes     Start date: 34     Quit date:      Years since quittin.6   • Smokeless tobacco: Never   Vaping Use   • Vaping Use: Never used   Substance and Sexual Activity   • Alcohol use: Yes     Alcohol/week: 4.0 standard drinks of alcohol     Types: 4 Standard drinks or equivalent per week     Comment: social   • Drug use: No   • Sexual activity: Not Currently     Birth control/protection: Post-menopausal         Current Outpatient Medications:   •  ALPRAZolam (XANAX) 0.25 mg tablet, Take 1 tablet (0.25 mg total) by mouth daily as needed for anxiety Do not take with alcohol.  Do not drive after taking this., Disp: 10 tablet, Rfl: 0  •  aspirin (ECOTRIN LOW STRENGTH) 81 mg EC tablet, Take 1 tablet (81 mg total) by mouth daily, Disp:  , Rfl:   •  buPROPion (WELLBUTRIN XL) 150 mg 24 hr tablet, TAKE ONE TABLET BY MOUTH DAILY, Disp: 90 tablet, Rfl: 0  •  cetirizine (ZyrTEC) 10 mg tablet, Take 10 mg by mouth daily, Disp: , Rfl:   •  cholecalciferol (VITAMIN D3) 400 units tablet, Take 400 Units by mouth daily, Disp: , Rfl:   •  Lactobacillus (PROBIOTIC ACIDOPHILUS PO), Take by mouth, Disp: , Rfl:   •  levothyroxine 75 mcg tablet, TAKE ONE TABLET BY MOUTH DAILY, Disp: 90 tablet, Rfl: 0  •  Multiple Vitamin (MULTIVITAMINS PO), Take 1 tablet by mouth daily, Disp: , Rfl:   •  Omega-3 Fatty Acids (OMEGA-3 FISH OIL) 1200 MG CAPS, Take by mouth, Disp: , Rfl:   •  ondansetron (Zofran ODT) 4 mg disintegrating tablet, Take 1 tablet (4 mg total) by mouth every 6 (six) hours as needed for nausea or vomiting, Disp: 20 tablet, Rfl: 0  •  rosuvastatin (CRESTOR) 20 MG tablet, TAKE ONE TABLET BY MOUTH EVERY DAY, Disp: 90 tablet, Rfl: 0  •  vitamin E 100 UNIT capsule, Take 100 Units by mouth daily, Disp: , Rfl:   •  fluticasone (FLONASE) 50 mcg/act nasal spray, , Disp: , Rfl:     Allergies   Allergen Reactions   • Osphena [Ospemifene] Hives   • Food    • Nuts - Food Allergy    • Pollen Extract    • Sesame Seed (Diagnostic) - Food Allergy    • Sulfamethoxazole-Trimethoprim Headache   • Wheat Bran - Food Allergy             Vitals:    08/25/23 1137   BP: 131/74   Pulse: 71       Objective:            Physical Exam  Physical Exam:      General Appearance:    Alert, cooperative, no distress, appears stated age   Head:    Normocephalic, without obvious abnormality, atraumatic   Eyes:    conjunctiva/corneas clear, both eyes         Nose:   Nares normal, septum midline, no drainage    Throat:   Lips normal; teeth and gums normal   Neck:    symmetrical, trachea midline, ;     thyroid:  no enlargement/   Back:     Symmetric, no curvature, ROM normal   Lungs:   No audible wheezing or labored breathing   Chest Wall:    No tenderness or deformity    Heart:    Regular rate and rhythm                         Pulses:   2+ and symmetric all extremities   Skin:   Skin color, texture, turgor normal, no rashes or lesions   Neurologic:   normal strength, sensation and reflexes     throughout                       Ortho Exam  Left knee  Skin intact   No erythema   Full range of motion   TTP hamstring   Pain at posterior lateral aspect of the distal thigh with flexion against resistance  Medial and lateral joint line tenderness to palpation  Neurovascularly Intact Distally     Diagnostics, reviewed and taken today if performed as documented:     The attending physician has personally reviewed the pertinent films in PACS and interpretation is as follows:x-ray left knee demonstrates some subchondral sclerosis. Otherwise no acute fractures or dislocations      Procedures, if performed today:    Procedures    None performed      Scribe Attestation    I,:  Janis Fleming am acting as a scribe while in the presence of the attending physician.:       I,:  Roseanne Bales MD personally performed the services described in this documentation    as scribed in my presence.:             Portions of the record may have been created with voice recognition software. Occasional wrong word or "sound a like" substitutions may have occurred due to the inherent limitations of voice recognition software. Read the chart carefully and recognize, using context, where substitutions have occurred.

## 2023-08-28 ENCOUNTER — TELEPHONE (OUTPATIENT)
Age: 58
End: 2023-08-28

## 2023-08-28 NOTE — TELEPHONE ENCOUNTER
Caller: Patient    Doctor/Office: Korina    CB#: [phone]    Work or school note: Work    Return to work/school date: 8/28/23    Fax #: [phone]    Is there any restrictions that need to be updated? If so, what? Patient needs excuse note stating she was in office 8/25/23 and there are no restrictions to return to work 8/28/23.  Please contact patient to confirm note was faxed

## 2023-08-30 NOTE — TELEPHONE ENCOUNTER
Caller: Patient    Doctor: Dr. Fabian Pastor    Reason for call: Patient calling asking if work note can be re-faxed to number below. Patient stating work someone may have misplaced the fax.       Fax: [phone]    Call back#: [phone]

## 2023-08-31 NOTE — TELEPHONE ENCOUNTER
Caller: Patient    Doctor: Jacquelyn De La Cruz    Reason for call:     Patient stating she still did not receive the work note, refaxed to [phone]. Completed.     Call back#: n/a

## 2023-09-12 ENCOUNTER — TELEPHONE (OUTPATIENT)
Age: 58
End: 2023-09-12

## 2023-10-01 DIAGNOSIS — E78.00 HYPERCHOLESTEROLEMIA: ICD-10-CM

## 2023-10-01 DIAGNOSIS — F33.1 MODERATE EPISODE OF RECURRENT MAJOR DEPRESSIVE DISORDER (HCC): ICD-10-CM

## 2023-10-02 RX ORDER — BUPROPION HYDROCHLORIDE 150 MG/1
TABLET ORAL
Qty: 90 TABLET | Refills: 0 | Status: SHIPPED | OUTPATIENT
Start: 2023-10-02

## 2023-10-02 RX ORDER — ROSUVASTATIN CALCIUM 20 MG/1
TABLET, COATED ORAL
Qty: 90 TABLET | Refills: 0 | Status: SHIPPED | OUTPATIENT
Start: 2023-10-02

## 2023-10-13 ENCOUNTER — OFFICE VISIT (OUTPATIENT)
Dept: INTERNAL MEDICINE CLINIC | Facility: CLINIC | Age: 58
End: 2023-10-13
Payer: COMMERCIAL

## 2023-10-13 VITALS
BODY MASS INDEX: 28.69 KG/M2 | WEIGHT: 172.2 LBS | HEART RATE: 69 BPM | HEIGHT: 65 IN | OXYGEN SATURATION: 99 % | DIASTOLIC BLOOD PRESSURE: 78 MMHG | SYSTOLIC BLOOD PRESSURE: 122 MMHG

## 2023-10-13 DIAGNOSIS — K57.92 DIVERTICULITIS: ICD-10-CM

## 2023-10-13 PROCEDURE — 99213 OFFICE O/P EST LOW 20 MIN: CPT | Performed by: INTERNAL MEDICINE

## 2023-10-13 RX ORDER — CIPROFLOXACIN 500 MG/1
500 TABLET, FILM COATED ORAL EVERY 12 HOURS SCHEDULED
Qty: 14 TABLET | Refills: 0 | Status: SHIPPED | OUTPATIENT
Start: 2023-10-13 | End: 2023-10-20

## 2023-10-13 RX ORDER — METRONIDAZOLE 500 MG/1
500 TABLET ORAL EVERY 8 HOURS SCHEDULED
Qty: 21 TABLET | Refills: 0 | Status: SHIPPED | OUTPATIENT
Start: 2023-10-13 | End: 2023-10-20

## 2023-10-13 NOTE — PROGRESS NOTES
Assessment/Plan:  Recurrent diverticulitis  Continue APAP /ibuprofen PRN  Continue low residue diet and stay hydrated  Advance diet as tolerated  She will hold off on taking the antibiotics unless she is not better in the next 2-3 days       Problem List Items Addressed This Visit    None  Visit Diagnoses     Diverticulitis        Relevant Medications    ciprofloxacin (CIPRO) 500 mg tablet    metroNIDAZOLE (FLAGYL) 500 mg tablet            Subjective:      Patient ID: Eduin Posey is a 59 y.o. female. HPI  Started with LLQ pain 2 days ago. Likely trigger is eating popcorn  Known recurrent diverticulitis, last episode was in January but she did not need an antibiotic  Denies fever, chills  +abdominal bloating  Denies vomiting constipation diarrhea but stool is not normal for her  She is taking APAP and ibuprofen PRN which help    The following portions of the patient's history were reviewed and updated as appropriate: allergies, current medications, past family history, past medical history, past social history, past surgical history, and problem list.    Review of Systems   Constitutional:  Negative for fever. Gastrointestinal:  Positive for abdominal distention, abdominal pain and nausea. Negative for constipation, diarrhea and vomiting. Genitourinary:  Negative for difficulty urinating and dysuria. Objective:      /78 (BP Location: Left arm, Patient Position: Sitting, Cuff Size: Standard)   Pulse 69   Ht 5' 5" (1.651 m)   Wt 78.1 kg (172 lb 3.2 oz)   SpO2 99%   BMI 28.66 kg/m²          Physical Exam  Constitutional:       Appearance: She is not ill-appearing. Cardiovascular:      Rate and Rhythm: Regular rhythm. Heart sounds: Normal heart sounds. Pulmonary:      Breath sounds: Normal breath sounds. Abdominal:      Palpations: Abdomen is soft. Tenderness: There is abdominal tenderness in the left lower quadrant. There is no right CVA tenderness or left CVA tenderness. Psychiatric:         Mood and Affect: Mood normal.         Behavior: Behavior normal.

## 2023-10-13 NOTE — LETTER
October 13, 2023     Patient: Shanelle Will  YOB: 1965  Date of Visit: 10/13/2023      To Whom it May Concern:    Kristan Hrutado is under my professional care. Jenny Santos was seen in my office on 10/13/2023. Jenny Santos may return to work on 10/17/2023. Please excuse from work 10/13-10-16/2023 Dx acute diverticulitis . If you have any questions or concerns, please don't hesitate to call.          Sincerely,          Zohra Sotelo MD        CC: No Recipients

## 2023-10-16 ENCOUNTER — TELEPHONE (OUTPATIENT)
Age: 58
End: 2023-10-16

## 2023-10-16 NOTE — TELEPHONE ENCOUNTER
Patient would like to stay out of work for a few more day as she is still not feeling up to working. Will call on Thursday to discuss updating the return to work note.

## 2023-10-23 ENCOUNTER — TELEPHONE (OUTPATIENT)
Age: 58
End: 2023-10-23

## 2023-11-28 ENCOUNTER — APPOINTMENT (OUTPATIENT)
Dept: RADIOLOGY | Facility: OTHER | Age: 58
End: 2023-11-28
Payer: COMMERCIAL

## 2023-11-28 ENCOUNTER — OFFICE VISIT (OUTPATIENT)
Dept: OBGYN CLINIC | Facility: OTHER | Age: 58
End: 2023-11-28
Payer: COMMERCIAL

## 2023-11-28 VITALS
HEIGHT: 65 IN | HEART RATE: 73 BPM | WEIGHT: 176 LBS | SYSTOLIC BLOOD PRESSURE: 116 MMHG | DIASTOLIC BLOOD PRESSURE: 67 MMHG | BODY MASS INDEX: 29.32 KG/M2

## 2023-11-28 DIAGNOSIS — M25.511 ACUTE PAIN OF RIGHT SHOULDER: ICD-10-CM

## 2023-11-28 DIAGNOSIS — M75.81 TENDONITIS OF BOTH ROTATOR CUFFS: Primary | ICD-10-CM

## 2023-11-28 DIAGNOSIS — M75.82 TENDONITIS OF BOTH ROTATOR CUFFS: Primary | ICD-10-CM

## 2023-11-28 DIAGNOSIS — M75.52 SUBACROMIAL BURSITIS OF BOTH SHOULDERS: ICD-10-CM

## 2023-11-28 DIAGNOSIS — M25.512 ACUTE PAIN OF LEFT SHOULDER: ICD-10-CM

## 2023-11-28 DIAGNOSIS — M75.51 SUBACROMIAL BURSITIS OF BOTH SHOULDERS: ICD-10-CM

## 2023-11-28 PROCEDURE — 99204 OFFICE O/P NEW MOD 45 MIN: CPT | Performed by: ORTHOPAEDIC SURGERY

## 2023-11-28 PROCEDURE — 20610 DRAIN/INJ JOINT/BURSA W/O US: CPT | Performed by: ORTHOPAEDIC SURGERY

## 2023-11-28 PROCEDURE — 73030 X-RAY EXAM OF SHOULDER: CPT

## 2023-11-28 RX ORDER — BETAMETHASONE SODIUM PHOSPHATE AND BETAMETHASONE ACETATE 3; 3 MG/ML; MG/ML
6 INJECTION, SUSPENSION INTRA-ARTICULAR; INTRALESIONAL; INTRAMUSCULAR; SOFT TISSUE
Status: COMPLETED | OUTPATIENT
Start: 2023-11-28 | End: 2023-11-28

## 2023-11-28 RX ORDER — BUPIVACAINE HYDROCHLORIDE 2.5 MG/ML
2 INJECTION, SOLUTION INFILTRATION; PERINEURAL
Status: COMPLETED | OUTPATIENT
Start: 2023-11-28 | End: 2023-11-28

## 2023-11-28 RX ADMIN — BUPIVACAINE HYDROCHLORIDE 2 ML: 2.5 INJECTION, SOLUTION INFILTRATION; PERINEURAL at 14:15

## 2023-11-28 RX ADMIN — BETAMETHASONE SODIUM PHOSPHATE AND BETAMETHASONE ACETATE 6 MG: 3; 3 INJECTION, SUSPENSION INTRA-ARTICULAR; INTRALESIONAL; INTRAMUSCULAR; SOFT TISSUE at 14:15

## 2023-11-28 NOTE — PATIENT INSTRUCTIONS
CORTICOSTEROID INJECTION  What is a corticosteroid? Injuries or disease such as arthritis, bursitis or tendonitis result in inflammation. In turn, this inflammation can cause swelling and pain. A local injection of a corticosteroid is provided to diminish inflammation. By doing so, it will also decrease pain and swelling which is making you uncomfortable. Is this the same thing as a Cortisone Injection? Cortisone® is a brand name of a corticosteroid used commonly in the past.  Today I commonly use a more water-soluble corticosteroid named Celestone®. Will the injection hurt? As with any injection, you may feel pain at the time of the injection. Typically, I use a local anesthetic (Juris Maiers) in addition to the corticosteroid to determine if the injection has been placed in the appropriate location. Hence it is important to monitor your symptoms 4-6 hours after the injection, as the area will be anesthetized (numb) while the local anesthetic is working. Once the local anesthetic wears off, the intensity of pain can be the same as it was prior to the injection, or even worse. This does not mean that the injection is not working. The corticosteroid may take 24-72 hours to begin having a positive effect. If you do experience an increase in pain, the use of an ice pack on the area for 20 minutes at a time should help. It is also helpful to take an oral anti-inflammatory such as Tylenol® or Motrin® if you are able to medically do so. For this reason it is best to avoid activities that put stress on the area the first 24 hours after the injection. How long will pain relief last?  This will vary according to the type and severity of the symptoms being treated and the severity of the condition. Symptom relief may last weeks to months. I typically couple injections with physical therapy so that the underlying problem causing the inflammation may be treated as the pain diminishes.   If the combination is not successful, you may be a surgical candidate. I have read bad things about steroids. Will these things happen to me? Corticosteroids, when utilized properly, are safe and effective drugs. When used in a low dose, potential adverse reactions are very rare. Some patients may experience a sensation of flushing for several days. Very rarely, there can be a local reaction which may include increased discomfort for a period of time in the areas that has been injected. A steroid should not be used over and over again. Multiple injections in the same area can produce adverse effects such as tissue atrophy and degeneration of tendon or cartilage. A small percentage of patients (< 0.1%) may develop an infection in the joint after injection. This is a treatable problem, but if neglected, may result in permanent disability. Signs of infection include redness, swelling, discharge, fevers, increasing pain and drainage from the injection site. This represents an emergency and you should contact our office immediately or seek treatment in the ER if after hours. If I have diabetes, will this injection affect me? If you are diabetic, an injection of a corticosteroid can raise your blood sugar level, requiring more insulin for a brief period of time. This may necessitate careful blood sugar maintenance. If the elevated sugars are not able to be controlled, contact your diabetic doctor for guidance.

## 2023-11-28 NOTE — PROGRESS NOTES
Assessment  Diagnoses and all orders for this visit:    Tendonitis of both rotator cuffs    Subacromial bursitis of both shoulders      Discussion and Plan:    The patient has an examination consistent with subacromial impingement syndrome of the bilateral shoulder. I have discussed with the patient the pathophysiology of this diagnosis and reviewed how the examination correlates with this diagnosis. Treatment options were discussed at length and after discussing these treatment options, the patient elected for and received a subacromial injection of corticosteroid (as described in the procedure note) with a prescription for referral to physical therapy. We will reevaluate the patient in 6-8 weeks. If the symptoms fail to improve with this treatment the patient would be indicated for further imaging in the form of an MRI scan of the shoulder. Subjective:   Patient ID: Mata Ga is a 59 y.o. female      HPI  The patient presents with a chief complaint of bilateral shoulder pain. The pain began 2 month(s) ago and is not associated with an acute injury. The patient describes the pain as aching, dull, and sharp in intensity,  intermittent in timing, and localizes the pain to the  right subacromial joint. The pain is worse with movement and raising arm over head and relieved by rest.  The pain is not associated with numbness and tingling. The pain is not associated with constitutional symptoms. The patient is not awoken at night by the pain. The patient words on [facility] at the hospital.  She was provided with a physical therapy directed HEP which has failed to provided relief.         The following portions of the patient's history were reviewed and updated as appropriate: allergies, current medications, past family history, past medical history, past social history, past surgical history and problem list.        Objective:  /67   Pulse 73   Ht 5' 5" (1.651 m)   Wt 79.8 kg (176 lb)   BMI 29.29 kg/m²       Right Shoulder Exam     Range of Motion   External rotation:  70   Forward flexion:  170   Internal rotation 0 degrees:  Lumbar     Muscle Strength   Abduction: 5/5   External rotation: 4/5     Tests   Cabrales test: positive  Impingement: positive  Drop arm: positive    Other   Erythema: absent  Sensation: normal  Pulse: present      Left Shoulder Exam     Range of Motion   External rotation:  80   Forward flexion:  170   Internal rotation 0 degrees:  Lumbar     Muscle Strength   Abduction: 5/5   External rotation: 4/5     Tests   Cabrales test: positive  Impingement: positive  Drop arm: positive    Other   Erythema: absent  Sensation: normal  Pulse: present             Physical Exam  Vitals reviewed. Constitutional:       Appearance: She is well-developed. Eyes:      Pupils: Pupils are equal, round, and reactive to light. Pulmonary:      Effort: Pulmonary effort is normal.      Breath sounds: Normal breath sounds. Abdominal:      General: Abdomen is flat. There is no distension. Skin:     General: Skin is warm and dry. Neurological:      Mental Status: She is alert and oriented to person, place, and time. Psychiatric:         Behavior: Behavior normal.         Thought Content: Thought content normal.         Judgment: Judgment normal.       Large joint arthrocentesis: bilateral glenohumeral  Universal Protocol:  Consent: Verbal consent obtained.   Consent given by: parent  Patient understanding: patient states understanding of the procedure being performed  Site marked: the operative site was marked  Supporting Documentation  Indications: pain   Procedure Details  Location: shoulder - bilateral glenohumeral  Preparation: Patient was prepped and draped in the usual sterile fashion  Needle size: 22 G  Ultrasound guidance: no  Approach: posterior    Medications (Right): 2 mL bupivacaine 0.25 %; 6 mg betamethasone acetate-betamethasone sodium phosphate 6 (3-3) mg/mLMedications (Left): 2 mL bupivacaine 0.25 %; 6 mg betamethasone acetate-betamethasone sodium phosphate 6 (3-3) mg/mL   Patient tolerance: patient tolerated the procedure well with no immediate complications  Dressing:  Sterile dressing applied           I have personally reviewed pertinent films in PACS and my interpretation is as follows. Bilateral shoulder x-rays demonstrates no fracture or dislocation.     Scribe Attestation      I,:  Latasha Weiner am acting as a scribe while in the presence of the attending physician.:       I,:  Jeannette Walker MD personally performed the services described in this documentation    as scribed in my presence.:

## 2023-12-04 DIAGNOSIS — E03.9 HYPOTHYROIDISM, UNSPECIFIED TYPE: ICD-10-CM

## 2023-12-04 RX ORDER — LEVOTHYROXINE SODIUM 0.07 MG/1
TABLET ORAL
Qty: 90 TABLET | Refills: 0 | Status: SHIPPED | OUTPATIENT
Start: 2023-12-04

## 2023-12-18 ENCOUNTER — EVALUATION (OUTPATIENT)
Dept: PHYSICAL THERAPY | Facility: CLINIC | Age: 58
End: 2023-12-18
Payer: COMMERCIAL

## 2023-12-18 DIAGNOSIS — M75.82 TENDONITIS OF BOTH ROTATOR CUFFS: Primary | ICD-10-CM

## 2023-12-18 DIAGNOSIS — M54.12 CERVICAL RADICULOPATHY: ICD-10-CM

## 2023-12-18 DIAGNOSIS — M75.51 SUBACROMIAL BURSITIS OF BOTH SHOULDERS: ICD-10-CM

## 2023-12-18 DIAGNOSIS — M75.81 TENDONITIS OF BOTH ROTATOR CUFFS: Primary | ICD-10-CM

## 2023-12-18 DIAGNOSIS — M75.52 SUBACROMIAL BURSITIS OF BOTH SHOULDERS: ICD-10-CM

## 2023-12-18 PROCEDURE — 97112 NEUROMUSCULAR REEDUCATION: CPT | Performed by: PHYSICAL THERAPIST

## 2023-12-18 PROCEDURE — 97161 PT EVAL LOW COMPLEX 20 MIN: CPT | Performed by: PHYSICAL THERAPIST

## 2023-12-18 PROCEDURE — 97110 THERAPEUTIC EXERCISES: CPT | Performed by: PHYSICAL THERAPIST

## 2023-12-18 NOTE — PROGRESS NOTES
PT Evaluation     Today's date: 2023  Patient name: Estefani Olmstead  : 1965  MRN: [id number]  Referring provider: Sami Damon*  Dx:   Encounter Diagnosis     ICD-10-CM    1. Tendonitis of both rotator cuffs  M75.81 Ambulatory Referral to Physical Therapy    M75.82       2. Subacromial bursitis of both shoulders  M75.51 Ambulatory Referral to Physical Therapy    M75.52                      Assessment  Assessment details: Pt presents with signs and symptoms synonymous of admitting diagnosis as well as possible outstanding and residual symptoms from cervical radiculopathy.  She demonstrated a mechanical diagnosis of cervical derangement with DP of CS retraction + pt OP.  Pt presents with pain, decreased strength, decreased range, flexibility, as well as tolerance to activity and postural awareness.  Pt would benefit skilled PT intervention in order to address these impairments in order to be able to perform all desired activities with minimal to nil symptom exacerbation.  Thank you very much for this kind and motivated referral.     Impairments: abnormal or restricted ROM, activity intolerance, difficulty understanding, impaired physical strength, lacks appropriate home exercise program, pain with function, poor posture  and poor body mechanics  Understanding of Dx/Px/POC: good   Prognosis: good    Goals  STG 4 Weeks:  Decrease pain at worst to 3  Improve range to improve CS range to min  Improve strength to improve UE strength to 4.  Independent with HEP  LTG 8 Weeks:  Decrease pain at worst to 1  Improve range to improve CS range to nil  Improve strength to UE 5.    Able to perform all desired activities with minimal to nil symptom exacerbation      Plan  Patient would benefit from: skilled physical therapy  Planned modality interventions: cryotherapy and thermotherapy: hydrocollator packs  Planned therapy interventions: joint mobilization, manual therapy, neuromuscular re-education, patient  education, postural training, strengthening, stretching, therapeutic activities, therapeutic exercise, therapeutic training, transfer training, IADL retraining, home exercise program, graded motor, graded exercise, functional ROM exercises, flexibility, abdominal trunk stabilization, activity modification, balance, behavior modification and body mechanics training  Frequency: 2x week  Duration in weeks: 10  Treatment plan discussed with: patient      Subjective Evaluation    History of Present Illness  Date of onset: 12/18/2023  Mechanism of injury: Pt is a 58 yofemale who is RHD and presents today stating that she developed 1-2 months ago she had insidious onset on shoulder R >L, she noticed this in particular with watching television and there would be a stable ache on the front and deep into the shoulder.  This also occurred with sleeping, she is a back sleeper, but if she rolled to one side more than the other.  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  She chose to meet with Dr. Damon on 11/28/28, she had x-ray performed, (-) of fracture of abnormality.  Pt reports that he offered injection and this was given and pt has found improvement since then.  Pt reports that now currently pain at worst can be at worst 4/10 with reaching behind car seat, can occur not as frequently with sitting watching television.  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  Pt indicates that she is getting occasional pain in her neck with Upper trap with turning, reaching, lifting.  Pt reports that she has had neck pain since she was in her 20's, has seen Chiro for this.  She had a history of being a .  Contributes it to this.  Pt reports no difficulty falling asleep and was but no longer  awakening due to pain.  Reports few HA's a month, usually focused behind her eyes.  Contributes this to glasses.  Pt report no change in bowel or bladder, and her goals are to reduce residual pain, improve reaching, improve lifting, and get back to how she was prior to insidious onset of B/L Shoulder pain.    Quality of life: good    Patient Goals  Patient goals for therapy: increased strength, return to sport/leisure activities, increased motion and decreased pain    Pain  Current pain ratin  At best pain ratin  At worst pain ratin (neck pain and shoulder pain.)  Quality: dull ache, tight and sharp  Relieving factors: change in position and heat  Aggravating factors: keyboarding, overhead activity, sitting and lifting  Progression: improved      Diagnostic Tests  X-ray: normal  Treatments  Previous treatment: injection treatment      Objective     Active Range of Motion   Cervical/Thoracic Spine       Cervical  Subcranial protraction:   Restriction level: minimal  Subcranial retraction:   Restriction level: moderate  Flexion:  Restriction level: minimal  Extension:  Restriction level: moderate  Left lateral flexion:  Restriction level: moderate  Right lateral flexion:  with pain Restriction level moderate  Left rotation:  with pain Restriction level: moderate  Right rotation:  with pain Restriction level: moderate    Additional Active Range of Motion Details  Forward head, rounded shoulders  B/L Sensation intact to light touch C3,4,5,6,7,8,T1,T2  DTR Bi Tri Brac 1+ all.   Judd (-) B/L  Postural correction   UE Screen WFL range, slow, painless,   Strength strong and painless except ER 3+* B/L.   Repeated motion   Flex  Retraction Ret, NB.  Ret + Pt OP.  D B      (improved CS range, improve NLTT testing on L, improved shoulder strength ER to 5/5.).    Ext  SB R  SB L  Joint Mobility  Palpation  Sts NLTT + L.           Flowsheet Rows      Flowsheet Row Most Recent Value   PT/OT G-Codes    Current Score  "66   Projected Score 74               Precautions: Hypothyroidism       Manuals 12/18                                                                Neuro Re-Ed             Postural Education and progression 10 min            Bank Account/Cut Finger/Obstruction education analogy. perform                                                                             Ther Ex             CS Retraction NB 20x            CS Ret + Pt OP D B 4 x 5            Progress to Ret Pt OP + Ext Nv if ready.                           Other progression?                                       NLTT L  1\"           Ther Activity             CS Range Rot/Ext B            ER Strength 3+* to 5/5.             NLTT L Better                         Reaching behind in car Test nv?            Modalities             CP/HP PRN.                                "

## 2023-12-25 DIAGNOSIS — E78.00 HYPERCHOLESTEROLEMIA: ICD-10-CM

## 2023-12-25 DIAGNOSIS — F33.1 MODERATE EPISODE OF RECURRENT MAJOR DEPRESSIVE DISORDER (HCC): ICD-10-CM

## 2023-12-27 ENCOUNTER — OFFICE VISIT (OUTPATIENT)
Dept: PHYSICAL THERAPY | Facility: CLINIC | Age: 58
End: 2023-12-27
Payer: COMMERCIAL

## 2023-12-27 DIAGNOSIS — M54.12 CERVICAL RADICULOPATHY: ICD-10-CM

## 2023-12-27 DIAGNOSIS — M75.52 SUBACROMIAL BURSITIS OF BOTH SHOULDERS: ICD-10-CM

## 2023-12-27 DIAGNOSIS — M75.51 SUBACROMIAL BURSITIS OF BOTH SHOULDERS: ICD-10-CM

## 2023-12-27 DIAGNOSIS — M75.81 TENDONITIS OF BOTH ROTATOR CUFFS: Primary | ICD-10-CM

## 2023-12-27 DIAGNOSIS — M75.82 TENDONITIS OF BOTH ROTATOR CUFFS: Primary | ICD-10-CM

## 2023-12-27 PROCEDURE — 97110 THERAPEUTIC EXERCISES: CPT

## 2023-12-27 PROCEDURE — 97112 NEUROMUSCULAR REEDUCATION: CPT

## 2023-12-27 PROCEDURE — 97530 THERAPEUTIC ACTIVITIES: CPT

## 2023-12-27 RX ORDER — ROSUVASTATIN CALCIUM 20 MG/1
TABLET, COATED ORAL
Qty: 90 TABLET | Refills: 0 | Status: SHIPPED | OUTPATIENT
Start: 2023-12-27

## 2023-12-27 RX ORDER — BUPROPION HYDROCHLORIDE 150 MG/1
TABLET ORAL
Qty: 90 TABLET | Refills: 0 | Status: SHIPPED | OUTPATIENT
Start: 2023-12-27

## 2023-12-27 NOTE — PROGRESS NOTES
"Daily Note     Today's date: 2023  Patient name: Estefani Olmstead  : 1965  MRN: [id number]  Referring provider: Sami Damon*  Dx:   Encounter Diagnosis     ICD-10-CM    1. Tendonitis of both rotator cuffs  M75.81     M75.82       2. Subacromial bursitis of both shoulders  M75.51     M75.52       3. Cervical radiculopathy  M54.12                      Subjective: Pt reports that she feels about the same following the IE, compliant with HEP.       Objective: See treatment diary below      Assessment: Tolerated treatment well. Responded well to CS ret+ext and will trial this at home. Patient would benefit from continued PT      Plan: Continue per plan of care.      Precautions: Hypothyroidism       Manuals                                                                Neuro Re-Ed             Postural Education and progression 10 min 10 min           Bank Account/Cut Finger/Obstruction education analogy. perform                                                                             Ther Ex             CS Retraction NB 20x            CS Ret + Pt OP D B 4 x 5 4x5            Progress to Ret Pt OP + Ext Nv if ready.   5x                         Other progression?                                       NLTT L  1\"x10            Ther Activity             CS Range Rot/Ext B B           ER Strength 3+* to 5/5.  B           NLTT L Better B                        Reaching behind in car Test nv?            Modalities             CP/HP PRN.                                "

## 2023-12-28 ENCOUNTER — TELEPHONE (OUTPATIENT)
Age: 58
End: 2023-12-28

## 2023-12-28 NOTE — TELEPHONE ENCOUNTER
Caller: patient    Doctor: Marisol    Reason for call: pt calling to verify her appt date and time    Call back#:

## 2024-01-03 ENCOUNTER — OFFICE VISIT (OUTPATIENT)
Dept: PHYSICAL THERAPY | Facility: CLINIC | Age: 59
End: 2024-01-03
Payer: COMMERCIAL

## 2024-01-03 DIAGNOSIS — M75.51 SUBACROMIAL BURSITIS OF BOTH SHOULDERS: ICD-10-CM

## 2024-01-03 DIAGNOSIS — M75.81 TENDONITIS OF BOTH ROTATOR CUFFS: Primary | ICD-10-CM

## 2024-01-03 DIAGNOSIS — M54.12 CERVICAL RADICULOPATHY: ICD-10-CM

## 2024-01-03 DIAGNOSIS — M75.82 TENDONITIS OF BOTH ROTATOR CUFFS: Primary | ICD-10-CM

## 2024-01-03 DIAGNOSIS — M75.52 SUBACROMIAL BURSITIS OF BOTH SHOULDERS: ICD-10-CM

## 2024-01-03 PROCEDURE — 97110 THERAPEUTIC EXERCISES: CPT

## 2024-01-03 NOTE — PROGRESS NOTES
"Daily Note     Today's date: 1/3/2024  Patient name: Estfeani Olmstead  : 1965  MRN: [id number]  Referring provider: Sami Damon*  Dx:   Encounter Diagnosis     ICD-10-CM    1. Tendonitis of both rotator cuffs  M75.81     M75.82       2. Subacromial bursitis of both shoulders  M75.51     M75.52       3. Cervical radiculopathy  M54.12           Start Time: 1600  Stop Time: 1630  Total time in clinic (min): 30 minutes    Subjective: Pt reports decreased frequency of symptoms since starting PT. 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       Objective: See treatment diary below      Assessment: Tolerated treatment well. Responded best again to CS ret+ext  Patient would benefit from continued PT      Plan: Continue per plan of care.      Precautions: Hypothyroidism       Manuals                                                               Neuro Re-Ed             Postural Education and progression 10 min 10 min           Bank Account/Cut Finger/Obstruction education analogy. perform  5 min                                                                            Ther Ex             CS Retraction NB 20x            CS Ret + Pt OP D B 4 x 5 4x5  5x5          Progress to Ret Pt OP + Ext Nv if ready.   5x  3x5                        Other progression?                                       NLTT L  1\"x10  1\"x10           Ther Activity             CS Range Rot/Ext B B B          ER Strength 3+* to 5/5.  B           NLTT L Better B B                       Reaching behind in car Test nv?            Modalities             CP/HP PRN.                                "

## 2024-01-10 ENCOUNTER — OFFICE VISIT (OUTPATIENT)
Dept: PHYSICAL THERAPY | Facility: CLINIC | Age: 59
End: 2024-01-10
Payer: COMMERCIAL

## 2024-01-10 DIAGNOSIS — M54.12 CERVICAL RADICULOPATHY: ICD-10-CM

## 2024-01-10 DIAGNOSIS — M75.82 TENDONITIS OF BOTH ROTATOR CUFFS: Primary | ICD-10-CM

## 2024-01-10 DIAGNOSIS — M75.52 SUBACROMIAL BURSITIS OF BOTH SHOULDERS: ICD-10-CM

## 2024-01-10 DIAGNOSIS — M75.81 TENDONITIS OF BOTH ROTATOR CUFFS: Primary | ICD-10-CM

## 2024-01-10 DIAGNOSIS — M75.51 SUBACROMIAL BURSITIS OF BOTH SHOULDERS: ICD-10-CM

## 2024-01-10 PROCEDURE — 97110 THERAPEUTIC EXERCISES: CPT

## 2024-01-10 PROCEDURE — 97112 NEUROMUSCULAR REEDUCATION: CPT

## 2024-01-10 NOTE — PROGRESS NOTES
"Daily Note     Today's date: 1/10/2024  Patient name: Estefani Olmstead  : 1965  MRN: [id number]  Referring provider: Sami Damon*  Dx:   Encounter Diagnosis     ICD-10-CM    1. Tendonitis of both rotator cuffs  M75.81     M75.82       2. Subacromial bursitis of both shoulders  M75.51     M75.52       3. Cervical radiculopathy  M54.12           Start Time: 1600  Stop Time: 1644  Total time in clinic (min): 44 minutes    Subjective: Pt reports that she notices less frequent episodes of pain  and less intense symptoms since beginning PT. She did some shoveling and did have to take a tylenol yesterday.       Objective: See treatment diary below      Assessment: Tolerated treatment well. Still responding to extension DP and this has improved her symptoms and cervical rotation. Anticipate progression of TB postural strengthening NV. Patient would benefit from continued PT      Plan: Continue per plan of care.      Precautions: Hypothyroidism       Manuals 12/18 12/27 01/03 01/10                                                             Neuro Re-Ed             Postural Education and progression 10 min 10 min           Bank Account/Cut Finger/Obstruction education analogy. perform  5 min  8 min          TB row/ext    Nv?                                                             Ther Ex             CS Retraction NB 20x            CS Ret + Pt OP D B 4 x 5 4x5  5x5 5x5         Progress to Ret Pt OP + Ext Nv if ready.   5x  3x5  3x5          CS ret+ext+Op    5x          Other progression?                                       NLTT L  1\"x10  1\"x10  1\"x10          Ther Activity             CS Range Rot/Ext B B B          ER Strength 3+* to 5/5.  B           NLTT L Better B B B                      Reaching behind in car Test nv?            Modalities             CP/HP PRN.                                "

## 2024-01-15 ENCOUNTER — OFFICE VISIT (OUTPATIENT)
Dept: OBGYN CLINIC | Facility: OTHER | Age: 59
End: 2024-01-15
Payer: COMMERCIAL

## 2024-01-15 VITALS
WEIGHT: 176 LBS | HEIGHT: 65 IN | DIASTOLIC BLOOD PRESSURE: 62 MMHG | BODY MASS INDEX: 29.32 KG/M2 | HEART RATE: 76 BPM | SYSTOLIC BLOOD PRESSURE: 121 MMHG

## 2024-01-15 DIAGNOSIS — M75.81 TENDONITIS OF BOTH ROTATOR CUFFS: ICD-10-CM

## 2024-01-15 DIAGNOSIS — M75.52 SUBACROMIAL BURSITIS OF BOTH SHOULDERS: Primary | ICD-10-CM

## 2024-01-15 DIAGNOSIS — M75.51 SUBACROMIAL BURSITIS OF BOTH SHOULDERS: Primary | ICD-10-CM

## 2024-01-15 DIAGNOSIS — M75.82 TENDONITIS OF BOTH ROTATOR CUFFS: ICD-10-CM

## 2024-01-15 PROCEDURE — 99213 OFFICE O/P EST LOW 20 MIN: CPT | Performed by: PHYSICIAN ASSISTANT

## 2024-01-15 NOTE — PROGRESS NOTES
"  Assessment  Diagnoses and all orders for this visit:    Subacromial bursitis of both shoulders    Tendonitis of both rotator cuffs        Discussion and Plan:    Estefani continues to be symptomatic but it is improved from her IE.  She will continue with formal PT as they plan to focus more on the shoulders in the next few weeks.  Estefani will monitor her symptoms.  If her pain continues to interfere with ADLs, work and activities of enjoyment, she will let me know.  The next step in her care would be to obtain a new MRI of her left shoulder.  Her exam is more consistent with intra-articular pathology such as underlying OA and/or long head bicep pathology.  She has a known articular sided supraspinatus tendon tear on the left but states this pain is different than the pain she had from her rotator cuff injury 10 years ago.    Follow up: PRN    Subjective:   Patient ID: Estefani Olmstead is a 58 y.o. female    Estefani presents to the office in follow up of the bilateral shoulder impingement.  She had bilateral steroid injections at last visit as well as referral to PT.  She reports some improvement with the steroid injections.  Pain used to be more constant and now it's more intermittent.  Reports to some improvement with PT but they have been focusing on her neck mostly.  Pain occurs with certain motions.  Pain improves with rest.  Estefani localizes pain to glenohumeral joint.  Pain does not radiate past the elbows.  Left shoulder is more painful than the right.  Denies new injury or trauma.      The following portions of the patient's history were reviewed and updated as appropriate: allergies, current medications, past family history, past medical history, past social history, past surgical history and problem list.    Objective:  /62 (BP Location: Left arm, Patient Position: Sitting, Cuff Size: Standard)   Pulse 76   Ht 5' 5\" (1.651 m)   Wt 79.8 kg (176 lb)   BMI 29.29 kg/m²       Right Shoulder Exam "     Tenderness   The patient is experiencing no tenderness.    Range of Motion   The patient has normal right shoulder ROM.    Muscle Strength   External rotation: 5/5   Supraspinatus: 5/5     Tests   Cabrales test: negative  Impingement: positive  Drop arm: negative    Other   Erythema: absent  Sensation: normal  Pulse: present    Comments:  Neg speeds  Neg O'Briens      Left Shoulder Exam     Tenderness   The patient is experiencing no tenderness.     Range of Motion   The patient has normal left shoulder ROM.    Muscle Strength   External rotation: 5/5   Supraspinatus: 4/5     Tests   Cabrales test: positive  Cross arm: negative  Impingement: positive  Drop arm: negative    Other   Erythema: absent  Sensation: normal  Pulse: present     Comments:  Positive speeds  Positive O'Briens            Physical Exam  Constitutional:       General: She is not in acute distress.     Appearance: She is well-developed.   HENT:      Head: Normocephalic.   Pulmonary:      Breath sounds: No stridor. No wheezing.   Musculoskeletal:      Cervical back: Normal range of motion.   Skin:     General: Skin is warm and dry.   Neurological:      Mental Status: She is alert and oriented to person, place, and time.   Psychiatric:         Behavior: Behavior normal.         Thought Content: Thought content normal.         Judgment: Judgment normal.

## 2024-01-17 ENCOUNTER — EVALUATION (OUTPATIENT)
Dept: PHYSICAL THERAPY | Facility: CLINIC | Age: 59
End: 2024-01-17
Payer: COMMERCIAL

## 2024-01-17 DIAGNOSIS — M75.82 TENDONITIS OF BOTH ROTATOR CUFFS: Primary | ICD-10-CM

## 2024-01-17 DIAGNOSIS — M75.81 TENDONITIS OF BOTH ROTATOR CUFFS: Primary | ICD-10-CM

## 2024-01-17 DIAGNOSIS — M75.51 SUBACROMIAL BURSITIS OF BOTH SHOULDERS: ICD-10-CM

## 2024-01-17 DIAGNOSIS — M75.52 SUBACROMIAL BURSITIS OF BOTH SHOULDERS: ICD-10-CM

## 2024-01-17 DIAGNOSIS — M54.12 CERVICAL RADICULOPATHY: ICD-10-CM

## 2024-01-17 PROCEDURE — 97110 THERAPEUTIC EXERCISES: CPT | Performed by: PHYSICAL THERAPIST

## 2024-01-17 PROCEDURE — 97112 NEUROMUSCULAR REEDUCATION: CPT | Performed by: PHYSICAL THERAPIST

## 2024-01-17 NOTE — PROGRESS NOTES
PT Evaluation     Today's date: 2024  Patient name: Estefani Olmstead  : 1965  MRN: [id number]  Referring provider: Sami Damon*  Dx:   Encounter Diagnosis     ICD-10-CM    1. Tendonitis of both rotator cuffs  M75.81     M75.82       2. Subacromial bursitis of both shoulders  M75.51     M75.52       3. Cervical radiculopathy  M54.12                        Assessment  Assessment details: Pt is progressing very well at this time.  They have demonstrated improvement in pain levels, strength, range, flexibility as well as overall tolerance to activity.  They have achieved all STGs sought out for them as well as by them.  They will benefit continued Skilled PT intervention in order to achieve all LTGs sought out for them as well as by them in order to perform all desired activities with minimal to nil symptom exacerbation.  Primary focus over next 2 visits will be focusing on strengthening and education for prevention and understand of current condition.  Pt was in complete accord.  Thank you very much for this kind and motivated referral.        Impairments: abnormal or restricted ROM, activity intolerance, difficulty understanding, impaired physical strength, lacks appropriate home exercise program, pain with function, poor posture  and poor body mechanics  Understanding of Dx/Px/POC: good   Prognosis: good    Goals  STG 4 Weeks:  Decrease pain at worst to 3 -met  Improve range to improve CS range to min -met  Improve strength to improve UE strength to 4. -met  Independent with HEP -met  LTG 8 Weeks:  Decrease pain at worst to 1  Improve range to improve CS range to nil  Improve strength to UE 5.    Able to perform all desired activities with minimal to nil symptom exacerbation      Plan  Patient would benefit from: skilled physical therapy  Planned modality interventions: cryotherapy and thermotherapy: hydrocollator packs  Planned therapy interventions: joint mobilization, manual therapy,  neuromuscular re-education, patient education, postural training, strengthening, stretching, therapeutic activities, therapeutic exercise, therapeutic training, transfer training, IADL retraining, home exercise program, graded motor, graded exercise, functional ROM exercises, flexibility, abdominal trunk stabilization, activity modification, balance, behavior modification and body mechanics training  Frequency: 2x week  Duration in weeks: 10  Treatment plan discussed with: patient      Subjective Evaluation    History of Present Illness  Date of onset: 2023  Mechanism of injury: Pt presents today stating that as a whole she is improving.  Pt reports that pain at worst has been about 3/10, one incident up to a 6/10.  Pt reports that symptoms to the L shoulder, R shoulder is okay.  Pt reports no headaches at this time.  Pt reports that lifting is going okay.  Pt reports that had follow up with Dr. Damon's office, indicates that if need be there is MRI opportunity, pt desires to perform without this as well as any surgical intervention if possible.  Pt reports that she had 2 injections 1.5 to 2 months ago.  Pt reports goals are to reduce pain, improve strength, endurance perform vocational duties without interruption due to pain, weakness or fatigue.  Pt reports that sleeping has mildly improved but is still not able to sustain laying on back for too long.      Quality of life: good    Patient Goals  Patient goals for therapy: increased strength, return to sport/leisure activities, increased motion and decreased pain    Pain  Current pain ratin  At best pain ratin  At worst pain rating: 3 (neck pain and shoulder pain.)  Quality: dull ache, tight and sharp  Relieving factors: change in position and heat  Aggravating factors: keyboarding, overhead activity, sitting and lifting  Progression: improved      Diagnostic Tests  X-ray: normal  Treatments  Previous treatment: injection treatment      Objective  "    Active Range of Motion   Cervical/Thoracic Spine       Cervical  Subcranial protraction:  WFL   Subcranial retraction:   Restriction level: minimal  Flexion:  Restriction level: minimal  Extension:  Restriction level: minimal  Left lateral flexion:  Restriction level: minimal  Right lateral flexion:  Restriction level minimal  Left rotation:  Restriction level: minimal  Right rotation:  with pain Restriction level: minimal    Additional Active Range of Motion Details  Forward head, rounded shoulders  B/L Sensation intact to light touch C3,4,5,6,7,8,T1,T2  DTR Bi Tri Brac 1+ all.   Judd (-) B/L  Postural correction   UE Screen WFL range, slow, painless,   Strength strong and painless except ER 3+* B/L. 4+.    Repeated motion   Flex  Retraction Ret, NB.  Ret + Pt OP.  D B      (improved CS range, improve NLTT testing on L, improved shoulder strength ER to 5/5.).    Ext  SB R  SB L  Joint Mobility  Palpation  Sts NLTT + L.   (-)               Precautions: Hypothyroidism       Manuals 12/18 12/27 01/03 01/10 1/17                                                            Neuro Re-Ed             Postural Education and progression 10 min 10 min   10 min assessment        Bank Account/Cut Finger/Obstruction education analogy. perform  5 min  8 min          TB row/ext    Nv? nv                                                            Ther Ex             CS Retraction NB 20x            CS Ret + Pt OP D B 4 x 5 4x5  5x5 5x5 5 x 5        Progress to Ret Pt OP + Ext Nv if ready.   5x  3x5  3x5  2 x 5        CS ret+ext+Op    5x  5 x 2        Other progression?                                       NLTT L  1\"x10  1\"x10  1\"x10  1\" x 10        Ther Activity             CS Range Rot/Ext B B B          ER Strength 3+* to 5/5.  B           NLTT L Better B B B B                     Reaching behind in car Test nv?            Modalities             CP/HP PRN.                             "

## 2024-01-22 ENCOUNTER — APPOINTMENT (OUTPATIENT)
Dept: LAB | Facility: CLINIC | Age: 59
End: 2024-01-22
Payer: COMMERCIAL

## 2024-01-22 DIAGNOSIS — E03.9 ACQUIRED HYPOTHYROIDISM: ICD-10-CM

## 2024-01-22 DIAGNOSIS — R73.01 IMPAIRED FASTING BLOOD SUGAR: ICD-10-CM

## 2024-01-22 LAB
EST. AVERAGE GLUCOSE BLD GHB EST-MCNC: 134 MG/DL
HBA1C MFR BLD: 6.3 %
T4 FREE SERPL-MCNC: 0.77 NG/DL (ref 0.61–1.12)
TSH SERPL DL<=0.05 MIU/L-ACNC: 0.42 UIU/ML (ref 0.45–4.5)

## 2024-01-22 PROCEDURE — 36415 COLL VENOUS BLD VENIPUNCTURE: CPT

## 2024-01-22 PROCEDURE — 84443 ASSAY THYROID STIM HORMONE: CPT

## 2024-01-22 PROCEDURE — 83036 HEMOGLOBIN GLYCOSYLATED A1C: CPT

## 2024-01-22 PROCEDURE — 84439 ASSAY OF FREE THYROXINE: CPT

## 2024-01-24 ENCOUNTER — OFFICE VISIT (OUTPATIENT)
Dept: PHYSICAL THERAPY | Facility: CLINIC | Age: 59
End: 2024-01-24
Payer: COMMERCIAL

## 2024-01-24 DIAGNOSIS — M75.52 SUBACROMIAL BURSITIS OF BOTH SHOULDERS: ICD-10-CM

## 2024-01-24 DIAGNOSIS — M75.82 TENDONITIS OF BOTH ROTATOR CUFFS: Primary | ICD-10-CM

## 2024-01-24 DIAGNOSIS — M54.12 CERVICAL RADICULOPATHY: ICD-10-CM

## 2024-01-24 DIAGNOSIS — M75.81 TENDONITIS OF BOTH ROTATOR CUFFS: Primary | ICD-10-CM

## 2024-01-24 DIAGNOSIS — M75.51 SUBACROMIAL BURSITIS OF BOTH SHOULDERS: ICD-10-CM

## 2024-01-24 PROCEDURE — 97110 THERAPEUTIC EXERCISES: CPT | Performed by: PHYSICAL THERAPIST

## 2024-01-24 PROCEDURE — 97112 NEUROMUSCULAR REEDUCATION: CPT | Performed by: PHYSICAL THERAPIST

## 2024-01-24 NOTE — PROGRESS NOTES
"Daily Note     Today's date: 2024  Patient name: Estefani Olmstead  : 1965  MRN: [id number]  Referring provider: Sami Damon*  Dx:   Encounter Diagnosis     ICD-10-CM    1. Tendonitis of both rotator cuffs  M75.81     M75.82       2. Subacromial bursitis of both shoulders  M75.51     M75.52       3. Cervical radiculopathy  M54.12                      Subjective: Pt presents today stating that she is doing well, busy day but can't complain.  Eager to start strengthening.        Objective: See treatment diary below      Assessment: Proceeded with strengthening today without symptom exacerbation after modification of scaption position.  Educated and prepared for DC with is scheduled for next week.     Precautions: Hypothyroidism       Manuals 12/18 12/27 01/03 01/10 1/17 1/24                                                           Neuro Re-Ed             Postural Education and progression 10 min 10 min   10 min assessment        Bank Account/Cut Finger/Obstruction education analogy. perform  5 min  8 min          TB row/ext    Nv? nv GTB 2 x 10       Scaption       3# 2 x 10       Tband ER      GTB 2 x 10                                 Ther Ex             CS Retraction NB 20x            CS Ret + Pt OP D B 4 x 5 4x5  5x5 5x5 5 x 5 2 x 5       Progress to Ret Pt OP + Ext Nv if ready.   5x  3x5  3x5  2 x 5 2 x 5       CS ret+ext+Op    5x  5 x 2 2 x 5       Other progression?                                       NLTT L  1\"x10  1\"x10  1\"x10  1\" x 10 1\" x 10       Ther Activity             CS Range Rot/Ext B B B          ER Strength 3+* to 5/5.  B           NLTT L Better B B B B                     Reaching behind in car Test nv?            Modalities             CP/HP PRN.                             "

## 2024-01-29 ENCOUNTER — OFFICE VISIT (OUTPATIENT)
Dept: INTERNAL MEDICINE CLINIC | Facility: CLINIC | Age: 59
End: 2024-01-29
Payer: COMMERCIAL

## 2024-01-29 VITALS
DIASTOLIC BLOOD PRESSURE: 82 MMHG | BODY MASS INDEX: 28.99 KG/M2 | SYSTOLIC BLOOD PRESSURE: 120 MMHG | WEIGHT: 174 LBS | HEIGHT: 65 IN

## 2024-01-29 DIAGNOSIS — B34.9 VIRAL ILLNESS: Primary | ICD-10-CM

## 2024-01-29 DIAGNOSIS — R11.0 NAUSEA: ICD-10-CM

## 2024-01-29 DIAGNOSIS — Z13.220 SCREENING, LIPID: ICD-10-CM

## 2024-01-29 DIAGNOSIS — E78.00 HYPERCHOLESTEROLEMIA: ICD-10-CM

## 2024-01-29 DIAGNOSIS — Z13.1 SCREENING FOR DIABETES MELLITUS: ICD-10-CM

## 2024-01-29 DIAGNOSIS — E03.9 ACQUIRED HYPOTHYROIDISM: ICD-10-CM

## 2024-01-29 PROCEDURE — 99214 OFFICE O/P EST MOD 30 MIN: CPT | Performed by: INTERNAL MEDICINE

## 2024-01-29 RX ORDER — ONDANSETRON 4 MG/1
4 TABLET, ORALLY DISINTEGRATING ORAL EVERY 6 HOURS PRN
Qty: 20 TABLET | Refills: 1 | Status: SHIPPED | OUTPATIENT
Start: 2024-01-29

## 2024-01-29 NOTE — PROGRESS NOTES
Name: Estefani Olmstead      : 1965      MRN: [id number]  Encounter Provider: Lea Reyes, MD  Encounter Date: 2024   [facility]    Assessment & Plan   Suspect flu like symptoms from viral illness vs recurrent diverticulitis  She will continue a bland diet/clears and call if the abdominal pain does not improve or worsens  1. Viral illness    2. Nausea  -     ondansetron (Zofran ODT) 4 mg disintegrating tablet; Take 1 tablet (4 mg total) by mouth every 6 (six) hours as needed for nausea or vomiting    3. Acquired hypothyroidism  Assessment & Plan:  No symptoms of hyperthyroidism so continue current levothyroxine 75mcg a day      Orders:  -     TSH, 3rd generation with Free T4 reflex; Future; Expected date: 2024  -     Basic metabolic panel; Future; Expected date: 2024  -     CBC; Future; Expected date: 2024    4. Hypercholesterolemia  -     Basic metabolic panel; Future; Expected date: 2024  -     CBC; Future; Expected date: 2024    5. Screening, lipid  -     Lipid panel; Future; Expected date: 2024    6. Screening for diabetes mellitus  -     Hemoglobin A1C; Future; Expected date: 2024    BMI Counseling: Body mass index is 28.96 kg/m². The BMI is above normal. Nutrition recommendations include decreasing overall calorie intake, consuming healthier snacks, and moderation in carbohydrate intake.         Subjective      Flu-like symptoms in the past week  Abdominal pain started 2 nights ago with nausea. The pain is mild to moderate around the umbilicus and not radiating. She has recurrent diverticulitis so has followed a bland diet since  Recent labs reviewed and TSH slightly low; A1C elevated      Review of Systems   Constitutional:  Positive for chills and fatigue. Negative for fever.   HENT:  Negative for congestion and sore throat.    Respiratory:  Negative for cough.    Gastrointestinal:  Positive for abdominal pain  "and nausea. Negative for constipation and diarrhea.   Genitourinary:  Negative for difficulty urinating and dysuria.       Current Outpatient Medications on File Prior to Visit   Medication Sig   • ALPRAZolam (XANAX) 0.25 mg tablet Take 1 tablet (0.25 mg total) by mouth daily as needed for anxiety Do not take with alcohol. Do not drive after taking this.   • aspirin (ECOTRIN LOW STRENGTH) 81 mg EC tablet Take 1 tablet (81 mg total) by mouth daily   • buPROPion (WELLBUTRIN XL) 150 mg 24 hr tablet TAKE ONE TABLET BY MOUTH DAILY   • cetirizine (ZyrTEC) 10 mg tablet Take 10 mg by mouth daily   • cholecalciferol (VITAMIN D3) 400 units tablet Take 400 Units by mouth daily   • Lactobacillus (PROBIOTIC ACIDOPHILUS PO) Take by mouth   • levothyroxine 75 mcg tablet TAKE ONE TABLET BY MOUTH DAILY   • Multiple Vitamin (MULTIVITAMINS PO) Take 1 tablet by mouth daily   • Omega-3 Fatty Acids (OMEGA-3 FISH OIL) 1200 MG CAPS Take by mouth   • rosuvastatin (CRESTOR) 20 MG tablet TAKE ONE TABLET BY MOUTH EVERY DAY   • vitamin E 100 UNIT capsule Take 100 Units by mouth daily   • [DISCONTINUED] ondansetron (Zofran ODT) 4 mg disintegrating tablet Take 1 tablet (4 mg total) by mouth every 6 (six) hours as needed for nausea or vomiting       Objective     /82 (BP Location: Left arm, Patient Position: Sitting, Cuff Size: Standard)   Ht 5' 5\" (1.651 m)   Wt 78.9 kg (174 lb)   BMI 28.96 kg/m²     Physical Exam  Constitutional:       Appearance: She is well-developed. She is not ill-appearing.   Eyes:      Conjunctiva/sclera: Conjunctivae normal.   Cardiovascular:      Rate and Rhythm: Normal rate and regular rhythm.      Heart sounds: Normal heart sounds. No murmur heard.  Pulmonary:      Effort: Pulmonary effort is normal. No respiratory distress.      Breath sounds: Normal breath sounds. No wheezing or rales.   Abdominal:      General: There is no distension.      Palpations: Abdomen is soft. There is no mass.      Tenderness: " There is abdominal tenderness in the periumbilical area. There is no guarding or rebound.   Musculoskeletal:      Cervical back: Neck supple.      Right lower leg: No edema.      Left lower leg: No edema.   Neurological:      Mental Status: She is alert and oriented to person, place, and time.   Psychiatric:         Mood and Affect: Mood normal.         Behavior: Behavior normal.         Thought Content: Thought content normal.         Judgment: Judgment normal.       Lea Reyes, MD

## 2024-01-31 ENCOUNTER — OFFICE VISIT (OUTPATIENT)
Dept: PHYSICAL THERAPY | Facility: CLINIC | Age: 59
End: 2024-01-31
Payer: COMMERCIAL

## 2024-01-31 DIAGNOSIS — M54.12 CERVICAL RADICULOPATHY: ICD-10-CM

## 2024-01-31 DIAGNOSIS — M75.82 TENDONITIS OF BOTH ROTATOR CUFFS: Primary | ICD-10-CM

## 2024-01-31 DIAGNOSIS — M75.81 TENDONITIS OF BOTH ROTATOR CUFFS: Primary | ICD-10-CM

## 2024-01-31 DIAGNOSIS — M75.51 SUBACROMIAL BURSITIS OF BOTH SHOULDERS: ICD-10-CM

## 2024-01-31 DIAGNOSIS — Z12.31 ENCOUNTER FOR SCREENING MAMMOGRAM FOR MALIGNANT NEOPLASM OF BREAST: Primary | ICD-10-CM

## 2024-01-31 DIAGNOSIS — M75.52 SUBACROMIAL BURSITIS OF BOTH SHOULDERS: ICD-10-CM

## 2024-01-31 PROCEDURE — 97112 NEUROMUSCULAR REEDUCATION: CPT | Performed by: PHYSICAL THERAPIST

## 2024-01-31 PROCEDURE — 97110 THERAPEUTIC EXERCISES: CPT | Performed by: PHYSICAL THERAPIST

## 2024-01-31 NOTE — PROGRESS NOTES
"Daily Note     Today's date: 2024  Patient name: Estefani Olmstead  : 1965  MRN: [id number]  Referring provider: Sami Damon*  Dx:   Encounter Diagnosis     ICD-10-CM    1. Tendonitis of both rotator cuffs  M75.81     M75.82       2. Cervical radiculopathy  M54.12       3. Subacromial bursitis of both shoulders  M75.51     M75.52                      Subjective: Pt presents today stating that she is feeling well, compliant with HEP and prepared for DC to HEP.      Objective: See treatment diary below      Assessment: Pt at this time is likely safe to DC to HEP.  She is performing all tasks that she desires to perform, comfortable with her current HEP and is very content at this time.  If she is to have a decline in any form she is welcome to return as needed.      Precautions: Hypothyroidism       Manuals 12/18 12/27 01/03 01/10 1/17 1/24 1/31                                                          Neuro Re-Ed             Postural Education and progression 10 min 10 min   10 min assessment        Bank Account/Cut Finger/Obstruction education analogy. perform  5 min  8 min          TB row/ext    Nv? nv GTB 2 x 10 GTB 2 x 10      Scaption       3# 2 x 10 2 x 10      Tband ER      GTB 2 x 10 GTB 2 x 10                                Ther Ex             CS Retraction NB 20x            CS Ret + Pt OP D B 4 x 5 4x5  5x5 5x5 5 x 5 2 x 5 2 x 5      Progress to Ret Pt OP + Ext Nv if ready.   5x  3x5  3x5  2 x 5 2 x 5 2  x5      CS ret+ext+Op    5x  5 x 2 2 x 5 2  x5      Other progression?                                       NLTT L  1\"x10  1\"x10  1\"x10  1\" x 10 1\" x 10 1\" x 10      Ther Activity             CS Range Rot/Ext B B B          ER Strength 3+* to 5/5.  B           NLTT L Better B B B B                     Reaching behind in car Test nv?            Modalities             CP/HP PRN.                             "

## 2024-02-05 ENCOUNTER — ANNUAL EXAM (OUTPATIENT)
Dept: OBGYN CLINIC | Facility: CLINIC | Age: 59
End: 2024-02-05
Payer: COMMERCIAL

## 2024-02-05 VITALS
HEIGHT: 65 IN | SYSTOLIC BLOOD PRESSURE: 132 MMHG | BODY MASS INDEX: 29.16 KG/M2 | DIASTOLIC BLOOD PRESSURE: 70 MMHG | WEIGHT: 175 LBS

## 2024-02-05 DIAGNOSIS — Z12.31 ENCOUNTER FOR SCREENING MAMMOGRAM FOR MALIGNANT NEOPLASM OF BREAST: ICD-10-CM

## 2024-02-05 DIAGNOSIS — N95.2 VAGINAL ATROPHY: ICD-10-CM

## 2024-02-05 DIAGNOSIS — Z01.419 ENCOUNTER FOR ANNUAL ROUTINE GYNECOLOGICAL EXAMINATION: Primary | ICD-10-CM

## 2024-02-05 PROCEDURE — S0612 ANNUAL GYNECOLOGICAL EXAMINA: HCPCS | Performed by: OBSTETRICS & GYNECOLOGY

## 2024-02-05 NOTE — PROGRESS NOTES
Assessment/Plan:  Pap deferred due to low risk status.  Encouraged self breast examination as well as calcium supplementation.  Continue annual mammogram.  Reviewed colon cancer screening, up-to-date.  Discussed treatment options for symptomatic atrophy.  She will restart vaginal moisturizer 2 times per week and call with update in 8 weeks.  She will continue to follow-up with primary care and gastroenterology as scheduled.  Return to office in 1 year and as needed  No problem-specific Assessment & Plan notes found for this encounter.       Diagnoses and all orders for this visit:    Encounter for annual routine gynecological examination    Encounter for screening mammogram for malignant neoplasm of breast  -     Mammo screening bilateral w 3d & cad; Future    Vaginal atrophy          Subjective:      Patient ID: Estefani Olmstead is a 58 y.o. female.    HPI    This is a pleasant 58-year-old female G0 who presents for GYN exam.  She went through menopause at age 45.  She has never been on hormone replacement therapy.  She denies any vaginal bleeding or spotting.  No changes in bowel or bladder function.  She has been in a monogamous relationship for over 28 years.  Pap smears have been normal.  Last Pap 1/2023.  She has had significant vaginal dryness day-to-day.  She was planning on using a vaginal moisturizer but admits to being noncompliant.  She does recall using vaginal estrogen several years prior with having side effect of depression and then discontinued.    She follows up with a physician on a regular basis.  Colon 9/2022    Pap 1/2023    Vaginal estrogen s/e depression    The following portions of the patient's history were reviewed and updated as appropriate: allergies, current medications, past family history, past medical history, past social history, past surgical history, and problem list.    Review of Systems   Constitutional:  Negative for fatigue, fever and unexpected weight change.   Respiratory:   "Negative for cough, chest tightness, shortness of breath and wheezing.    Cardiovascular: Negative.  Negative for chest pain and palpitations.   Gastrointestinal: Negative.  Negative for abdominal distention, abdominal pain, blood in stool, constipation, diarrhea, nausea and vomiting.   Genitourinary: Negative.  Negative for difficulty urinating, dyspareunia, dysuria, flank pain, frequency, genital sores, hematuria, pelvic pain, urgency, vaginal bleeding, vaginal discharge and vaginal pain.   Skin:  Negative for rash.         Objective:      /70   Ht 5' 5\" (1.651 m)   Wt 79.4 kg (175 lb)   BMI 29.12 kg/m²          Physical Exam  Constitutional:       Appearance: Normal appearance. She is well-developed.   HENT:      Head: Normocephalic and atraumatic.   Cardiovascular:      Rate and Rhythm: Normal rate and regular rhythm.   Pulmonary:      Effort: Pulmonary effort is normal.      Breath sounds: Normal breath sounds.   Chest:   Breasts:     Right: No inverted nipple, mass, nipple discharge, skin change or tenderness.      Left: No inverted nipple, mass, nipple discharge, skin change or tenderness.   Abdominal:      General: Bowel sounds are normal. There is no distension.      Palpations: Abdomen is soft.      Tenderness: There is no abdominal tenderness. There is no guarding or rebound.   Genitourinary:     Labia:         Right: No rash, tenderness or lesion.         Left: No rash, tenderness or lesion.       Vagina: Normal. No signs of injury. No vaginal discharge or tenderness.      Cervix: No cervical motion tenderness, discharge, friability, lesion, erythema or cervical bleeding.      Uterus: Not enlarged and not tender.       Adnexa:         Right: No mass, tenderness or fullness.          Left: No mass, tenderness or fullness.     Neurological:      Mental Status: She is alert.   Psychiatric:         Behavior: Behavior normal.         External genitalia is within normal limits.  The vagina is evident " of estrogen deficiency.  There is no pelvic floor prolapse.

## 2024-02-23 ENCOUNTER — HOSPITAL ENCOUNTER (OUTPATIENT)
Dept: RADIOLOGY | Age: 59
Discharge: HOME | End: 2024-02-23
Payer: COMMERCIAL

## 2024-02-23 DIAGNOSIS — Z12.31 ENCOUNTER FOR SCREENING MAMMOGRAM FOR MALIGNANT NEOPLASM OF BREAST: ICD-10-CM

## 2024-02-23 PROCEDURE — 77063 BREAST TOMOSYNTHESIS BI: CPT

## 2024-02-23 PROCEDURE — 77067 SCR MAMMO BI INCL CAD: CPT

## 2024-03-08 ENCOUNTER — TELEPHONE (OUTPATIENT)
Age: 59
End: 2024-03-08

## 2024-03-08 NOTE — TELEPHONE ENCOUNTER
Pt just wanted to make the office aware that she faxed over her intermittent FMLA and to watch out for it.

## 2024-03-18 ENCOUNTER — OFFICE VISIT (OUTPATIENT)
Dept: INTERNAL MEDICINE CLINIC | Facility: CLINIC | Age: 59
End: 2024-03-18
Payer: COMMERCIAL

## 2024-03-18 VITALS
HEART RATE: 65 BPM | WEIGHT: 174.8 LBS | DIASTOLIC BLOOD PRESSURE: 82 MMHG | BODY MASS INDEX: 29.12 KG/M2 | HEIGHT: 65 IN | SYSTOLIC BLOOD PRESSURE: 132 MMHG | OXYGEN SATURATION: 99 %

## 2024-03-18 DIAGNOSIS — K57.92 ACUTE DIVERTICULITIS: Primary | ICD-10-CM

## 2024-03-18 PROCEDURE — 99213 OFFICE O/P EST LOW 20 MIN: CPT | Performed by: INTERNAL MEDICINE

## 2024-03-18 RX ORDER — METRONIDAZOLE 500 MG/1
500 TABLET ORAL EVERY 8 HOURS SCHEDULED
Qty: 21 TABLET | Refills: 0 | Status: SHIPPED | OUTPATIENT
Start: 2024-03-18 | End: 2024-03-25

## 2024-03-18 RX ORDER — CIPROFLOXACIN 500 MG/1
500 TABLET, FILM COATED ORAL EVERY 12 HOURS SCHEDULED
Qty: 14 TABLET | Refills: 0 | Status: SHIPPED | OUTPATIENT
Start: 2024-03-18 | End: 2024-03-25

## 2024-03-18 NOTE — LETTER
March 18, 2024     Patient: Estefani Olmstead  YOB: 1965  Date of Visit: 3/18/2024      To Whom it May Concern:    Estefani Olmstead is under my professional care. Estefani was seen in my office on 3/18/2024. Estefani may return to work on 3/21/2024. Please excuse from work 3/19-3/20/2024 dx: acute diverticulitis .    If you have any questions or concerns, please don't hesitate to call.         Sincerely,          Lea Reyes, MD        CC: No Recipients

## 2024-03-18 NOTE — PROGRESS NOTES
Name: Estefani Olmstead      : 1965      MRN: [id number]  Encounter Provider: Lea Reyes, MD  Encounter Date: 3/18/2024   [facility]    Assessment & Plan     1. Acute diverticulitis  -     ciprofloxacin (CIPRO) 500 mg tablet; Take 1 tablet (500 mg total) by mouth every 12 (twelve) hours for 7 days  -     metroNIDAZOLE (FLAGYL) 500 mg tablet; Take 1 tablet (500 mg total) by mouth every 8 (eight) hours for 7 days       Failure of conservative treatment with low residue diet so will start antibiotics   Discussed imaging if symptoms continue  No need to repeat food allergy panel or celiac testing    Subjective      Recurrent diverticulitis without complications, last episode in October and took ciprofloxacin and Flagyl  A week ago felt nausea and stomach upset with small caliber stools.  This was after eating at a food fair. LLQ pain followed. She has some BRBPR  Taking ibuprofen Tylenol Miralax. Following a low residue diet.   Normal bowel movement today but still with the pain and nausea  Concerned about a food allergy or gluten sensitivity  She has been tested for celiac and and food allergy panel, had EGD  Follows a gluten free diet already        Review of Systems   Constitutional:  Positive for fever. Negative for chills.   Gastrointestinal:  Positive for abdominal pain, anal bleeding, constipation and nausea. Negative for blood in stool, diarrhea and vomiting.   Genitourinary:  Negative for difficulty urinating and dysuria.       Current Outpatient Medications on File Prior to Visit   Medication Sig   • aspirin (ECOTRIN LOW STRENGTH) 81 mg EC tablet Take 1 tablet (81 mg total) by mouth daily   • buPROPion (WELLBUTRIN XL) 150 mg 24 hr tablet TAKE ONE TABLET BY MOUTH DAILY   • cholecalciferol (VITAMIN D3) 400 units tablet Take 400 Units by mouth daily   • Lactobacillus (PROBIOTIC ACIDOPHILUS PO) Take by mouth   • levothyroxine 75 mcg tablet TAKE ONE TABLET BY MOUTH  "DAILY   • Multiple Vitamin (MULTIVITAMINS PO) Take 1 tablet by mouth daily   • Omega-3 Fatty Acids (OMEGA-3 FISH OIL) 1200 MG CAPS Take by mouth   • ondansetron (Zofran ODT) 4 mg disintegrating tablet Take 1 tablet (4 mg total) by mouth every 6 (six) hours as needed for nausea or vomiting   • rosuvastatin (CRESTOR) 20 MG tablet TAKE ONE TABLET BY MOUTH EVERY DAY   • vitamin E 100 UNIT capsule Take 100 Units by mouth daily   • ALPRAZolam (XANAX) 0.25 mg tablet Take 1 tablet (0.25 mg total) by mouth daily as needed for anxiety Do not take with alcohol. Do not drive after taking this. (Patient not taking: Reported on 2/5/2024)   • cetirizine (ZyrTEC) 10 mg tablet Take 10 mg by mouth daily (Patient not taking: Reported on 2/5/2024)       Objective     /82 (BP Location: Left arm, Patient Position: Sitting, Cuff Size: Standard)   Pulse 65   Ht 5' 5\" (1.651 m)   Wt 79.3 kg (174 lb 12.8 oz)   SpO2 99%   BMI 29.09 kg/m²     Physical Exam  Constitutional:       Appearance: She is well-developed. She is not ill-appearing.   Eyes:      Conjunctiva/sclera: Conjunctivae normal.   Cardiovascular:      Rate and Rhythm: Normal rate and regular rhythm.      Heart sounds: Normal heart sounds. No murmur heard.  Pulmonary:      Effort: Pulmonary effort is normal. No respiratory distress.      Breath sounds: Normal breath sounds. No wheezing or rales.   Abdominal:      General: There is no distension.      Palpations: Abdomen is soft. There is no mass.      Tenderness: There is abdominal tenderness in the left lower quadrant. There is no guarding or rebound.   Neurological:      Mental Status: She is alert and oriented to person, place, and time.   Psychiatric:         Mood and Affect: Mood normal.         Behavior: Behavior normal.         Thought Content: Thought content normal.         Judgment: Judgment normal.       Lea Reyes, MD    "

## 2024-03-24 DIAGNOSIS — E78.00 HYPERCHOLESTEROLEMIA: ICD-10-CM

## 2024-03-24 RX ORDER — ROSUVASTATIN CALCIUM 20 MG/1
TABLET, COATED ORAL
Qty: 90 TABLET | Refills: 0 | Status: SHIPPED | OUTPATIENT
Start: 2024-03-24

## 2024-04-15 DIAGNOSIS — F33.1 MODERATE EPISODE OF RECURRENT MAJOR DEPRESSIVE DISORDER (HCC): ICD-10-CM

## 2024-04-15 RX ORDER — BUPROPION HYDROCHLORIDE 150 MG/1
TABLET ORAL
Qty: 90 TABLET | Refills: 0 | Status: SHIPPED | OUTPATIENT
Start: 2024-04-15

## 2024-06-03 DIAGNOSIS — E03.9 HYPOTHYROIDISM, UNSPECIFIED TYPE: ICD-10-CM

## 2024-06-03 RX ORDER — LEVOTHYROXINE SODIUM 0.07 MG/1
TABLET ORAL
Qty: 90 TABLET | Refills: 1 | Status: SHIPPED | OUTPATIENT
Start: 2024-06-03

## 2024-06-19 DIAGNOSIS — Z00.6 ENCOUNTER FOR EXAMINATION FOR NORMAL COMPARISON OR CONTROL IN CLINICAL RESEARCH PROGRAM: ICD-10-CM

## 2024-06-23 DIAGNOSIS — F33.1 MODERATE EPISODE OF RECURRENT MAJOR DEPRESSIVE DISORDER (HCC): ICD-10-CM

## 2024-06-24 RX ORDER — BUPROPION HYDROCHLORIDE 150 MG/1
TABLET ORAL
Qty: 90 TABLET | Refills: 0 | Status: SHIPPED | OUTPATIENT
Start: 2024-06-24

## 2024-07-05 ENCOUNTER — APPOINTMENT (OUTPATIENT)
Dept: LAB | Facility: CLINIC | Age: 59
End: 2024-07-05
Payer: COMMERCIAL

## 2024-07-05 DIAGNOSIS — Z13.1 SCREENING FOR DIABETES MELLITUS: ICD-10-CM

## 2024-07-05 DIAGNOSIS — Z00.6 ENCOUNTER FOR EXAMINATION FOR NORMAL COMPARISON OR CONTROL IN CLINICAL RESEARCH PROGRAM: ICD-10-CM

## 2024-07-05 DIAGNOSIS — E03.9 ACQUIRED HYPOTHYROIDISM: ICD-10-CM

## 2024-07-05 DIAGNOSIS — Z00.8 OTHER SPECIFIED GENERAL MEDICAL EXAMINATION: ICD-10-CM

## 2024-07-05 DIAGNOSIS — E78.00 HYPERCHOLESTEROLEMIA: ICD-10-CM

## 2024-07-05 DIAGNOSIS — Z13.220 SCREENING, LIPID: ICD-10-CM

## 2024-07-05 LAB
ANION GAP SERPL CALCULATED.3IONS-SCNC: 10 MMOL/L (ref 4–13)
BUN SERPL-MCNC: 19 MG/DL (ref 5–25)
CALCIUM SERPL-MCNC: 9.8 MG/DL (ref 8.4–10.2)
CHLORIDE SERPL-SCNC: 102 MMOL/L (ref 96–108)
CHOLEST SERPL-MCNC: 202 MG/DL
CO2 SERPL-SCNC: 26 MMOL/L (ref 21–32)
CREAT SERPL-MCNC: 0.8 MG/DL (ref 0.6–1.3)
ERYTHROCYTE [DISTWIDTH] IN BLOOD BY AUTOMATED COUNT: 13.4 % (ref 11.6–15.1)
EST. AVERAGE GLUCOSE BLD GHB EST-MCNC: 126 MG/DL
GFR SERPL CREATININE-BSD FRML MDRD: 79 ML/MIN/1.73SQ M
GLUCOSE SERPL-MCNC: 93 MG/DL (ref 65–140)
HBA1C MFR BLD: 6 %
HCT VFR BLD AUTO: 38.1 % (ref 34.8–46.1)
HDLC SERPL-MCNC: 49 MG/DL
HGB BLD-MCNC: 12.5 G/DL (ref 11.5–15.4)
LDLC SERPL CALC-MCNC: 84 MG/DL (ref 0–100)
MCH RBC QN AUTO: 28.4 PG (ref 26.8–34.3)
MCHC RBC AUTO-ENTMCNC: 32.8 G/DL (ref 31.4–37.4)
MCV RBC AUTO: 87 FL (ref 82–98)
NONHDLC SERPL-MCNC: 153 MG/DL
PLATELET # BLD AUTO: 348 THOUSANDS/UL (ref 149–390)
PMV BLD AUTO: 10.7 FL (ref 8.9–12.7)
POTASSIUM SERPL-SCNC: 4 MMOL/L (ref 3.5–5.3)
RBC # BLD AUTO: 4.4 MILLION/UL (ref 3.81–5.12)
SODIUM SERPL-SCNC: 138 MMOL/L (ref 135–147)
TRIGL SERPL-MCNC: 347 MG/DL
TSH SERPL DL<=0.05 MIU/L-ACNC: 0.59 UIU/ML (ref 0.45–4.5)
WBC # BLD AUTO: 7.77 THOUSAND/UL (ref 4.31–10.16)

## 2024-07-05 PROCEDURE — 80048 BASIC METABOLIC PNL TOTAL CA: CPT

## 2024-07-05 PROCEDURE — 36415 COLL VENOUS BLD VENIPUNCTURE: CPT

## 2024-07-05 PROCEDURE — 83036 HEMOGLOBIN GLYCOSYLATED A1C: CPT

## 2024-07-05 PROCEDURE — 80061 LIPID PANEL: CPT

## 2024-07-05 PROCEDURE — 84443 ASSAY THYROID STIM HORMONE: CPT

## 2024-07-05 PROCEDURE — 85027 COMPLETE CBC AUTOMATED: CPT

## 2024-07-15 ENCOUNTER — OFFICE VISIT (OUTPATIENT)
Dept: INTERNAL MEDICINE CLINIC | Facility: CLINIC | Age: 59
End: 2024-07-15
Payer: COMMERCIAL

## 2024-07-15 VITALS
HEART RATE: 72 BPM | OXYGEN SATURATION: 97 % | HEIGHT: 65 IN | SYSTOLIC BLOOD PRESSURE: 140 MMHG | WEIGHT: 172 LBS | BODY MASS INDEX: 28.66 KG/M2 | DIASTOLIC BLOOD PRESSURE: 82 MMHG

## 2024-07-15 DIAGNOSIS — E03.9 ACQUIRED HYPOTHYROIDISM: ICD-10-CM

## 2024-07-15 DIAGNOSIS — R00.2 PALPITATIONS: ICD-10-CM

## 2024-07-15 DIAGNOSIS — Z00.00 ANNUAL PHYSICAL EXAM: Primary | ICD-10-CM

## 2024-07-15 DIAGNOSIS — K57.92 ACUTE DIVERTICULITIS: ICD-10-CM

## 2024-07-15 DIAGNOSIS — E78.00 HYPERCHOLESTEROLEMIA: ICD-10-CM

## 2024-07-15 DIAGNOSIS — R73.01 IMPAIRED FASTING BLOOD SUGAR: ICD-10-CM

## 2024-07-15 PROCEDURE — 99396 PREV VISIT EST AGE 40-64: CPT | Performed by: INTERNAL MEDICINE

## 2024-07-15 RX ORDER — CIPROFLOXACIN 500 MG/1
500 TABLET, FILM COATED ORAL EVERY 12 HOURS SCHEDULED
Qty: 14 TABLET | Refills: 0 | Status: SHIPPED | OUTPATIENT
Start: 2024-07-15 | End: 2024-07-22

## 2024-07-15 RX ORDER — METRONIDAZOLE 500 MG/1
500 TABLET ORAL EVERY 8 HOURS SCHEDULED
Qty: 21 TABLET | Refills: 0 | Status: SHIPPED | OUTPATIENT
Start: 2024-07-15 | End: 2024-07-22

## 2024-07-15 NOTE — PROGRESS NOTES
Adult Annual Physical  Name: Estefani Olmstead      : 1965      MRN: [id number]  Encounter Provider: Lea Reyes, MD  Encounter Date: 7/15/2024   [facility]    Assessment & Plan   1. Annual physical exam  2. Acute diverticulitis  Comments:  start abx if abd pain does not improve this week  Orders:  -     ciprofloxacin (CIPRO) 500 mg tablet; Take 1 tablet (500 mg total) by mouth every 12 (twelve) hours for 7 days  -     metroNIDAZOLE (FLAGYL) 500 mg tablet; Take 1 tablet (500 mg total) by mouth every 8 (eight) hours for 7 days  3. Acquired hypothyroidism  -     CBC and differential; Future; Expected date: 2025  -     Comprehensive metabolic panel; Future; Expected date: 2025  -     TSH, 3rd generation with Free T4 reflex; Future; Expected date: 2025  4. Hypercholesterolemia  -     Lipid panel; Future; Expected date: 2025  -     Comprehensive metabolic panel; Future; Expected date: 2025  5. Impaired fasting blood sugar  -     Hemoglobin A1C; Future; Expected date: 2025  6. Palpitations  -     Ambulatory Referral to Cardiology; Future  Immunizations and preventive care screenings were discussed with patient today. Appropriate education was printed on patient's after visit summary.    Counseling:  Exercise: the importance of regular exercise/physical activity was discussed. Recommend exercise 3-5 times per week for at least 30 minutes.          History of Present Illness     Adult Annual Physical:  Patient presents for annual physical.     Diet and Physical Activity:  - Diet/Nutrition: well balanced diet. low residue diet x 2 weeks due to possible diverticulitis  - Exercise: no formal exercise.    General Health:  - Sleep: sleeps well.  - Hearing: normal hearing bilateral ears.  - Vision: wears glasses and goes for regular eye exams.  - Dental: regular dental visits.    /GYN Health:  - Follows with GYN: yes.   - Menopause:  "postmenopausal.     Review of Systems   Constitutional:  Negative for fever and unexpected weight change.   Respiratory:  Negative for shortness of breath.    Cardiovascular:  Positive for palpitations (every few weeks). Negative for chest pain.   Gastrointestinal:  Positive for abdominal pain, diarrhea and nausea. Negative for blood in stool and constipation.        +recurrent diverticulitis  2 weeks of lower abd pain so she followed a low residue diet and improving  Last night had a cheese steak and experienced epigastric pain that is still present today   Genitourinary:  Negative for difficulty urinating.   Neurological:  Negative for dizziness and headaches.         Objective     /82   Pulse 72   Ht 5' 5\" (1.651 m)   Wt 78 kg (172 lb)   SpO2 97%   BMI 28.62 kg/m²     Physical Exam  Constitutional:       General: She is not in acute distress.     Appearance: She is well-developed. She is not ill-appearing, toxic-appearing or diaphoretic.   HENT:      Right Ear: External ear normal. There is no impacted cerumen.      Left Ear: External ear normal. There is no impacted cerumen.   Eyes:      Conjunctiva/sclera: Conjunctivae normal.   Cardiovascular:      Rate and Rhythm: Normal rate and regular rhythm.      Heart sounds: Normal heart sounds. No murmur heard.  Pulmonary:      Effort: Pulmonary effort is normal. No respiratory distress.      Breath sounds: Normal breath sounds. No stridor. No wheezing or rales.   Abdominal:      General: There is no distension.      Palpations: Abdomen is soft. There is no mass.      Tenderness: There is no abdominal tenderness. There is no guarding or rebound.   Musculoskeletal:      Cervical back: Neck supple.      Right lower leg: No edema.      Left lower leg: No edema.   Neurological:      Mental Status: She is alert and oriented to person, place, and time.   Psychiatric:         Mood and Affect: Mood normal.         Behavior: Behavior normal.         Thought Content: " Thought content normal.         Judgment: Judgment normal.

## 2024-07-15 NOTE — PATIENT INSTRUCTIONS
"Patient Education     Routine physical for adults   The Basics   Written by the doctors and editors at [facility]   What is a physical? -- A physical is a routine visit, or \"check-up,\" with your doctor. You might also hear it called a \"wellness visit\" or \"preventive visit.\"  During each visit, the doctor will:   Ask about your physical and mental health   Ask about your habits, behaviors, and lifestyle   Do an exam   Give you vaccines if needed   Talk to you about any medicines you take   Give advice about your health   Answer your questions  Getting regular check-ups is an important part of taking care of your health. It can help your doctor find and treat any problems you have. But it's also important for preventing health problems.  A routine physical is different from a \"sick visit.\" A sick visit is when you see a doctor because of a health concern or problem. Since physicals are scheduled ahead of time, you can think about what you want to ask the doctor.  How often should I get a physical? -- It depends on your age and health. In general, for people age 21 years and older:   If you are younger than 50 years, you might be able to get a physical every 3 years.   If you are 50 years or older, your doctor might recommend a physical every year.  If you have an ongoing health condition, like diabetes or high blood pressure, your doctor will probably want to see you more often.  What happens during a physical? -- In general, each visit will include:   Physical exam - The doctor or nurse will check your height, weight, heart rate, and blood pressure. They will also look at your eyes and ears. They will ask about how you are feeling and whether you have any symptoms that bother you.   Medicines - It's a good idea to bring a list of all the medicines you take to each doctor visit. Your doctor will talk to you about your medicines and answer any questions. Tell them if you are having any side effects that bother you. You " "should also tell them if you are having trouble paying for any of your medicines.   Habits and behaviors - This includes:   Your diet   Your exercise habits   Whether you smoke, drink alcohol, or use drugs   Whether you are sexually active   Whether you feel safe at home  Your doctor will talk to you about things you can do to improve your health and lower your risk of health problems. They will also offer help and support. For example, if you want to quit smoking, they can give you advice and might prescribe medicines. If you want to improve your diet or get more physical activity, they can help you with this, too.   Lab tests, if needed - The tests you get will depend on your age and situation. For example, your doctor might want to check your:   Cholesterol   Blood sugar   Iron level   Vaccines - The recommended vaccines will depend on your age, health, and what vaccines you already had. Vaccines are very important because they can prevent certain serious or deadly infections.   Discussion of screening - \"Screening\" means checking for diseases or other health problems before they cause symptoms. Your doctor can recommend screening based on your age, risk, and preferences. This might include tests to check for:   Cancer, such as breast, prostate, cervical, ovarian, colorectal, prostate, lung, or skin cancer   Sexually transmitted infections, such as chlamydia and gonorrhea   Mental health conditions like depression and anxiety  Your doctor will talk to you about the different types of screening tests. They can help you decide which screenings to have. They can also explain what the results might mean.   Answering questions - The physical is a good time to ask the doctor or nurse questions about your health. If needed, they can refer you to other doctors or specialists, too.  Adults older than 65 years often need other care, too. As you get older, your doctor will talk to you about:   How to prevent falling at " home   Hearing or vision tests   Memory testing   How to take your medicines safely   Making sure that you have the help and support you need at home  All topics are updated as new evidence becomes available and our peer review process is complete.  This topic retrieved from [employer] on: May 02, 2024.  Topic 702137 Version 1.0  Release: 32.4.3 - C32.122  © 2024 UpToDate, Inc. and/or its affiliates. All rights reserved.  Consumer Information Use and Disclaimer   Disclaimer: This generalized information is a limited summary of diagnosis, treatment, and/or medication information. It is not meant to be comprehensive and should be used as a tool to help the user understand and/or assess potential diagnostic and treatment options. It does NOT include all information about conditions, treatments, medications, side effects, or risks that may apply to a specific patient. It is not intended to be medical advice or a substitute for the medical advice, diagnosis, or treatment of a health care provider based on the health care provider's examination and assessment of a patient's specific and unique circumstances. Patients must speak with a health care provider for complete information about their health, medical questions, and treatment options, including any risks or benefits regarding use of medications. This information does not endorse any treatments or medications as safe, effective, or approved for treating a specific patient. UpToDate, Inc. and its affiliates disclaim any warranty or liability relating to this information or the use thereof.The use of this information is governed by the Terms of Use, available at https://www.woltersKnack.ituwer.com/en/know/clinical-effectiveness-terms. 2024© UpToDate, Inc. and its affiliates and/or licensors. All rights reserved.  Copyright   © 2024 UpToDate, Inc. and/or its affiliates. All rights reserved.

## 2024-07-18 DIAGNOSIS — R00.2 PALPITATION: Primary | ICD-10-CM

## 2024-08-06 ENCOUNTER — OFFICE VISIT (OUTPATIENT)
Dept: OBGYN CLINIC | Facility: CLINIC | Age: 59
End: 2024-08-06
Payer: COMMERCIAL

## 2024-08-06 ENCOUNTER — HOSPITAL ENCOUNTER (OUTPATIENT)
Dept: RADIOLOGY | Facility: HOSPITAL | Age: 59
Discharge: HOME | End: 2024-08-06
Attending: ORTHOPAEDIC SURGERY
Payer: COMMERCIAL

## 2024-08-06 VITALS — HEIGHT: 65 IN | WEIGHT: 174 LBS | BODY MASS INDEX: 28.99 KG/M2

## 2024-08-06 DIAGNOSIS — M25.562 LEFT KNEE PAIN, UNSPECIFIED CHRONICITY: ICD-10-CM

## 2024-08-06 DIAGNOSIS — S83.412A SPRAIN OF MEDIAL COLLATERAL LIGAMENT OF LEFT KNEE, INITIAL ENCOUNTER: Primary | ICD-10-CM

## 2024-08-06 PROCEDURE — 99213 OFFICE O/P EST LOW 20 MIN: CPT | Performed by: ORTHOPAEDIC SURGERY

## 2024-08-06 PROCEDURE — 73562 X-RAY EXAM OF KNEE 3: CPT

## 2024-08-06 RX ORDER — CELECOXIB 200 MG/1
200 CAPSULE ORAL 2 TIMES DAILY
Qty: 30 CAPSULE | Refills: 0 | Status: SHIPPED | OUTPATIENT
Start: 2024-08-06

## 2024-08-06 NOTE — PATIENT INSTRUCTIONS
On exam, she has pain with MCL stressed and tenderness at the distal end.   She can use the knee immobilizer to sleep and use the hinge knee brace during the day to not stress the MCL.  Celebrex 200mg 2x/daily and can supplement with Tylenol with food  A hinge knee brace was provided in the office today.   She can perform her her own range of motion; full extension and flexion. We will hold on any formal PT at this time.

## 2024-08-06 NOTE — PROGRESS NOTES
Assessment:   Diagnosis ICD-10-CM Associated Orders   1. Left knee pain, unspecified chronicity  M25.562 XR knee 3 vw left non injury      2. Sprain of medial collateral ligament of left knee, initial encounter  S83.412A Durable Medical Equipment          Plan:  New xrays of the left knee were obtained today and reviewed with the patient and her   On exam, she has pain with MCL stressed and tenderness at the distal end.   She can use the knee immobilizer to sleep and use the hinge knee brace during the day to not stress the MCL.  Celebrex 200mg 2x/daily and can supplement with Tylenol with food  A hinge knee brace was provided in the office today.   She can perform her her own range of motion; full extension and flexion.  We will hold on any formal PT at this time.  At her next follow-up we will reevaluate the left knee to determine if she needs an MRI    To do next visit:  Return in about 6 weeks (around 9/17/2024) for Left knee.    The above stated was discussed in layman's terms and the patient expressed understanding.  All questions were answered to the patient's satisfaction.       Scribe Attestation      I,:  Janelle Rios am acting as a scribe while in the presence of the attending physician.:       I,:  Lisa Haines MD personally performed the services described in this documentation    as scribed in my presence.:               Subjective:   Estefani Olmstead is a 59 y.o. female who presents today for an initial evaluation for her left knee injury. She states that on 8/4/2024, patient was at work pushing a wooden  step in [employer] when she pivoted and felt a burning sensation on the inside of the knee.  Patient reports that she cannot weight-bear at that time and had to be wheeled out to her car.  She presents today in a knee immobilizer and cane for ambulation.  Patient has been taking Tylenol and Advil as needed for pain discomfort.      Review of systems negative unless otherwise specified  in HPI  Review of Systems   Constitutional:  Negative for chills, fever and unexpected weight change.   HENT:  Negative for hearing loss, nosebleeds and sore throat.    Eyes:  Negative for pain, redness and visual disturbance.   Respiratory:  Negative for cough, shortness of breath and wheezing.    Cardiovascular:  Negative for chest pain, palpitations and leg swelling.   Gastrointestinal:  Negative for abdominal pain and nausea.   Genitourinary:  Negative for dyspareunia, dysuria and frequency.   Skin:  Negative for rash and wound.   Neurological:  Negative for dizziness, numbness and headaches.   Psychiatric/Behavioral:  Negative for decreased concentration and suicidal ideas. The patient is not nervous/anxious.        Past Medical History:   Diagnosis Date    Cervicalgia     last assessed: 10/23/2012    Colon polyp     Diverticula of colon     Diverticulitis 3/14/2022    Fibroid 2015    US reveals 1.8 cm rt intramural, 4.8 pedunculated fibroid rt adnexa    Insomnia        Past Surgical History:   Procedure Laterality Date    COLONOSCOPY      LAPAROSCOPIC CHOLECYSTECTOMY         Family History   Problem Relation Age of Onset    Stroke Mother     Stroke Father     Dementia Sister     Alzheimer's disease Sister     Stroke Brother     No Known Problems Maternal Aunt     No Known Problems Maternal Aunt     No Known Problems Maternal Aunt     No Known Problems Maternal Aunt     No Known Problems Paternal Aunt     Breast cancer Cousin 50    Uterine cancer Cousin         76    Diabetes Family     Hyperlipidemia Family     Hypertension Family        Social History     Occupational History    Not on file   Tobacco Use    Smoking status: Former     Current packs/day: 0.00     Average packs/day: 0.2 packs/day for 15.0 years (3.0 ttl pk-yrs)     Types: Cigarettes     Start date:      Quit date:      Years since quittin.6    Smokeless tobacco: Never   Vaping Use    Vaping status: Never Used   Substance and  Sexual Activity    Alcohol use: Yes     Alcohol/week: 4.0 standard drinks of alcohol     Types: 4 Standard drinks or equivalent per week     Comment: social    Drug use: No    Sexual activity: Not Currently     Birth control/protection: Post-menopausal         Current Outpatient Medications:     ALPRAZolam (XANAX) 0.25 mg tablet, Take 1 tablet (0.25 mg total) by mouth daily as needed for anxiety Do not take with alcohol. Do not drive after taking this. (Patient not taking: Reported on 2/5/2024), Disp: 10 tablet, Rfl: 0    aspirin (ECOTRIN LOW STRENGTH) 81 mg EC tablet, Take 1 tablet (81 mg total) by mouth daily, Disp:  , Rfl:     buPROPion (WELLBUTRIN XL) 150 mg 24 hr tablet, TAKE ONE TABLET BY MOUTH DAILY, Disp: 90 tablet, Rfl: 0    cetirizine (ZyrTEC) 10 mg tablet, Take 10 mg by mouth daily (Patient not taking: Reported on 2/5/2024), Disp: , Rfl:     cholecalciferol (VITAMIN D3) 400 units tablet, Take 400 Units by mouth daily, Disp: , Rfl:     Lactobacillus (PROBIOTIC ACIDOPHILUS PO), Take by mouth, Disp: , Rfl:     levothyroxine 75 mcg tablet, TAKE ONE TABLET BY MOUTH DAILY, Disp: 90 tablet, Rfl: 1    Multiple Vitamin (MULTIVITAMINS PO), Take 1 tablet by mouth daily, Disp: , Rfl:     Omega-3 Fatty Acids (OMEGA-3 FISH OIL) 1200 MG CAPS, Take by mouth, Disp: , Rfl:     ondansetron (Zofran ODT) 4 mg disintegrating tablet, Take 1 tablet (4 mg total) by mouth every 6 (six) hours as needed for nausea or vomiting, Disp: 20 tablet, Rfl: 1    rosuvastatin (CRESTOR) 20 MG tablet, TAKE ONE TABLET BY MOUTH EVERY DAY, Disp: 90 tablet, Rfl: 0    vitamin E 100 UNIT capsule, Take 100 Units by mouth daily, Disp: , Rfl:     Allergies   Allergen Reactions    Osphena [Ospemifene] Hives    Food     Nuts - Food Allergy     Pollen Extract     Sesame Seed (Diagnostic) - Food Allergy     Sulfamethoxazole-Trimethoprim Headache    Wheat Bran - Food Allergy           There were no vitals filed for this visit.    Body mass index is 28.96  "kg/m².  Wt Readings from Last 3 Encounters:   08/06/24 78.9 kg (174 lb)   07/15/24 78 kg (172 lb)   03/18/24 79.3 kg (174 lb 12.8 oz)       Objective:                    Left Knee Exam     Tenderness   The patient is experiencing tenderness in the MCL.    Range of Motion   Extension:  0   Flexion:  90 (with pain)     Tests   Varus: negative Valgus: positive (with pain, no laxity)    Other   Erythema: absent  Sensation: normal  Pulse: present  Swelling: mild  Effusion: effusion present    Comments:  Calf is soft and non tender            Diagnostics, reviewed and taken today if performed as documented:    The attending physician has personally reviewed the pertinent films in PACS and interpretation is as follows:  X-rays of the left knee 3 views: No osseous abnormalities with well-maintained joint space    Procedures, if performed today:    Procedures    None performed      Portions of the record may have been created with voice recognition software.  Occasional wrong word or \"sound a like\" substitutions may have occurred due to the inherent limitations of voice recognition software.  Read the chart carefully and recognize, using context, where substitutions have occurred.  "

## 2024-08-12 ENCOUNTER — TELEPHONE (OUTPATIENT)
Age: 59
End: 2024-08-12

## 2024-08-12 NOTE — TELEPHONE ENCOUNTER
Caller: franklin    Doctor: Zaki    Reason for call: Would like to have a return to work note to return tomorrow 8/13/24    Call back#: [phone]  Fax # [phone]

## 2024-08-13 NOTE — TELEPHONE ENCOUNTER
Patient had a missed call from ortho- no notes not sure what the call was in regards to. I let patient now work note was sent  to the fax provided

## 2024-08-14 ENCOUNTER — TELEPHONE (OUTPATIENT)
Age: 59
End: 2024-08-14

## 2024-08-14 NOTE — TELEPHONE ENCOUNTER
Caller: Patient    Doctor: Zaki    Reason for call: Patient was seen 8/6 and cleared for work, but  would appreciate if her [id number] note could be amended to specify she has no restrictions, as her employer does require this clarification.    Please fax the updated note to the following.  The patient indicates she would also appreciate a call back to let her know when the note is sent.    Fax#: [phone] Attn@ [email or web address]    Call back#: [phone]

## 2024-08-26 LAB
APOB+LDLR+PCSK9 GENE MUT ANL BLD/T: NOT DETECTED
BRCA1+BRCA2 DEL+DUP + FULL MUT ANL BLD/T: NOT DETECTED
MLH1+MSH2+MSH6+PMS2 GN DEL+DUP+FUL M: NOT DETECTED

## 2024-09-04 ENCOUNTER — TELEPHONE (OUTPATIENT)
Dept: INTERNAL MEDICINE CLINIC | Facility: CLINIC | Age: 59
End: 2024-09-04

## 2024-09-04 DIAGNOSIS — F41.9 ANXIETY: ICD-10-CM

## 2024-09-04 RX ORDER — ALPRAZOLAM 0.25 MG
0.25 TABLET ORAL DAILY PRN
Qty: 10 TABLET | Refills: 0 | Status: SHIPPED | OUTPATIENT
Start: 2024-09-04

## 2024-09-04 NOTE — TELEPHONE ENCOUNTER
FMLA update forms needed to be completed and patient faxed this in. Blank form scanned and originals in PCP bin to be completed. Patient asked if this would be don asap her FMLA exp 9/14/24    Forms fee collected via-phone and processed. Patient made aware once completed forms will be faxed and she would like to be notified.

## 2024-09-11 ENCOUNTER — EVENT RECORDER/EXTENDED HOLTER (OUTPATIENT)
Dept: CARDIOLOGY CLINIC | Facility: MEDICAL CENTER | Age: 59
End: 2024-09-11
Payer: COMMERCIAL

## 2024-09-11 DIAGNOSIS — R00.2 PALPITATION: ICD-10-CM

## 2024-09-11 PROCEDURE — 93248 EXT ECG>7D<15D REV&INTERPJ: CPT | Performed by: INTERNAL MEDICINE

## 2024-09-20 ENCOUNTER — HOSPITAL ENCOUNTER (OUTPATIENT)
Dept: NON INVASIVE DIAGNOSTICS | Facility: HOSPITAL | Age: 59
Discharge: HOME | End: 2024-09-20
Attending: INTERNAL MEDICINE
Payer: COMMERCIAL

## 2024-09-20 VITALS
SYSTOLIC BLOOD PRESSURE: 140 MMHG | HEART RATE: 72 BPM | HEIGHT: 65 IN | BODY MASS INDEX: 28.99 KG/M2 | WEIGHT: 174 LBS | DIASTOLIC BLOOD PRESSURE: 82 MMHG

## 2024-09-20 DIAGNOSIS — R00.2 PALPITATION: ICD-10-CM

## 2024-09-20 LAB
AORTIC ROOT: 3 CM
AORTIC VALVE MEAN VELOCITY: 10.8 M/S
APICAL FOUR CHAMBER EJECTION FRACTION: 51 %
ASCENDING AORTA: 3 CM
AV MEAN GRADIENT: 5 MMHG
AV PEAK GRADIENT: 8 MMHG
BSA FOR ECHO PROCEDURE: 1.86 M2
DOP CALC AO PEAK VEL: 1.45 M/S
DOP CALC AO VTI: 28.27 CM
E WAVE DECELERATION TIME: 389 MS
E/A RATIO: 0.59
FRACTIONAL SHORTENING: 38 (ref 28–44)
INTERVENTRICULAR SEPTUM IN DIASTOLE (PARASTERNAL SHORT AXIS VIEW): 0.8 CM
INTERVENTRICULAR SEPTUM: 0.8 CM (ref 0.6–1.1)
LAAS-AP2: 24.1 CM2
LAAS-AP4: 20.6 CM2
LEFT ATRIUM SIZE: 3.3 CM
LEFT ATRIUM VOLUME (MOD BIPLANE): 65 ML
LEFT ATRIUM VOLUME INDEX (MOD BIPLANE): 34.9 ML/M2
LEFT INTERNAL DIMENSION IN SYSTOLE: 3 CM (ref 2.1–4)
LEFT VENTRICULAR INTERNAL DIMENSION IN DIASTOLE: 4.8 CM (ref 3.5–6)
LEFT VENTRICULAR POSTERIOR WALL IN END DIASTOLE: 0.7 CM
LEFT VENTRICULAR STROKE VOLUME: 71 ML
LVSV (TEICH): 71 ML
MV E'TISSUE VEL-LAT: 5 CM/S
MV E'TISSUE VEL-SEP: 7 CM/S
MV PEAK A VEL: 0.98 M/S
MV PEAK E VEL: 58 CM/S
MV STENOSIS PRESSURE HALF TIME: 113 MS
MV VALVE AREA P 1/2 METHOD: 1.95
RIGHT ATRIUM AREA SYSTOLE A4C: 18.4 CM2
RIGHT VENTRICLE ID DIMENSION: 3.4 CM
SL CV LEFT ATRIUM LENGTH A2C: 6.3 CM
SL CV LV EF: 65
SL CV PED ECHO LEFT VENTRICLE DIASTOLIC VOLUME (MOD BIPLANE) 2D: 105 ML
SL CV PED ECHO LEFT VENTRICLE SYSTOLIC VOLUME (MOD BIPLANE) 2D: 34 ML
TR MAX PG: 19 MMHG
TR PEAK VELOCITY: 2.2 M/S
TRICUSPID ANNULAR PLANE SYSTOLIC EXCURSION: 2.1 CM
TRICUSPID VALVE PEAK REGURGITATION VELOCITY: 2.17 M/S

## 2024-09-20 PROCEDURE — 93306 TTE W/DOPPLER COMPLETE: CPT

## 2024-09-20 PROCEDURE — 93306 TTE W/DOPPLER COMPLETE: CPT | Performed by: INTERNAL MEDICINE

## 2024-09-23 DIAGNOSIS — E78.00 HYPERCHOLESTEROLEMIA: ICD-10-CM

## 2024-09-24 RX ORDER — ROSUVASTATIN CALCIUM 20 MG/1
TABLET, COATED ORAL
Qty: 90 TABLET | Refills: 1 | Status: SHIPPED | OUTPATIENT
Start: 2024-09-24

## 2024-09-27 ENCOUNTER — EVENT RECORDER/EXTENDED HOLTER (OUTPATIENT)
Dept: CARDIOLOGY CLINIC | Facility: MEDICAL CENTER | Age: 59
End: 2024-09-27

## 2024-10-02 ENCOUNTER — OFFICE VISIT (OUTPATIENT)
Dept: CARDIOLOGY CLINIC | Facility: CLINIC | Age: 59
End: 2024-10-02
Payer: COMMERCIAL

## 2024-10-02 VITALS
HEIGHT: 65 IN | DIASTOLIC BLOOD PRESSURE: 70 MMHG | WEIGHT: 180.1 LBS | SYSTOLIC BLOOD PRESSURE: 130 MMHG | HEART RATE: 67 BPM | BODY MASS INDEX: 30.01 KG/M2

## 2024-10-02 DIAGNOSIS — G47.33 OBSTRUCTIVE SLEEP APNEA: ICD-10-CM

## 2024-10-02 DIAGNOSIS — E66.3 OVERWEIGHT (BMI 25.0-29.9): ICD-10-CM

## 2024-10-02 DIAGNOSIS — I10 HYPERTENSION, UNSPECIFIED TYPE: ICD-10-CM

## 2024-10-02 DIAGNOSIS — I47.19 PAT (PAROXYSMAL ATRIAL TACHYCARDIA) (HCC): ICD-10-CM

## 2024-10-02 DIAGNOSIS — R00.2 PALPITATIONS: ICD-10-CM

## 2024-10-02 DIAGNOSIS — I47.10 PSVT (PAROXYSMAL SUPRAVENTRICULAR TACHYCARDIA) (HCC): Primary | ICD-10-CM

## 2024-10-02 DIAGNOSIS — E78.00 HYPERCHOLESTEROLEMIA: ICD-10-CM

## 2024-10-02 PROCEDURE — 99204 OFFICE O/P NEW MOD 45 MIN: CPT | Performed by: INTERNAL MEDICINE

## 2024-10-02 RX ORDER — VITAMIN B COMPLEX
1 CAPSULE ORAL DAILY
COMMUNITY

## 2024-10-02 RX ORDER — METOPROLOL SUCCINATE 25 MG/1
25 TABLET, EXTENDED RELEASE ORAL
Qty: 30 TABLET | Refills: 3 | Status: SHIPPED | OUTPATIENT
Start: 2024-10-02

## 2024-10-02 NOTE — PROGRESS NOTES
[facility]   [facility] [address] [address] [place] [address]  p: [phone] p: [phone]  f: [phone] f: [phone]         Cardiology Consultation Visit    [address]  Patient Identification:  Estefani Olmstead  1965     [id number] Care Team:  Lea Reyes, MD (PCP)  Reyes, Lea, MD (Referring Provider)         ASSESSMENT & PLAN   Assessment & Plan     Discussion & Summary: Mrs. Estefani Olmstead was seen and examined today for acute assessment of Palpitations/Flushing and monitoring of chronic conditions noted below. Precautions and reasons to call our office or proceed to ER were discussed in detail. Patient expressed understanding and questions were answered.     Follow Up & Next Steps: Plan on follow up in-clinic in 3 months.    1. PSVT (paroxysmal supraventricular tachycardia) (HCC)  Comments:  Several years of  Palpitations. Labs without clear aggravating factors.  On buproprion since 2021.  Holter with symptomatic PSVT/PAT/PACs.  TTE wnl.  BB Trial.  Orders:  -     Ambulatory Referral to Cardiology  -     metoprolol succinate (TOPROL-XL) 25 mg 24 hr tablet; Take 1 tablet (25 mg total) by mouth at bedtime  2. PAT (paroxysmal atrial tachycardia) (HCC)  Comments:  Plan as above.  3. Palpitations  Comments:  Plan as above.  4. Hypercholesterolemia  Comments:  On statin / omega 3 at this time.  TRI with labile control.  Will reassess with possible uptitration of statin vs fenofibrate addition.  5. Hypertension, unspecified type  6. Obstructive sleep apnea  Comments:  Sleep study ordered in 2021, not yet completed.  Will revisit.  7. Overweight (BMI 25.0-29.9)  Comments:   on diet/lifestyle modification.        SUBJECTIVE   Subjective   Chief Complaint: Mrs. Olmstead is a 59 y.o. female and former smoker with a PMH significant for but not limited to .  She  presents to clinic by Self-Referral for Progressive Palpitations .    HPI / Interval Hx: She was at her baseline state of health: independent of adl's, working in Rehab with [employer] , though not exercising regularly, when she decided have her Palpitations evaluated . She states that originally her symptoms began about 4 years ago and would come and go. She notes no prior history of overt anemia, fair control of her thyroid function, treatment with Wellbutrin since . She denies any recent hospitalizations, prior cardiac history, family history of early cad, or family history of scd.  She notes HTN that runs in her family. Her brother passed recently of an CVA/ICH in his late 70s. She notes CVA in a second brother who is living in his late 40s.  Her father was in his late 60s with a CVA.  She also notes that her mother with a CVA in her 80s. She currently denies any cp, diaphoresis, n/v, sob, wallace, near syncope, syncope, orthopnea, pnd, or ble edema. She notes fair control of her diet and exercise habits. She reports a diet that is somewhat balanced but has room for improvement, salt intake that is well controlled, water intake of 60oz/day, caffeine intake of 1C/day, occasional alcohol intake, and exercise that is inconsistent but with plans for plans for improvement. However, she does note significant stressors at home.  She states that on  her  almost , and now that he's home her habits need help. She is otherwise compliant with medications but does not maintain a detailed BP log.  Of note, the patient's cardiac risk factor(s) include: advanced age and dyslipidemia.    Review of Systems: This was a comprehensive review of symptoms with problem focused details as note above.  Review of Systems   Constitutional: Negative for chills and fever.   HENT:  Negative for congestion and sore throat.    Eyes:  Negative for blurred vision and double vision.   Cardiovascular:  Positive for irregular  heartbeat and palpitations. Negative for chest pain, claudication, dyspnea on exertion, leg swelling, near-syncope, orthopnea, paroxysmal nocturnal dyspnea and syncope.   Respiratory:  Positive for snoring (Mild obstructive sleep apnea, HECTOR = 8.6, wears mouth guard). Negative for cough, shortness of breath, sleep disturbances due to breathing and wheezing.    Hematologic/Lymphatic: Negative for bleeding problem. Does not bruise/bleed easily.   Skin:  Negative for itching and rash.   Musculoskeletal:  Positive for arthritis (right hand). Negative for joint pain and joint swelling.   Gastrointestinal:  Positive for abdominal pain (getting over gi bug) and diarrhea. Negative for constipation, nausea and vomiting.   Genitourinary:  Negative for dysuria and hematuria.   Neurological:  Negative for headaches and light-headedness.   Psychiatric/Behavioral:  Positive for depression (well controlled with wellbutrin). The patient is not nervous/anxious.         Past Hx: The following portions of the patient's history were reviewed and updated as appropriate: past medical history, past social history, past family history, past surgical history, current medications, allergies, past surgical history and problem list.  Social History     Socioeconomic History    Marital status: /Civil Union     Spouse name: Not on file    Number of children: Not on file    Years of education: Not on file    Highest education level: Not on file   Occupational History    Not on file   Tobacco Use    Smoking status: Former     Current packs/day: 0.00     Average packs/day: 0.2 packs/day for 15.0 years (3.0 ttl pk-yrs)     Types: Cigarettes     Start date:      Quit date:      Years since quittin.7    Smokeless tobacco: Never   Vaping Use    Vaping status: Never Used   Substance and Sexual Activity    Alcohol use: Yes     Alcohol/week: 4.0 standard drinks of alcohol     Types: 4 Standard drinks or equivalent per week     Comment:  social    Drug use: No    Sexual activity: Not Currently     Birth control/protection: Post-menopausal   Other Topics Concern    Not on file   Social History Narrative    Not on file     Social Determinants of Health     Financial Resource Strain: Not on file   Food Insecurity: Not on file   Transportation Needs: Not on file   Physical Activity: Not on file   Stress: Not on file   Social Connections: Not on file   Intimate Partner Violence: Not on file   Housing Stability: Not on file        Family History   Problem Relation Age of Onset    Stroke Mother     Stroke Father     Dementia Sister     Alzheimer's disease Sister     Stroke Brother     No Known Problems Maternal Aunt     No Known Problems Maternal Aunt     No Known Problems Maternal Aunt     No Known Problems Maternal Aunt     No Known Problems Paternal Aunt     Breast cancer Cousin 50    Uterine cancer Cousin         76    Diabetes Family     Hyperlipidemia Family     Hypertension Family         Patient Active Problem List   Diagnosis    Vaginal atrophy    Hypercholesterolemia    Hypothyroidism    Moderate episode of recurrent major depressive disorder (HCC)    Obstructive sleep apnea    Tendonitis of both rotator cuffs    Subacromial bursitis of both shoulders    Sprain of medial collateral ligament of left knee, initial encounter        Past Surgical History:   Procedure Laterality Date    COLONOSCOPY      LAPAROSCOPIC CHOLECYSTECTOMY          Current Outpatient Medications   Medication Instructions    ALPRAZolam (XANAX) 0.25 mg, Oral, Daily PRN, Do not take with alcohol. Do not drive after taking this.    aspirin (ECOTRIN LOW STRENGTH) 81 mg, Oral, Daily    b complex vitamins capsule 1 capsule, Oral, Daily    buPROPion (WELLBUTRIN XL) 150 mg 24 hr tablet TAKE ONE TABLET BY MOUTH DAILY    celecoxib (CELEBREX) 200 mg, Oral, 2 times daily, With food    cetirizine (ZYRTEC) 10 mg, Oral, Daily    cholecalciferol (VITAMIN D3) 400 Units, Oral, Daily     Lactobacillus (PROBIOTIC ACIDOPHILUS PO) Oral    levothyroxine 75 mcg tablet TAKE ONE TABLET BY MOUTH DAILY    Multiple Vitamin (MULTIVITAMINS PO) 1 tablet, Oral, Daily    Omega-3 Fatty Acids (OMEGA-3 FISH OIL) 1200 MG CAPS Oral    ondansetron (ZOFRAN ODT) 4 mg, Oral, Every 6 hours PRN    rosuvastatin (CRESTOR) 20 MG tablet TAKE ONE TABLET BY MOUTH EVERY DAY    vitamin E 100 Units, Oral, Daily        Allergies   Allergen Reactions    Osphena [Ospemifene] Hives    Food     Nuts - Food Allergy     Pollen Extract     Sesame Seed (Diagnostic) - Food Allergy     Sulfamethoxazole-Trimethoprim Headache    Wheat Bran - Food Allergy          OBJECTIVE   Objective     Physical Exam: Patient seen and examined with no need for interpretive services.   VITALS   Vitals:    10/02/24 0845   BP: 130/70   Pulse: 67     BMI   Body mass index is 29.97 kg/m².  Physical Exam  Constitutional:       Interventions: She is not intubated.  HENT:      Head: Normocephalic and atraumatic.      Right Ear: External ear normal.      Left Ear: External ear normal.      Nose: Nose normal.   Eyes:      General: No scleral icterus.        Right eye: No discharge.         Left eye: No discharge.   Neck:      Vascular: No carotid bruit.   Cardiovascular:      Rate and Rhythm: Normal rate and regular rhythm.      Pulses: Normal pulses.      Heart sounds: Normal heart sounds.   Pulmonary:      Effort: No tachypnea, accessory muscle usage or respiratory distress. She is not intubated.      Breath sounds: Normal breath sounds.   Musculoskeletal:      Cervical back: Neck supple.      Right lower leg: No edema.      Left lower leg: No edema.   Skin:     General: Skin is warm and dry.      Capillary Refill: Capillary refill takes less than 2 seconds.   Neurological:      Mental Status: She is alert and oriented to person, place, and time. Mental status is at baseline.   Psychiatric:         Mood and Affect: Mood normal.         Behavior: Behavior normal.           Recent CV Testin24  TTE  LVEF 65%, G1DD, No RWMA, Mild LAE  24  30D  ZioPatch with PSVT/PAT, Symptomatic PACs  21  EKG  SBrady    For historical clinical findings, see the Supplemental Documentation section.     CLOSING   Administrative Statements     Coordination of Care: During our visit, I spent 40 minutes with the patient, and greater than 55% of this time was spent on counseling and coordination of care, including addressing diagnostic results, prognosis, risks and benefits of treatment options, instructions for management, patient/family education, importance of treatment compliance, along with risk factor reductions. During today's visit there was no need for interpretive services.     Dictation Disclaimer: This note was completed in part utilizing direct voice recognition software. Grammatical errors, random word insertion, spelling mistakes, and incomplete sentences may be an occasional consequence of the system secondary to software limitations, ambient noise and hardware issues. At the time of dictation, efforts were made to edit, clarify and /or correct errors.  Please read the chart carefully and recognize, using context, where substitutions have occurred.  If you have any questions or concerns about the context, text or information contained within the body of this dictation, please contact me, the provider, for further clarification.     Lenora Bryant, DO 10/02/24                      SUPPLEMENTAL DOCUMENTATION     Thoracic History   ======================  PRIOR VISIT INTERVALS  ======================  Date: 10/02/24, Initial Consultation      =====================  PRIOR DIAGNOSTIC TESTS  =====================  IMAGING   I have personally reviewed pertinent reports.  No results found.      EKG   Date: 21, sinus bradycardia  Date: 02/10/14, normal sinus rhythm  Date: 10/13/20, normal sinus rhythm      TELE   No results found for this or any recent visit.        HOLTER   Extended Holter Study Date: 08/25/24-09/06/24  IMPRESSION:  Normal device function capturing 11 days and 19  hours of analyzed data  Baseline rhythm was NSR w/ occasional SBRADY/STACH (min 42 bpm, avg 70 bpm, max 137 bpm)  Occasional isolated SVE noted of mild burden (PAC < 3.9%)  Rare isolated VE noted of insignificant burden (PVC < 1%)  23 short salvos of PSVT/PAT-likely were noted (fastest/longest: 19 sec at a max of 222 bpm and avg 154 bpm)  No prolonged PAF/Flutter or NSVT was identified  No significant pauses or advanced degree heart block  Triggered events (2) corresponded with SVE: PSVT/PAT (1), NSR-SVE (1)    Extended Holter Study Date: 08/11/24-08/25/24  IMPRESSION:  Normal device function capturing 12 days and 12 hours of analyzed data  Baseline rhythm was NSR w/ occasional SBRADY/STACH (min 42 bpm, avg 70 bpm, max 127 bpm)  Occasional isolated SVE noted of mild burden (PAC < 1.9%)  Occasional isolated VE noted of insignificant burden (PVC < 1%)  Occasional short salvos of PSVT/PAT-likely were noted (fastest: 7 beats at 194 bpm, longest: 20 beats at 124 bpm)  No prolonged PAF/Flutter or NSVT was identified  No significant pauses or advanced degree heart block  Triggered events (2) corresponded with SVE: NSR-SVE (1), STACH-SVE (1)  Diary events (2) corresponded with SVE: NSR (1), NSR-SVE (1)  No triggered events corresponding with significant arrhythmia      DEVICE   No results found for this or any previous visit.      EP   No results found for this or any previous visit.      CT   No results found for this or any previous visit.      CATH   No results found for this or any previous visit.      STRESS   No results found for this or any previous visit.       TTE   Results for orders placed during the hospital encounter of 09/20/24  Echo complete w/ contrast if indicated  Interpretation Summary    Left Ventricle: Left ventricular cavity size is normal. Wall thickness is normal. The left  ventricular ejection fraction is 65%. Systolic function is normal. Wall motion is normal. Diastolic function is mildly abnormal, consistent with grade I (abnormal) relaxation.    Right Ventricle: Right ventricular cavity size is normal. Systolic function is normal.    Left Atrium: The atrium is mildly dilated.      CHEYANNE   No results found for this or any previous visit.      CMR   No results found for this or any previous visit.      LABS     Lab Results   Component Value Date     02/10/2014    K 4.0 07/05/2024    K 4.2 05/19/2023    K 4.3 01/09/2023    K 3.7 02/10/2014     07/05/2024     05/19/2023     01/09/2023     02/10/2014    CO2 26 07/05/2024    CO2 28 05/19/2023    CO2 26 01/09/2023    CO2 26 02/10/2014    BUN 19 07/05/2024    BUN 16 05/19/2023    BUN 16 01/09/2023    BUN 12 02/10/2014    CREATININE 0.80 07/05/2024    CREATININE 0.84 05/19/2023    CREATININE 0.74 01/09/2023    CREATININE 0.78 02/10/2014    EGFR 79 07/05/2024    EGFR 75 05/19/2023    EGFR 88 01/09/2023    GLUC 93 07/05/2024    GLUC 92 06/10/2021    GLUC 91 02/21/2021    AST 18 05/19/2023    AST 18 01/09/2023    AST 27 05/19/2022    AST 25 07/23/2015    AST 21 03/18/2015    AST 16 02/10/2014    ALT 32 05/19/2023    ALT 32 01/09/2023    ALT 41 05/19/2022    ALT 47 07/23/2015    ALT 29 03/18/2015    ALT 31 02/10/2014    TBILI 0.60 05/19/2023    TBILI 0.39 01/09/2023    TBILI 0.63 05/19/2022    ALB 4.3 05/19/2023    ALB 4.1 01/09/2023    ALB 4.2 05/19/2022    ALB 4.4 07/23/2015    ALB 4.2 03/18/2015    ALB 4.7 02/10/2014    CALCIUM 9.8 07/05/2024    CALCIUM 9.1 05/19/2023    CALCIUM 8.9 01/09/2023    CALCIUM 9.3 02/10/2014      Lab Results   Component Value Date    WBC 7.77 07/05/2024    WBC 7.37 05/19/2023    WBC 5.71 01/09/2023    WBC 6.30 02/10/2014    HGB 12.5 07/05/2024    HGB 12.9 05/19/2023    HGB 11.9 01/09/2023    HGB 12.6 02/10/2014    HCT 38.1 07/05/2024    HCT 38.8 05/19/2023    HCT 37.1 01/09/2023    HCT  "38.0 02/10/2014     07/05/2024     05/19/2023     01/09/2023     02/10/2014    INR 0.93 02/21/2021      Lab Results   Component Value Date    CHOLESTEROL 202 (H) 07/05/2024    CHOLESTEROL 181 05/19/2023    CHOLESTEROL 182 01/09/2023    TRIG 347 (H) 07/05/2024    TRIG 141 05/19/2023    TRIG 155 (H) 01/09/2023    TRIG 223 07/23/2015    TRIG 268 07/10/2014    HDL 49 (L) 07/05/2024    HDL 56 05/19/2023    HDL 62 01/09/2023    HDL 59 07/23/2015    HDL 54 07/10/2014    LDLCALC 84 07/05/2024    LDLCALC 97 05/19/2023    LDLCALC 89 01/09/2023    LDLCALC 137 (H) 07/23/2015    LDLCALC 214 (H) 07/10/2014     Lab Results   Component Value Date    HGBA1C 6.0 (H) 07/05/2024    HGBA1C 6.3 (H) 01/22/2024    HGBA1C 6.0 (H) 05/19/2023    HGBA1C 6.0 (H) 07/23/2015    GLUF 113 (H) 05/19/2023    GLUF 119 (H) 01/09/2023    GLUF 91 05/19/2022    GLUF 99 07/10/2014     Lab Results   Component Value Date    UTB7EFLHDBYD 0.587 07/05/2024    UOT8MJJRNVYW 0.425 (L) 01/22/2024    YMJ2CALEWKHF 0.829 05/19/2023    LYM6NJIIRSKE 2.360 07/23/2015     No results found for: \"HSTNI0\", \"HSTNI2\", \"HSTNI4\", \"TROPONINI\"  No results found for: \"BNP\", \"NTBNP\"       "

## 2024-10-06 DIAGNOSIS — F33.1 MODERATE EPISODE OF RECURRENT MAJOR DEPRESSIVE DISORDER (HCC): ICD-10-CM

## 2024-10-07 RX ORDER — BUPROPION HYDROCHLORIDE 150 MG/1
TABLET ORAL
Qty: 90 TABLET | Refills: 1 | Status: SHIPPED | OUTPATIENT
Start: 2024-10-07

## 2024-10-14 ENCOUNTER — OFFICE VISIT (OUTPATIENT)
Dept: INTERNAL MEDICINE CLINIC | Facility: CLINIC | Age: 59
End: 2024-10-14
Payer: COMMERCIAL

## 2024-10-14 VITALS
HEIGHT: 65 IN | DIASTOLIC BLOOD PRESSURE: 80 MMHG | BODY MASS INDEX: 29.69 KG/M2 | WEIGHT: 178.2 LBS | OXYGEN SATURATION: 95 % | SYSTOLIC BLOOD PRESSURE: 114 MMHG | HEART RATE: 57 BPM

## 2024-10-14 DIAGNOSIS — K57.92 ACUTE DIVERTICULITIS: Primary | ICD-10-CM

## 2024-10-14 PROCEDURE — 99213 OFFICE O/P EST LOW 20 MIN: CPT | Performed by: INTERNAL MEDICINE

## 2024-10-14 NOTE — PROGRESS NOTES
Ambulatory Visit  Name: Estefani Olmstead      : 1965      MRN: [id number]  Encounter Provider: Lea Reyes, MD  Encounter Date: 10/14/2024   Encounter department: [facility]    Assessment & Plan  Acute diverticulitis  Recurrent diverticulitis  Continue ciprofloxacin and Flagyl and complete 7 days  Discussed colorectal surgery evaluation  Orders:    Ambulatory Referral to Colorectal Surgery; Future         History of Present Illness     Recurrent diverticulitis, last episode was in March  About 24 hours ago, started with pain in the LLQ  +chills, no fever; +bloating and nausea, no vomiting  Regular bowel movements  The pain is mild to moderate most bothersome symptom is bloating  She started ciprofloxacin and metronidazole which she had on hand.  She has taken 2 doses  She is following a low residue diet      Review of Systems   Constitutional:  Positive for chills and fatigue. Negative for fever and unexpected weight change.   Gastrointestinal:  Positive for abdominal pain and nausea. Negative for blood in stool, constipation, diarrhea and vomiting.   Genitourinary:  Negative for difficulty urinating.     Past Medical History:   Diagnosis Date    Cervicalgia     last assessed: 10/23/2012    Colon polyp     Diverticula of colon     Diverticulitis 3/14/2022    Fibroid 2015    US reveals 1.8 cm rt intramural, 4.8 pedunculated fibroid rt adnexa    Insomnia      Past Surgical History:   Procedure Laterality Date    COLONOSCOPY      LAPAROSCOPIC CHOLECYSTECTOMY       Family History   Problem Relation Age of Onset    Stroke Mother     Stroke Father     Dementia Sister     Alzheimer's disease Sister     Stroke Brother     No Known Problems Maternal Aunt     No Known Problems Maternal Aunt     No Known Problems Maternal Aunt     No Known Problems Maternal Aunt     No Known Problems Paternal Aunt     Breast cancer Cousin 50    Uterine cancer Cousin         76    Diabetes Family      Hyperlipidemia Family     Hypertension Family      Social History     Tobacco Use    Smoking status: Former     Current packs/day: 0.00     Average packs/day: 0.2 packs/day for 15.0 years (3.0 ttl pk-yrs)     Types: Cigarettes     Start date:      Quit date:      Years since quittin.8    Smokeless tobacco: Never   Vaping Use    Vaping status: Never Used   Substance and Sexual Activity    Alcohol use: Yes     Alcohol/week: 4.0 standard drinks of alcohol     Types: 4 Standard drinks or equivalent per week     Comment: social    Drug use: No    Sexual activity: Not Currently     Birth control/protection: Post-menopausal     Current Outpatient Medications on File Prior to Visit   Medication Sig    ALPRAZolam (XANAX) 0.25 mg tablet Take 1 tablet (0.25 mg total) by mouth daily as needed for anxiety Do not take with alcohol. Do not drive after taking this.    aspirin (ECOTRIN LOW STRENGTH) 81 mg EC tablet Take 1 tablet (81 mg total) by mouth daily    b complex vitamins capsule Take 1 capsule by mouth daily    buPROPion (WELLBUTRIN XL) 150 mg 24 hr tablet TAKE ONE TABLET BY MOUTH DAILY    cetirizine (ZyrTEC) 10 mg tablet Take 10 mg by mouth daily    cholecalciferol (VITAMIN D3) 400 units tablet Take 400 Units by mouth daily    Lactobacillus (PROBIOTIC ACIDOPHILUS PO) Take by mouth    levothyroxine 75 mcg tablet TAKE ONE TABLET BY MOUTH DAILY    metoprolol succinate (TOPROL-XL) 25 mg 24 hr tablet Take 1 tablet (25 mg total) by mouth at bedtime    Multiple Vitamin (MULTIVITAMINS PO) Take 1 tablet by mouth daily    Omega-3 Fatty Acids (OMEGA-3 FISH OIL) 1200 MG CAPS Take by mouth    ondansetron (Zofran ODT) 4 mg disintegrating tablet Take 1 tablet (4 mg total) by mouth every 6 (six) hours as needed for nausea or vomiting    rosuvastatin (CRESTOR) 20 MG tablet TAKE ONE TABLET BY MOUTH EVERY DAY    vitamin E 100 UNIT capsule Take 100 Units by mouth daily    celecoxib (CeleBREX) 200 mg capsule Take 1 capsule (200 mg  "total) by mouth 2 (two) times a day With food (Patient not taking: Reported on 10/2/2024)     Allergies   Allergen Reactions    Osphena [Ospemifene] Hives    Food     Nuts - Food Allergy     Pollen Extract     Sesame Seed (Diagnostic) - Food Allergy     Sulfamethoxazole-Trimethoprim Headache    Wheat Bran - Food Allergy      Immunization History   Administered Date(s) Administered    COVID-19 PFIZER VACCINE 0.3 ML IM 12/23/2020, 01/11/2021, 10/09/2021    INFLUENZA 09/19/2016, 10/08/2018, 10/06/2022    Influenza Quadrivalent, 6-35 Months IM 09/19/2016    Influenza, recombinant, quadrivalent,injectable, preservative free 10/13/2020    Tdap 10/13/2020    Zoster Vaccine Recombinant 06/04/2021, 08/27/2021     Objective     /80 (BP Location: Left arm, Patient Position: Sitting, Cuff Size: Standard)   Pulse 57   Ht 5' 5\" (1.651 m)   Wt 80.8 kg (178 lb 3.2 oz)   SpO2 95%   BMI 29.65 kg/m²     Physical Exam  Constitutional:       General: She is not in acute distress.     Appearance: She is well-developed. She is not ill-appearing, toxic-appearing or diaphoretic.   Eyes:      Conjunctiva/sclera: Conjunctivae normal.   Cardiovascular:      Rate and Rhythm: Normal rate and regular rhythm.      Heart sounds: Normal heart sounds. No murmur heard.  Pulmonary:      Effort: Pulmonary effort is normal. No respiratory distress.      Breath sounds: Normal breath sounds. No stridor. No wheezing or rales.   Abdominal:      General: There is no distension.      Palpations: Abdomen is soft. There is no mass.      Tenderness: There is abdominal tenderness in the left lower quadrant. There is no right CVA tenderness, left CVA tenderness, guarding or rebound.   Musculoskeletal:      Cervical back: Neck supple.      Right lower leg: No edema.      Left lower leg: No edema.   Neurological:      Mental Status: She is alert and oriented to person, place, and time.   Psychiatric:         Mood and Affect: Mood normal.         Behavior: " Behavior normal.         Thought Content: Thought content normal.         Judgment: Judgment normal.

## 2024-10-14 NOTE — LETTER
October 14, 2024     Patient: Estefani Olmstead  YOB: 1965  Date of Visit: 10/14/2024      To Whom it May Concern:    Estefani Olmstead is under my professional care. Estefani was seen in my office on 10/14/2024. Estefani may return to work on 10/17/2024 dx: acute recurrent diverticulitis .    If you have any questions or concerns, please don't hesitate to call.         Sincerely,          Lea Reyes, MD

## 2024-10-16 ENCOUNTER — TELEPHONE (OUTPATIENT)
Age: 59
End: 2024-10-16

## 2024-10-16 NOTE — TELEPHONE ENCOUNTER
Patient calling because she still does not feel well and would like her work note extended till Monday RTW on the 21st... please advise

## 2024-10-21 ENCOUNTER — OFFICE VISIT (OUTPATIENT)
Dept: GASTROENTEROLOGY | Facility: CLINIC | Age: 59
End: 2024-10-21
Payer: COMMERCIAL

## 2024-10-21 VITALS
HEART RATE: 60 BPM | DIASTOLIC BLOOD PRESSURE: 66 MMHG | SYSTOLIC BLOOD PRESSURE: 110 MMHG | HEIGHT: 65 IN | BODY MASS INDEX: 29.99 KG/M2 | WEIGHT: 180 LBS

## 2024-10-21 DIAGNOSIS — K58.2 IRRITABLE BOWEL SYNDROME WITH BOTH CONSTIPATION AND DIARRHEA: ICD-10-CM

## 2024-10-21 DIAGNOSIS — K57.30 DIVERTICULOSIS LARGE INTESTINE W/O PERFORATION OR ABSCESS W/O BLEEDING: Primary | ICD-10-CM

## 2024-10-21 PROCEDURE — 99214 OFFICE O/P EST MOD 30 MIN: CPT | Performed by: INTERNAL MEDICINE

## 2024-10-21 NOTE — PROGRESS NOTES
Ambulatory Visit  Name: Estefani Olmstead      : 1965      MRN: [id number]  Encounter Provider: Lenora Franco DO  Encounter Date: 10/21/2024   Encounter department: [facility] [place]    Assessment & Plan  Diverticulosis large intestine w/o perforation or abscess w/o bleeding  She has had multiple bouts of diverticulitis which have been treated by primary care and have been uncomplicated, she would like to discuss with surgeon her options, I placed a referral  My colleague did colonoscopy in , reasonable to repeat in the setting of recurrent diverticulitis, but I will let my surgery colleagues decide given recurrent nature and patient interested in surgical options  Reasonable to avoid popcorn even though studies have shown does not increase the risk but her bouts of diverticulitis seem to occur after eating popcorn  Orders:    Ambulatory Referral to Colorectal Surgery; Future    Irritable bowel syndrome with both constipation and diarrhea  Is well-controlled currently         History of Present Illness     Estefani Olmstead is a 59 y.o. female who presents for evaluation.  She had a colonoscopy in  with diverticuluosis seen.      Just had a bout of diverticulitis treated with cipro/flagyl for 7 days.  Had low grade fever as well as lower quadrant pain.  Before this, had a bout of diverticulitis in March.    Had 4 bouts last year.  Never been hospitalized.  Thinks symptoms are due to popcorn.      Had an ER visit 4 years ago due to diverticulitis.      Also has constipation and uses miralax as needed.  Also eats vegetable soup homemade daily but avoids this when having a bout of diverticuliits.      History obtained from : patient  Review of Systems        Objective     Vitals:    10/21/24 1554   BP: 110/66   Pulse: 60         Physical Exam  Normocephalic, atraumatic, clear to auscultation bilaterally, regular rate rhythm, abdomen is soft mildly tender tender  in the left lower quadrant, no rebound no guarding

## 2024-10-21 NOTE — PATIENT INSTRUCTIONS
Scheduled date of EGD(as of today): 11/01/24  Physician performing EGD: Dr. Franco   Location of EGD:    Instructions reviewed with patient by: Bonnie  Clearances: none

## 2024-11-07 NOTE — PROGRESS NOTES
"Colon and Rectal Surgery   Estefani Olmstead 59 y.o. female MRN [id number]  Encounter: [id number]  11/11/24 2:17 PM            Assessment: Estefani Olmstead is a 59 y.o. female who has recurrent diverticulitis.      Plan:   Diverticulitis  The patient presents for evaluation of recurrent diverticulitis.  She has had 7 or 10 episodes over the last 3 to 4 years.  It always occurs in the left lower quadrant and she has had documented sigmoid diverticulitis on CT scan in 2021.  That scan was viewed.  Colonoscopy 2 years ago was relatively unremarkable except for a few sigmoid diverticula.  There was no stricture or narrowing.    She states that she has these episodes of abdominal pain in the left lower quadrant.  This \"wipes her out\".  She called her primary physician and gets antibiotics to treat the symptoms.  She states that the antibiotics take 10 days to create resolution of her symptoms.    This is a little bit difficult to plan for, given that the diverticulitis episodes are not documented over the years.  I am not sure that surgery is her best option yet.  However, should we document diverticulitis by an examination here or a CT scan from the emergency room, I think that surgery consideration would be reasonable.  On the other hand, if we can obtain resolution of symptoms with clear liquids and bowel rest at the time of that episode, she may learn a new method of treatment of the disease.  I explained recent studies have shown the potential for resolution that equals the benefits of use of antibiotics.  She understands and will try this and her next episode of the left lower quadrant pain.  She will do it immediately upon initiation of the pain.  She will try to get to us at the office or the emergency room for a CT scan evaluation should the pain recur.  If diverticulitis is documented oh again, and her symptoms persist at a relatively significant rate when the diverticulitis occurs, surgery may be her best option. "  I just hesitate to do it on my first visit with her with less information than we usually have.    I will see her back in 1 year to review her symptomatology and wellness.      Subjective     HPI    Estefani Olmstead is a 59 y.o. female who is referred by Dr. Lenora Franco for a surgical consultation for diverticulitis.     The patient had an episode of CT confirmed diverticulitis in 2/2021.     Since October of 2023 the patient has had three episodes of suspected diverticulitis. For each of episodes she was treated with antibiotics by her PCP. Her last suspected episode was in October of this year.    She has completed her most recent course of antibiotics. She reports improvement in her symptoms.     Last colonoscopy was performed on 09/26/2022 by Dr. Celestina Dupont with a 10 year recall.    Lab Results   Component Value Date    WBC 7.77 07/05/2024    HGB 12.5 07/05/2024    HCT 38.1 07/05/2024    MCV 87 07/05/2024     07/05/2024     Lab Results   Component Value Date    SODIUM 138 07/05/2024    K 4.0 07/05/2024     07/05/2024    CO2 26 07/05/2024    AGAP 10 07/05/2024    BUN 19 07/05/2024    CREATININE 0.80 07/05/2024    GLUC 93 07/05/2024    CALCIUM 9.8 07/05/2024    AST 18 05/19/2023    ALT 32 05/19/2023    ALKPHOS 71 05/19/2023    TP 7.8 05/19/2023    TBILI 0.60 05/19/2023    EGFR 79 07/05/2024     Historical Information   Past Medical History:   Diagnosis Date    Cervicalgia     last assessed: 10/23/2012    Colon polyp     Diverticula of colon     Diverticulitis 3/14/2022    Fibroid 01/2015    US reveals 1.8 cm rt intramural, 4.8 pedunculated fibroid rt adnexa    Insomnia      Past Surgical History:   Procedure Laterality Date    COLONOSCOPY      LAPAROSCOPIC CHOLECYSTECTOMY         Meds/Allergies       Current Outpatient Medications:     ALPRAZolam (XANAX) 0.25 mg tablet, Take 1 tablet (0.25 mg total) by mouth daily as needed for anxiety Do not take with alcohol. Do not drive after taking  this., Disp: 10 tablet, Rfl: 0    aspirin (ECOTRIN LOW STRENGTH) 81 mg EC tablet, Take 1 tablet (81 mg total) by mouth daily, Disp:  , Rfl:     b complex vitamins capsule, Take 1 capsule by mouth daily, Disp: , Rfl:     buPROPion (WELLBUTRIN XL) 150 mg 24 hr tablet, TAKE ONE TABLET BY MOUTH DAILY, Disp: 90 tablet, Rfl: 1    cetirizine (ZyrTEC) 10 mg tablet, Take 10 mg by mouth daily, Disp: , Rfl:     cholecalciferol (VITAMIN D3) 400 units tablet, Take 400 Units by mouth daily, Disp: , Rfl:     Lactobacillus (PROBIOTIC ACIDOPHILUS PO), Take by mouth, Disp: , Rfl:     levothyroxine 75 mcg tablet, TAKE ONE TABLET BY MOUTH DAILY, Disp: 90 tablet, Rfl: 1    metoprolol succinate (TOPROL-XL) 25 mg 24 hr tablet, Take 1 tablet (25 mg total) by mouth at bedtime, Disp: 30 tablet, Rfl: 3    Multiple Vitamin (MULTIVITAMINS PO), Take 1 tablet by mouth daily, Disp: , Rfl:     Omega-3 Fatty Acids (OMEGA-3 FISH OIL) 1200 MG CAPS, Take by mouth, Disp: , Rfl:     ondansetron (Zofran ODT) 4 mg disintegrating tablet, Take 1 tablet (4 mg total) by mouth every 6 (six) hours as needed for nausea or vomiting, Disp: 20 tablet, Rfl: 1    rosuvastatin (CRESTOR) 20 MG tablet, TAKE ONE TABLET BY MOUTH EVERY DAY, Disp: 90 tablet, Rfl: 1    vitamin E 100 UNIT capsule, Take 100 Units by mouth daily, Disp: , Rfl:     celecoxib (CeleBREX) 200 mg capsule, Take 1 capsule (200 mg total) by mouth 2 (two) times a day With food (Patient not taking: Reported on 10/2/2024), Disp: 30 capsule, Rfl: 0  Allergies   Allergen Reactions    Osphena [Ospemifene] Hives    Food     Nuts - Food Allergy     Pollen Extract     Sesame Seed (Diagnostic) - Food Allergy     Sulfamethoxazole-Trimethoprim Headache    Wheat Bran - Food Allergy        Social History   Social History     Substance and Sexual Activity   Drug Use No     Social History     Tobacco Use   Smoking Status Former    Current packs/day: 0.00    Average packs/day: 0.2 packs/day for 15.0 years (3.0 ttl  "pk-yrs)    Types: Cigarettes    Start date:     Quit date:     Years since quittin.8   Smokeless Tobacco Never         Family History   Problem Relation Age of Onset    Stroke Mother     Stroke Father     Dementia Sister     Alzheimer's disease Sister     Stroke Brother     No Known Problems Maternal Aunt     No Known Problems Maternal Aunt     No Known Problems Maternal Aunt     No Known Problems Maternal Aunt     No Known Problems Paternal Aunt     Breast cancer Cousin 50    Uterine cancer Cousin         76    Diabetes Family     Hyperlipidemia Family     Hypertension Family          Review of Systems   Constitutional: Negative.    Respiratory: Negative.     Cardiovascular: Negative.    Gastrointestinal: Negative.        Objective   Current Vitals:  Vitals:    24 1353   BP: 122/70   BP Location: Left arm   Patient Position: Sitting   Cuff Size: Adult   Weight: 81.6 kg (180 lb)   Height: 5' 5\" (1.651 m)         Physical Exam  Constitutional:       Appearance: Normal appearance.   Cardiovascular:      Rate and Rhythm: Normal rate and regular rhythm.   Pulmonary:      Effort: Pulmonary effort is normal.      Breath sounds: Normal breath sounds.   Abdominal:      General: Abdomen is flat.      Palpations: Abdomen is soft. There is no mass.      Tenderness: There is no abdominal tenderness. There is no guarding.   Neurological:      General: No focal deficit present.      Mental Status: She is alert and oriented to person, place, and time.   Psychiatric:         Mood and Affect: Mood normal.         Behavior: Behavior normal.               "

## 2024-11-11 ENCOUNTER — CONSULT (OUTPATIENT)
Age: 59
End: 2024-11-11
Payer: COMMERCIAL

## 2024-11-11 VITALS
HEIGHT: 65 IN | DIASTOLIC BLOOD PRESSURE: 70 MMHG | WEIGHT: 180 LBS | SYSTOLIC BLOOD PRESSURE: 122 MMHG | BODY MASS INDEX: 29.99 KG/M2

## 2024-11-11 DIAGNOSIS — K57.92 DIVERTICULITIS: Primary | ICD-10-CM

## 2024-11-11 DIAGNOSIS — K57.30 DIVERTICULOSIS LARGE INTESTINE W/O PERFORATION OR ABSCESS W/O BLEEDING: ICD-10-CM

## 2024-11-11 PROCEDURE — 99244 OFF/OP CNSLTJ NEW/EST MOD 40: CPT | Performed by: COLON & RECTAL SURGERY

## 2024-11-11 NOTE — ASSESSMENT & PLAN NOTE
"The patient presents for evaluation of recurrent diverticulitis.  She has had 7 or 10 episodes over the last 3 to 4 years.  It always occurs in the left lower quadrant and she has had documented sigmoid diverticulitis on CT scan in 2021.  That scan was viewed.  Colonoscopy 2 years ago was relatively unremarkable except for a few sigmoid diverticula.  There was no stricture or narrowing.    She states that she has these episodes of abdominal pain in the left lower quadrant.  This \"wipes her out\".  She called her primary physician and gets antibiotics to treat the symptoms.  She states that the antibiotics take 10 days to create resolution of her symptoms.    This is a little bit difficult to plan for, given that the diverticulitis episodes are not documented over the years.  I am not sure that surgery is her best option yet.  However, should we document diverticulitis by an examination here or a CT scan from the emergency room, I think that surgery consideration would be reasonable.  On the other hand, if we can obtain resolution of symptoms with clear liquids and bowel rest at the time of that episode, she may learn a new method of treatment of the disease.  I explained recent studies have shown the potential for resolution that equals the benefits of use of antibiotics.  She understands and will try this and her next episode of the left lower quadrant pain.  She will do it immediately upon initiation of the pain.  She will try to get to us at the office or the emergency room for a CT scan evaluation should the pain recur.  If diverticulitis is documented oh again, and her symptoms persist at a relatively significant rate when the diverticulitis occurs, surgery may be her best option.  I just hesitate to do it on my first visit with her with less information than we usually have.    I will see her back in 1 year to review her symptomatology and wellness.  "

## 2025-01-06 ENCOUNTER — TELEMEDICINE (OUTPATIENT)
Dept: INTERNAL MEDICINE CLINIC | Facility: CLINIC | Age: 60
End: 2025-01-06
Payer: COMMERCIAL

## 2025-01-06 DIAGNOSIS — U07.1 COVID-19: Primary | ICD-10-CM

## 2025-01-06 PROCEDURE — 99213 OFFICE O/P EST LOW 20 MIN: CPT | Performed by: INTERNAL MEDICINE

## 2025-01-06 RX ORDER — NIRMATRELVIR AND RITONAVIR 300-100 MG
3 KIT ORAL 2 TIMES DAILY
Qty: 30 TABLET | Refills: 0 | Status: SHIPPED | OUTPATIENT
Start: 2025-01-06 | End: 2025-01-11

## 2025-01-06 NOTE — PROGRESS NOTES
COVID-19 Outpatient Progress Note  Name: Estefnai Olmstead      : 1965      MRN: [id number]  Encounter Provider: Lea Reyes, MD  Encounter Date: 2025   Encounter department: [facility]    Assessment & Plan  COVID-19    Orders:  •  nirmatrelvir & ritonavir (Paxlovid, 300/100,) tablet therapy pack; Take 3 tablets by mouth 2 (two) times a day for 5 days Take 2 nirmatrelvir tablets + 1 ritonavir tablet together per dose      Disposition:     Discussed symptom directed medication options with patient.     I have spent a total time of 5 minutes on the day of the encounter for this patient including risks and benefits of treatment options and impressions.          Encounter provider: Lea Reyes, MD     Provider located at: [address] [address]     Recent Visits  No visits were found meeting these conditions.  Showing recent visits within past 7 days and meeting all other requirements  Today's Visits  Date Type Provider Dept   25 Telemedicine Lea Reyes, MD  [facility]   Showing today's visits and meeting all other requirements  Future Appointments  No visits were found meeting these conditions.  Showing future appointments within next 150 days and meeting all other requirements    History of Present Illness      This virtual check-in was done via [employer] and patient was informed that this is a secure, HIPAA-compliant platform. She agrees to proceed.    Patient agrees to participate in a virtual check in via telephone or video visit instead of presenting to the office to address urgent/immediate medical needs. Patient is aware this is a billable service. She acknowledged consent and understanding of privacy and security of the video platform. The patient has agreed to participate and understands they can discontinue the visit at any time.    After connecting through [employer], the patient was identified by name and  date of birth. Estefani Olmstead was informed that this was a telemedicine visit and that the exam was being conducted confidentially over secure lines. My office door was closed. No one else was in the room. Estefani Olmstead acknowledged consent and understanding of privacy and security of the telemedicine visit. I informed the patient that I have reviewed her record in Epic and presented the opportunity for her to ask any questions regarding the visit today. The patient agreed to participate.     Verification of patient location:  Patient is located in the following state in which I hold an active license: [place]    Subjective:   Estefani Olmstead is a 59 y.o. female who has been screened for COVID-19. Symptom change since last report: unchanged. Patient's symptoms include fever, chills, fatigue, nasal congestion, rhinorrhea, cough, diarrhea and myalgias.     - Date of symptom onset: 1/4/2025  - Date of positive COVID-19 test: 1/5/2025. Type of test: Home antigen. Patient with typical symptoms of COVID-19 and they attest that they were positive on home rapid antigen testing. Image of positive result is not able to be uploaded into their chart.     COVID-19 vaccination status: Fully vaccinated with booster    Lab Results   Component Value Date    SARSCOV2 Not Detected 07/09/2020       Review of Systems   Constitutional:  Positive for chills, fatigue and fever.   HENT:  Positive for congestion and rhinorrhea.    Respiratory:  Positive for cough.    Gastrointestinal:  Positive for diarrhea.   Musculoskeletal:  Positive for myalgias.     Objective   There were no vitals taken for this visit.    Physical Exam  Constitutional:       General: She is not in acute distress.     Appearance: She is ill-appearing.   Pulmonary:      Effort: No respiratory distress.

## 2025-01-23 ENCOUNTER — APPOINTMENT (OUTPATIENT)
Dept: LAB | Facility: CLINIC | Age: 60
End: 2025-01-23
Payer: COMMERCIAL

## 2025-01-23 DIAGNOSIS — E78.00 HYPERCHOLESTEROLEMIA: ICD-10-CM

## 2025-01-23 DIAGNOSIS — E03.9 ACQUIRED HYPOTHYROIDISM: ICD-10-CM

## 2025-01-23 DIAGNOSIS — R73.01 IMPAIRED FASTING BLOOD SUGAR: ICD-10-CM

## 2025-01-23 LAB
ALBUMIN SERPL BCG-MCNC: 4.5 G/DL (ref 3.5–5)
ALP SERPL-CCNC: 62 U/L (ref 34–104)
ALT SERPL W P-5'-P-CCNC: 25 U/L (ref 7–52)
ANION GAP SERPL CALCULATED.3IONS-SCNC: 10 MMOL/L (ref 4–13)
AST SERPL W P-5'-P-CCNC: 21 U/L (ref 13–39)
BASOPHILS # BLD AUTO: 0.03 THOUSANDS/ΜL (ref 0–0.1)
BASOPHILS NFR BLD AUTO: 1 % (ref 0–1)
BILIRUB SERPL-MCNC: 0.47 MG/DL (ref 0.2–1)
BUN SERPL-MCNC: 15 MG/DL (ref 5–25)
CALCIUM SERPL-MCNC: 9.5 MG/DL (ref 8.4–10.2)
CHLORIDE SERPL-SCNC: 104 MMOL/L (ref 96–108)
CHOLEST SERPL-MCNC: 195 MG/DL (ref ?–200)
CO2 SERPL-SCNC: 26 MMOL/L (ref 21–32)
CREAT SERPL-MCNC: 0.79 MG/DL (ref 0.6–1.3)
EOSINOPHIL # BLD AUTO: 0.15 THOUSAND/ΜL (ref 0–0.61)
EOSINOPHIL NFR BLD AUTO: 3 % (ref 0–6)
ERYTHROCYTE [DISTWIDTH] IN BLOOD BY AUTOMATED COUNT: 13.9 % (ref 11.6–15.1)
EST. AVERAGE GLUCOSE BLD GHB EST-MCNC: 137 MG/DL
GFR SERPL CREATININE-BSD FRML MDRD: 81 ML/MIN/1.73SQ M
GLUCOSE P FAST SERPL-MCNC: 93 MG/DL (ref 65–99)
HBA1C MFR BLD: 6.4 %
HCT VFR BLD AUTO: 39.1 % (ref 34.8–46.1)
HDLC SERPL-MCNC: 49 MG/DL
HGB BLD-MCNC: 12.1 G/DL (ref 11.5–15.4)
IMM GRANULOCYTES # BLD AUTO: 0.02 THOUSAND/UL (ref 0–0.2)
IMM GRANULOCYTES NFR BLD AUTO: 0 % (ref 0–2)
LDLC SERPL CALC-MCNC: 84 MG/DL (ref 0–100)
LYMPHOCYTES # BLD AUTO: 1.75 THOUSANDS/ΜL (ref 0.6–4.47)
LYMPHOCYTES NFR BLD AUTO: 32 % (ref 14–44)
MCH RBC QN AUTO: 28.1 PG (ref 26.8–34.3)
MCHC RBC AUTO-ENTMCNC: 30.9 G/DL (ref 31.4–37.4)
MCV RBC AUTO: 91 FL (ref 82–98)
MONOCYTES # BLD AUTO: 0.47 THOUSAND/ΜL (ref 0.17–1.22)
MONOCYTES NFR BLD AUTO: 9 % (ref 4–12)
NEUTROPHILS # BLD AUTO: 3.02 THOUSANDS/ΜL (ref 1.85–7.62)
NEUTS SEG NFR BLD AUTO: 55 % (ref 43–75)
NONHDLC SERPL-MCNC: 146 MG/DL
NRBC BLD AUTO-RTO: 0 /100 WBCS
PLATELET # BLD AUTO: 350 THOUSANDS/UL (ref 149–390)
PMV BLD AUTO: 10.4 FL (ref 8.9–12.7)
POTASSIUM SERPL-SCNC: 4.4 MMOL/L (ref 3.5–5.3)
PROT SERPL-MCNC: 7 G/DL (ref 6.4–8.4)
RBC # BLD AUTO: 4.3 MILLION/UL (ref 3.81–5.12)
SODIUM SERPL-SCNC: 140 MMOL/L (ref 135–147)
TRIGL SERPL-MCNC: 310 MG/DL (ref ?–150)
TSH SERPL DL<=0.05 MIU/L-ACNC: 3.86 UIU/ML (ref 0.45–4.5)
WBC # BLD AUTO: 5.44 THOUSAND/UL (ref 4.31–10.16)

## 2025-01-23 PROCEDURE — 36415 COLL VENOUS BLD VENIPUNCTURE: CPT

## 2025-01-23 PROCEDURE — 80053 COMPREHEN METABOLIC PANEL: CPT

## 2025-01-23 PROCEDURE — 83036 HEMOGLOBIN GLYCOSYLATED A1C: CPT

## 2025-01-23 PROCEDURE — 85025 COMPLETE CBC W/AUTO DIFF WBC: CPT

## 2025-01-23 PROCEDURE — 84443 ASSAY THYROID STIM HORMONE: CPT

## 2025-01-23 PROCEDURE — 80061 LIPID PANEL: CPT

## 2025-01-27 ENCOUNTER — OFFICE VISIT (OUTPATIENT)
Dept: INTERNAL MEDICINE CLINIC | Facility: CLINIC | Age: 60
End: 2025-01-27
Payer: COMMERCIAL

## 2025-01-27 VITALS
HEART RATE: 73 BPM | TEMPERATURE: 97.8 F | SYSTOLIC BLOOD PRESSURE: 110 MMHG | WEIGHT: 179.8 LBS | OXYGEN SATURATION: 96 % | BODY MASS INDEX: 29.96 KG/M2 | HEIGHT: 65 IN | DIASTOLIC BLOOD PRESSURE: 64 MMHG

## 2025-01-27 DIAGNOSIS — E03.9 ACQUIRED HYPOTHYROIDISM: ICD-10-CM

## 2025-01-27 DIAGNOSIS — E78.00 HYPERCHOLESTEROLEMIA: ICD-10-CM

## 2025-01-27 DIAGNOSIS — I10 PRIMARY HYPERTENSION: Primary | ICD-10-CM

## 2025-01-27 DIAGNOSIS — R73.01 IMPAIRED FASTING BLOOD SUGAR: ICD-10-CM

## 2025-01-27 DIAGNOSIS — K57.92 DIVERTICULITIS: ICD-10-CM

## 2025-01-27 PROCEDURE — 99214 OFFICE O/P EST MOD 30 MIN: CPT | Performed by: INTERNAL MEDICINE

## 2025-01-27 NOTE — PROGRESS NOTES
Name: Estefani Olmstead      : 1965      MRN: [id number]  Encounter Provider: Lea Reyes, MD  Encounter Date: 2025   [facility]    Assessment & Plan  Primary hypertension  Controlled on current metoprolol  Orders:  •  CBC and differential; Future  •  Comprehensive metabolic panel; Future    Acquired hypothyroidism  Controlled on levothyroxine 75 mcg  Orders:  •  TSH, 3rd generation with Free T4 reflex; Future    Hypercholesterolemia  High triglyceride with weight gain  Discussed dietary changes  LDL controlled on rosuvastatin  Orders:  •  Lipid panel; Future    Impaired fasting blood sugar  Much higher A1c  Discussed dietary changes to improve this  Orders:  •  Hemoglobin A1C; Future    Diverticulitis  Recurrent episodes  She saw colorectal surgery and recommended to treat very conservatively, no antibiotics  We agreed that she will go on a low residue diet, lots of fluids if she experiences the pain again  If this fails she will get the CT scan before starting any antibiotic treatment       History of Present Illness     For a follow-up  Recent COVID infection and had nausea with the Paxlovid.  She is to finish the course  Overall better but still has a little cough  Recent labs reviewed and A1c, triglycerides are high      Review of Systems   Constitutional:  Positive for unexpected weight change (Weight gain).   Respiratory:  Positive for cough. Negative for shortness of breath.    Cardiovascular:  Negative for chest pain and palpitations.   Gastrointestinal:  Positive for constipation. Negative for abdominal pain.     Past Medical History:   Diagnosis Date   • Cervicalgia     last assessed: 10/23/2012   • Colon polyp    • Diverticula of colon    • Diverticulitis 3/14/2022   • Fibroid 2015    US reveals 1.8 cm rt intramural, 4.8 pedunculated fibroid rt adnexa   • Insomnia      Past Surgical History:   Procedure Laterality Date   • COLONOSCOPY     •  LAPAROSCOPIC CHOLECYSTECTOMY       Family History   Problem Relation Age of Onset   • Stroke Mother    • Stroke Father    • Dementia Sister    • Alzheimer's disease Sister    • Stroke Brother    • No Known Problems Maternal Aunt    • No Known Problems Maternal Aunt    • No Known Problems Maternal Aunt    • No Known Problems Maternal Aunt    • No Known Problems Paternal Aunt    • Breast cancer Cousin 50   • Uterine cancer Cousin         76   • Diabetes Family    • Hyperlipidemia Family    • Hypertension Family      Social History     Tobacco Use   • Smoking status: Former     Current packs/day: 0.00     Average packs/day: 0.2 packs/day for 15.0 years (3.0 ttl pk-yrs)     Types: Cigarettes     Start date:      Quit date:      Years since quittin.0   • Smokeless tobacco: Never   Vaping Use   • Vaping status: Never Used   Substance and Sexual Activity   • Alcohol use: Yes     Alcohol/week: 4.0 standard drinks of alcohol     Types: 4 Standard drinks or equivalent per week     Comment: social   • Drug use: No   • Sexual activity: Not Currently     Birth control/protection: Post-menopausal     Current Outpatient Medications on File Prior to Visit   Medication Sig   • ALPRAZolam (XANAX) 0.25 mg tablet Take 1 tablet (0.25 mg total) by mouth daily as needed for anxiety Do not take with alcohol. Do not drive after taking this.   • aspirin (ECOTRIN LOW STRENGTH) 81 mg EC tablet Take 1 tablet (81 mg total) by mouth daily   • b complex vitamins capsule Take 1 capsule by mouth daily   • buPROPion (WELLBUTRIN XL) 150 mg 24 hr tablet TAKE ONE TABLET BY MOUTH DAILY   • cetirizine (ZyrTEC) 10 mg tablet Take 10 mg by mouth daily   • cholecalciferol (VITAMIN D3) 400 units tablet Take 400 Units by mouth daily   • Lactobacillus (PROBIOTIC ACIDOPHILUS PO) Take by mouth   • levothyroxine 75 mcg tablet TAKE ONE TABLET BY MOUTH DAILY   • metoprolol succinate (TOPROL-XL) 25 mg 24 hr tablet Take 1 tablet (25 mg total) by mouth at  "bedtime   • Multiple Vitamin (MULTIVITAMINS PO) Take 1 tablet by mouth daily   • Omega-3 Fatty Acids (OMEGA-3 FISH OIL) 1200 MG CAPS Take by mouth   • ondansetron (Zofran ODT) 4 mg disintegrating tablet Take 1 tablet (4 mg total) by mouth every 6 (six) hours as needed for nausea or vomiting   • rosuvastatin (CRESTOR) 20 MG tablet TAKE ONE TABLET BY MOUTH EVERY DAY   • vitamin E 100 UNIT capsule Take 100 Units by mouth daily   • celecoxib (CeleBREX) 200 mg capsule Take 1 capsule (200 mg total) by mouth 2 (two) times a day With food (Patient not taking: Reported on 10/2/2024)     Allergies   Allergen Reactions   • Osphena [Ospemifene] Hives   • Food    • Nuts - Food Allergy    • Pollen Extract    • Sesame Seed (Diagnostic) - Food Allergy    • Sulfamethoxazole-Trimethoprim Headache   • Wheat Bran - Food Allergy      Immunization History   Administered Date(s) Administered   • COVID-19 PFIZER VACCINE 0.3 ML IM 12/23/2020, 01/11/2021, 10/09/2021   • INFLUENZA 09/19/2016, 10/08/2018, 10/06/2022   • Influenza Quadrivalent, 6-35 Months IM 09/19/2016   • Influenza, recombinant, quadrivalent,injectable, preservative free 10/13/2020   • Tdap 10/13/2020   • Zoster Vaccine Recombinant 06/04/2021, 08/27/2021     Objective   /64 (BP Location: Left arm, Patient Position: Sitting, Cuff Size: Standard)   Pulse 73   Temp 97.8 °F (36.6 °C) (Tympanic)   Ht 5' 5\" (1.651 m)   Wt 81.6 kg (179 lb 12.8 oz)   SpO2 96%   BMI 29.92 kg/m²     Physical Exam  Constitutional:       General: She is not in acute distress.     Appearance: She is well-developed. She is not ill-appearing, toxic-appearing or diaphoretic.   Eyes:      Conjunctiva/sclera: Conjunctivae normal.   Cardiovascular:      Rate and Rhythm: Normal rate and regular rhythm.      Heart sounds: Normal heart sounds. No murmur heard.  Pulmonary:      Effort: Pulmonary effort is normal. No respiratory distress.      Breath sounds: Normal breath sounds. No stridor. No wheezing " or rales.   Abdominal:      General: There is no distension.      Palpations: Abdomen is soft. There is no mass.      Tenderness: There is no abdominal tenderness. There is no guarding or rebound.   Musculoskeletal:      Cervical back: Neck supple.      Right lower leg: No edema.      Left lower leg: No edema.   Neurological:      Mental Status: She is alert and oriented to person, place, and time.   Psychiatric:         Mood and Affect: Mood normal.         Behavior: Behavior normal.         Thought Content: Thought content normal.         Judgment: Judgment normal.

## 2025-01-27 NOTE — ASSESSMENT & PLAN NOTE
Controlled on current metoprolol  Orders:  •  CBC and differential; Future  •  Comprehensive metabolic panel; Future

## 2025-01-27 NOTE — ASSESSMENT & PLAN NOTE
High triglyceride with weight gain  Discussed dietary changes  LDL controlled on rosuvastatin  Orders:  •  Lipid panel; Future

## 2025-02-05 ENCOUNTER — ANNUAL EXAM (OUTPATIENT)
Dept: OBGYN CLINIC | Facility: CLINIC | Age: 60
End: 2025-02-05
Payer: COMMERCIAL

## 2025-02-05 VITALS
DIASTOLIC BLOOD PRESSURE: 76 MMHG | WEIGHT: 178.8 LBS | HEIGHT: 65 IN | SYSTOLIC BLOOD PRESSURE: 120 MMHG | BODY MASS INDEX: 29.79 KG/M2

## 2025-02-05 DIAGNOSIS — N95.2 VAGINAL ATROPHY: ICD-10-CM

## 2025-02-05 DIAGNOSIS — Z12.31 ENCOUNTER FOR SCREENING MAMMOGRAM FOR MALIGNANT NEOPLASM OF BREAST: ICD-10-CM

## 2025-02-05 DIAGNOSIS — Z01.419 ENCOUNTER FOR ANNUAL ROUTINE GYNECOLOGICAL EXAMINATION: Primary | ICD-10-CM

## 2025-02-05 PROCEDURE — S0612 ANNUAL GYNECOLOGICAL EXAMINA: HCPCS | Performed by: OBSTETRICS & GYNECOLOGY

## 2025-02-05 PROCEDURE — G0145 SCR C/V CYTO,THINLAYER,RESCR: HCPCS | Performed by: OBSTETRICS & GYNECOLOGY

## 2025-02-05 NOTE — PROGRESS NOTES
Name: Estefani Olmstead      : 1965      MRN: [id number]  Encounter Provider: Iwona Chandler DO  Encounter Date: 2025   Encounter department: [facility]  :  Assessment & Plan  Encounter for annual routine gynecological examination         Encounter for screening mammogram for malignant neoplasm of breast         Vaginal atrophy         Pap smear done for cytology, reflex HPV.  Encouraged self breast examination as well as calcium supplementation.  Continue annual mammogram, scheduled for .  Reviewed colon cancer screening, up to date.  She will continue to follow-up with primary care as scheduled.  Return to office in 1 year or as needed    History of Present Illness   HPI  Estefani Olmstead is a 59 y.o. female who presents     This is a pleasant 59-year-old female G0 who presents for her GYN exam.  She went through menopause at age 45.  She has never been on hormone replacement therapy.  She denies any vaginal bleeding or spotting.  No changes in bowel or bladder function.  She has been in a monogamous relationship for over 29 years.  Pap smears have been normal.    She does follow-up with her family physician on a regular basis.    She does have a history of diverticulitis, several episodes.  She was evaluated by colorectal with recommendations for conservative management.    Over the course of the year, her  was hospitalized for sepsis for over 1 month.  During his hospitalization he did go through alcohol withdrawal.  She states unfortunately he has resumed his alcohol behaviors.  Her brother had also passed away in the course of the year at age 77, healthy, significant stroke.      Colon     Mammo 2024, scheduled     Pap     Pelvic ultrasound , 4.5 x 2.8 pedunculated fibroid extending to the right, normal endometrial stripe    [facility] molecular screening 2024, normal    History obtained from: patient    Review of Systems   Constitutional:  Negative for  fatigue, fever and unexpected weight change.   Respiratory:  Negative for cough, chest tightness, shortness of breath and wheezing.    Cardiovascular: Negative.  Negative for chest pain and palpitations.   Gastrointestinal: Negative.  Negative for abdominal distention, abdominal pain, blood in stool, constipation, diarrhea, nausea and vomiting.   Genitourinary: Negative.  Negative for difficulty urinating, dyspareunia, dysuria, flank pain, frequency, genital sores, hematuria, pelvic pain, urgency, vaginal bleeding, vaginal discharge and vaginal pain.   Skin:  Negative for rash.     Current Outpatient Medications on File Prior to Visit   Medication Sig Dispense Refill    aspirin (ECOTRIN LOW STRENGTH) 81 mg EC tablet Take 1 tablet (81 mg total) by mouth daily      b complex vitamins capsule Take 1 capsule by mouth daily      buPROPion (WELLBUTRIN XL) 150 mg 24 hr tablet TAKE ONE TABLET BY MOUTH DAILY 90 tablet 1    cholecalciferol (VITAMIN D3) 400 units tablet Take 400 Units by mouth daily      Lactobacillus (PROBIOTIC ACIDOPHILUS PO) Take by mouth      levothyroxine 75 mcg tablet TAKE ONE TABLET BY MOUTH DAILY 90 tablet 1    metoprolol succinate (TOPROL-XL) 25 mg 24 hr tablet Take 1 tablet (25 mg total) by mouth at bedtime 30 tablet 3    Multiple Vitamin (MULTIVITAMINS PO) Take 1 tablet by mouth daily      Omega-3 Fatty Acids (OMEGA-3 FISH OIL) 1200 MG CAPS Take by mouth      ondansetron (Zofran ODT) 4 mg disintegrating tablet Take 1 tablet (4 mg total) by mouth every 6 (six) hours as needed for nausea or vomiting 20 tablet 1    rosuvastatin (CRESTOR) 20 MG tablet TAKE ONE TABLET BY MOUTH EVERY DAY 90 tablet 1    vitamin E 100 UNIT capsule Take 100 Units by mouth daily      ALPRAZolam (XANAX) 0.25 mg tablet Take 1 tablet (0.25 mg total) by mouth daily as needed for anxiety Do not take with alcohol. Do not drive after taking this. (Patient not taking: Reported on 2/5/2025) 10 tablet 0    celecoxib (CeleBREX) 200 mg  "capsule Take 1 capsule (200 mg total) by mouth 2 (two) times a day With food (Patient not taking: Reported on 10/2/2024) 30 capsule 0    cetirizine (ZyrTEC) 10 mg tablet Take 10 mg by mouth daily (Patient not taking: Reported on 2/5/2025)       No current facility-administered medications on file prior to visit.         Objective   /76   Ht 5' 5\" (1.651 m)   Wt 81.1 kg (178 lb 12.8 oz)   BMI 29.75 kg/m²      Physical Exam  Constitutional:       Appearance: Normal appearance. She is well-developed.   HENT:      Head: Normocephalic and atraumatic.   Cardiovascular:      Rate and Rhythm: Normal rate and regular rhythm.   Pulmonary:      Effort: Pulmonary effort is normal.      Breath sounds: Normal breath sounds.   Chest:   Breasts:     Right: No inverted nipple, mass, nipple discharge, skin change or tenderness.      Left: No inverted nipple, mass, nipple discharge, skin change or tenderness.   Abdominal:      General: Bowel sounds are normal. There is no distension.      Palpations: Abdomen is soft.      Tenderness: There is no abdominal tenderness. There is no guarding or rebound.   Genitourinary:     Labia:         Right: No rash, tenderness or lesion.         Left: No rash, tenderness or lesion.       Vagina: Normal. No signs of injury. No vaginal discharge or tenderness.      Cervix: No cervical motion tenderness, discharge, friability, lesion or cervical bleeding.      Uterus: Not enlarged and not tender.       Adnexa:         Right: No mass, tenderness or fullness.          Left: No mass, tenderness or fullness.     Neurological:      Mental Status: She is alert.   Psychiatric:         Behavior: Behavior normal.     Using pediatric speculum, difficult for patient to tolerate.  The vagina is evident of slight estrogen deficiency.  No pelvic floor prolapse.    Administrative Statements   I have spent a total time of 30 minutes in caring for this patient on the day of the visit/encounter including " Impressions, Counseling / Coordination of care, Documenting in the medical record, Reviewing / ordering tests, medicine, procedures  , and Obtaining or reviewing history  .

## 2025-02-10 LAB
LAB AP GYN PRIMARY INTERPRETATION: NORMAL
Lab: NORMAL

## 2025-02-28 ENCOUNTER — HOSPITAL ENCOUNTER (OUTPATIENT)
Dept: RADIOLOGY | Age: 60
Discharge: HOME | End: 2025-02-28
Payer: COMMERCIAL

## 2025-02-28 VITALS — HEIGHT: 65 IN | WEIGHT: 178 LBS | BODY MASS INDEX: 29.66 KG/M2

## 2025-02-28 DIAGNOSIS — Z12.31 ENCOUNTER FOR SCREENING MAMMOGRAM FOR MALIGNANT NEOPLASM OF BREAST: ICD-10-CM

## 2025-02-28 PROCEDURE — 77063 BREAST TOMOSYNTHESIS BI: CPT

## 2025-02-28 PROCEDURE — 77067 SCR MAMMO BI INCL CAD: CPT

## 2025-03-16 DIAGNOSIS — E03.9 HYPOTHYROIDISM, UNSPECIFIED TYPE: ICD-10-CM

## 2025-03-16 RX ORDER — LEVOTHYROXINE SODIUM 75 UG/1
75 TABLET ORAL DAILY
Qty: 90 TABLET | Refills: 1 | Status: SHIPPED | OUTPATIENT
Start: 2025-03-16 | End: 2025-03-17 | Stop reason: SDUPTHER

## 2025-03-17 DIAGNOSIS — E03.9 HYPOTHYROIDISM, UNSPECIFIED TYPE: ICD-10-CM

## 2025-03-17 RX ORDER — LEVOTHYROXINE SODIUM 75 UG/1
75 TABLET ORAL DAILY
Qty: 90 TABLET | Refills: 1 | Status: SHIPPED | OUTPATIENT
Start: 2025-03-17

## 2025-03-18 ENCOUNTER — TELEPHONE (OUTPATIENT)
Age: 60
End: 2025-03-18

## 2025-03-18 NOTE — TELEPHONE ENCOUNTER
Patient called to check on status of intermittent FLMA paperwork she faxed over on 3/14. I confirmed the fax number with her. She will fax again in case it wasn't received. I did explain that it can take 7 or more business days for the doctor to fill out forms. Please ignore new fax if it is a duplicate.

## 2025-03-23 DIAGNOSIS — E78.00 HYPERCHOLESTEROLEMIA: ICD-10-CM

## 2025-03-24 DIAGNOSIS — E78.00 HYPERCHOLESTEROLEMIA: ICD-10-CM

## 2025-03-24 RX ORDER — ROSUVASTATIN CALCIUM 20 MG/1
20 TABLET, COATED ORAL DAILY
Qty: 90 TABLET | Refills: 1 | Status: SHIPPED | OUTPATIENT
Start: 2025-03-24

## 2025-03-25 RX ORDER — ROSUVASTATIN CALCIUM 20 MG/1
20 TABLET, COATED ORAL DAILY
Qty: 90 TABLET | Refills: 0 | OUTPATIENT
Start: 2025-03-25

## 2025-03-31 DIAGNOSIS — I47.10 PSVT (PAROXYSMAL SUPRAVENTRICULAR TACHYCARDIA) (HCC): ICD-10-CM

## 2025-03-31 RX ORDER — METOPROLOL SUCCINATE 25 MG/1
25 TABLET, EXTENDED RELEASE ORAL
Qty: 30 TABLET | Refills: 5 | Status: SHIPPED | OUTPATIENT
Start: 2025-03-31

## 2025-03-31 NOTE — TELEPHONE ENCOUNTER
Reason for call:   [x] Refill   [] Prior Auth  [] Other:     Office:   [] PCP/Provider -   [x] Specialty/Provider - Cardio    Medication: metoprolol succinate (TOPROL-XL) 25 mg     Dose/Frequency: Take 1 tablet (25 mg total) by mouth at bedtime     Quantity: 30    Pharmacy: [facility] - [place], [place] - [address] [id number] [address]     Local Pharmacy   Does the patient have enough for 3 days?   [] Yes   [x] No - Send as HP to POD    Mail Away Pharmacy   Does the patient have enough for 10 days?   [] Yes   [] No - Send as HP to POD

## 2025-06-25 ENCOUNTER — OFFICE VISIT (OUTPATIENT)
Dept: CARDIOLOGY CLINIC | Facility: CLINIC | Age: 60
End: 2025-06-25
Payer: COMMERCIAL

## 2025-06-25 ENCOUNTER — APPOINTMENT (OUTPATIENT)
Dept: LAB | Facility: CLINIC | Age: 60
End: 2025-06-25
Attending: INTERNAL MEDICINE
Payer: COMMERCIAL

## 2025-06-25 ENCOUNTER — APPOINTMENT (OUTPATIENT)
Dept: LAB | Facility: CLINIC | Age: 60
End: 2025-06-25
Attending: PREVENTIVE MEDICINE
Payer: COMMERCIAL

## 2025-06-25 VITALS
SYSTOLIC BLOOD PRESSURE: 118 MMHG | WEIGHT: 182 LBS | DIASTOLIC BLOOD PRESSURE: 70 MMHG | HEART RATE: 65 BPM | HEIGHT: 65 IN | BODY MASS INDEX: 30.32 KG/M2

## 2025-06-25 DIAGNOSIS — G47.33 OBSTRUCTIVE SLEEP APNEA: ICD-10-CM

## 2025-06-25 DIAGNOSIS — E78.00 HYPERCHOLESTEROLEMIA: ICD-10-CM

## 2025-06-25 DIAGNOSIS — E66.3 OVERWEIGHT (BMI 25.0-29.9): ICD-10-CM

## 2025-06-25 DIAGNOSIS — I47.10 PSVT (PAROXYSMAL SUPRAVENTRICULAR TACHYCARDIA) (HCC): Primary | ICD-10-CM

## 2025-06-25 DIAGNOSIS — R00.2 PALPITATIONS: ICD-10-CM

## 2025-06-25 DIAGNOSIS — I10 HYPERTENSION, UNSPECIFIED TYPE: ICD-10-CM

## 2025-06-25 DIAGNOSIS — R73.01 IMPAIRED FASTING BLOOD SUGAR: ICD-10-CM

## 2025-06-25 DIAGNOSIS — R73.03 PREDIABETES: ICD-10-CM

## 2025-06-25 DIAGNOSIS — I10 PRIMARY HYPERTENSION: ICD-10-CM

## 2025-06-25 DIAGNOSIS — I47.19 PAT (PAROXYSMAL ATRIAL TACHYCARDIA) (HCC): ICD-10-CM

## 2025-06-25 DIAGNOSIS — E66.9 OBESITY (BMI 30-39.9): ICD-10-CM

## 2025-06-25 DIAGNOSIS — E03.9 ACQUIRED HYPOTHYROIDISM: ICD-10-CM

## 2025-06-25 DIAGNOSIS — Z00.8 ENCOUNTER FOR OTHER GENERAL EXAMINATION: ICD-10-CM

## 2025-06-25 LAB
ALBUMIN SERPL BCG-MCNC: 4.5 G/DL (ref 3.5–5)
ALP SERPL-CCNC: 57 U/L (ref 34–104)
ALT SERPL W P-5'-P-CCNC: 21 U/L (ref 7–52)
ANION GAP SERPL CALCULATED.3IONS-SCNC: 8 MMOL/L (ref 4–13)
AST SERPL W P-5'-P-CCNC: 21 U/L (ref 13–39)
ATRIAL RATE: 65 BPM
BASOPHILS # BLD AUTO: 0.03 THOUSANDS/ÂΜL (ref 0–0.1)
BASOPHILS NFR BLD AUTO: 1 % (ref 0–1)
BILIRUB SERPL-MCNC: 0.53 MG/DL (ref 0.2–1)
BUN SERPL-MCNC: 17 MG/DL (ref 5–25)
CALCIUM SERPL-MCNC: 9.3 MG/DL (ref 8.4–10.2)
CHLORIDE SERPL-SCNC: 106 MMOL/L (ref 96–108)
CHOLEST SERPL-MCNC: 191 MG/DL (ref ?–200)
CO2 SERPL-SCNC: 25 MMOL/L (ref 21–32)
CREAT SERPL-MCNC: 0.67 MG/DL (ref 0.6–1.3)
EOSINOPHIL # BLD AUTO: 0.17 THOUSAND/ÂΜL (ref 0–0.61)
EOSINOPHIL NFR BLD AUTO: 3 % (ref 0–6)
ERYTHROCYTE [DISTWIDTH] IN BLOOD BY AUTOMATED COUNT: 13.6 % (ref 11.6–15.1)
EST. AVERAGE GLUCOSE BLD GHB EST-MCNC: 131 MG/DL
GFR SERPL CREATININE-BSD FRML MDRD: 94 ML/MIN/1.73SQ M
GLUCOSE P FAST SERPL-MCNC: 105 MG/DL (ref 65–99)
HBA1C MFR BLD: 6.2 %
HCT VFR BLD AUTO: 36.5 % (ref 34.8–46.1)
HDLC SERPL-MCNC: 47 MG/DL
HGB BLD-MCNC: 12.1 G/DL (ref 11.5–15.4)
IMM GRANULOCYTES # BLD AUTO: 0.02 THOUSAND/UL (ref 0–0.2)
IMM GRANULOCYTES NFR BLD AUTO: 0 % (ref 0–2)
LDLC SERPL CALC-MCNC: 98 MG/DL (ref 0–100)
LYMPHOCYTES # BLD AUTO: 1.8 THOUSANDS/ÂΜL (ref 0.6–4.47)
LYMPHOCYTES NFR BLD AUTO: 33 % (ref 14–44)
MCH RBC QN AUTO: 28.7 PG (ref 26.8–34.3)
MCHC RBC AUTO-ENTMCNC: 33.2 G/DL (ref 31.4–37.4)
MCV RBC AUTO: 87 FL (ref 82–98)
MONOCYTES # BLD AUTO: 0.54 THOUSAND/ÂΜL (ref 0.17–1.22)
MONOCYTES NFR BLD AUTO: 10 % (ref 4–12)
NEUTROPHILS # BLD AUTO: 2.86 THOUSANDS/ÂΜL (ref 1.85–7.62)
NEUTS SEG NFR BLD AUTO: 53 % (ref 43–75)
NONHDLC SERPL-MCNC: 144 MG/DL
NRBC BLD AUTO-RTO: 0 /100 WBCS
P AXIS: 58 DEGREES
PLATELET # BLD AUTO: 265 THOUSANDS/UL (ref 149–390)
PMV BLD AUTO: 10.4 FL (ref 8.9–12.7)
POTASSIUM SERPL-SCNC: 4 MMOL/L (ref 3.5–5.3)
PR INTERVAL: 132 MS
PROT SERPL-MCNC: 7.2 G/DL (ref 6.4–8.4)
QRS AXIS: 53 DEGREES
QRSD INTERVAL: 82 MS
QT INTERVAL: 404 MS
QTC INTERVAL: 420 MS
RBC # BLD AUTO: 4.22 MILLION/UL (ref 3.81–5.12)
SODIUM SERPL-SCNC: 139 MMOL/L (ref 135–147)
T WAVE AXIS: 27 DEGREES
TRIGL SERPL-MCNC: 232 MG/DL (ref ?–150)
TSH SERPL DL<=0.05 MIU/L-ACNC: 2.07 UIU/ML (ref 0.45–4.5)
VENTRICULAR RATE: 65 BPM
WBC # BLD AUTO: 5.42 THOUSAND/UL (ref 4.31–10.16)

## 2025-06-25 PROCEDURE — 99214 OFFICE O/P EST MOD 30 MIN: CPT | Performed by: INTERNAL MEDICINE

## 2025-06-25 PROCEDURE — 93000 ELECTROCARDIOGRAM COMPLETE: CPT | Performed by: INTERNAL MEDICINE

## 2025-06-25 PROCEDURE — 80053 COMPREHEN METABOLIC PANEL: CPT

## 2025-06-25 PROCEDURE — 80061 LIPID PANEL: CPT

## 2025-06-25 PROCEDURE — 85025 COMPLETE CBC W/AUTO DIFF WBC: CPT

## 2025-06-25 PROCEDURE — 36415 COLL VENOUS BLD VENIPUNCTURE: CPT

## 2025-06-25 PROCEDURE — 83036 HEMOGLOBIN GLYCOSYLATED A1C: CPT

## 2025-06-25 PROCEDURE — 84443 ASSAY THYROID STIM HORMONE: CPT

## 2025-06-25 RX ORDER — FENOFIBRATE 145 MG/1
145 TABLET, FILM COATED ORAL DAILY
Qty: 30 TABLET | Refills: 3 | Status: CANCELLED | OUTPATIENT
Start: 2025-06-25

## 2025-06-25 NOTE — ASSESSMENT & PLAN NOTE
Several years of palpitations. Labs without clear aggravating factors. On buproprion since 2021. Holter with symptomatic PSVT/PAT/PACs though mild PAC burden noted. TTE wnl. BB Trial has been successful with continue.  No plans to up titrate given HRs int he 55-60 bpm range.  Patient interested in GLP-1 options.  Agree with trial for both weight mgmt and htn mgmt.  Orders:  •  POCT ECG

## 2025-06-25 NOTE — ASSESSMENT & PLAN NOTE
Last FLP on 01/23/25 with LDL 84 and TRI at 301. Lab work collected today is currently pending. On statin / omega 3 at this time. TRI with labile control. Will reassess with possible uptitration of statin vs fenofibrate addition pending her labs from today.

## 2025-06-25 NOTE — ASSESSMENT & PLAN NOTE
BP log today with SBP in the 110-145 mmHg.  Cont low sodium diet.   on diet/lifestyle modification. Goal weight loss is 5% of her current weight to 170 lb to start.  Referral for wt mgmt placed at this time.  Orders:  •  POCT ECG

## 2025-06-25 NOTE — PROGRESS NOTES
[facility]   [facility] [place] [id number] [facility] [address] [place] [address]  [place] [address] [place] [address]  p: [phone] p: [phone]  f: [phone] f: [phone]         Cardiology Consultation Visit    [address]  Patient Identification:  Estefani Olmstead  1965     [id number] Care Team:  Lea Reyes, MD (PCP)  No ref. provider found (Referring Provider)         ASSESSMENT & PLAN     Assessment & Plan  PSVT (paroxysmal supraventricular tachycardia) (HCC)  Several years of palpitations. Labs without clear aggravating factors. On buproprion since 2021. Holter with symptomatic PSVT/PAT/PACs though mild PAC burden noted. TTE wnl. BB Trial has been successful with continue.  No plans to up titrate given HRs int he 55-60 bpm range.  Patient interested in GLP-1 options.  Agree with trial for both weight mgmt and htn mgmt.  Orders:  •  POCT ECG    PAT (paroxysmal atrial tachycardia) (HCC)  Plan as above.        Palpitations  Plan as above.   Orders:  •  POCT ECG    Hypercholesterolemia  Last FLP on 01/23/25 with LDL 84 and TRI at 301. Lab work collected today is currently pending. On statin / omega 3 at this time. TRI with labile control. Will reassess with possible uptitration of statin vs fenofibrate addition pending her labs from today.       Prediabetes  A1c down to 6.2.  Patient to cont working with her PCP on diet/lifestyle modification.       Hypertension, unspecified type  BP log today with SBP in the 110-145 mmHg.  Cont low sodium diet.   on diet/lifestyle modification. Goal weight loss is 5% of her current weight to 170 lb to start.  Referral for wt mgmt placed at this time.  Orders:  •  POCT ECG    Obstructive sleep apnea  Sleep study ordered in 2021, not yet completed. Hold on restarting        Overweight (BMI 25.0-29.9)   on diet/lifestyle modification       Obesity (BMI  Patient Assistance    I called [phone] regarding the patients Tysabri issue. Per the notes in Epic the patients insurance denied the Tysabri as \"site of care\". Per Debra Mason at [facility]( [employer]) the patients insurance must approve the Tysabri or if denied an appeal process must be started. While this is going on the patient may be granted \"COMP\" doses, which is limited, not endless and the patient has already had 1 comp dose. I tried to call the patient to inform her of this and lm for her to call me. I also emailed pharmacy and auth dept to inform them of the situation. [phone])    Orders:  •  Ambulatory Referral to Weight Management; Future      Discussion & Summary: Mrs. Estefani Olmstead was seen and examined today for routine follow up of the conditions noted above. Our primary focus at this time is continued patient education, chronic condition management, and preventative care. Precautions and reasons to call our office or proceed to ER were discussed in detail. Patient expressed understanding and questions were answered.     Follow Up & Next Steps: Plan on follow up in-clinic in 6 months.      SUBJECTIVE   Subjective   Chief Complaint: Mrs. Olmstead is a 60 y.o. female and former smoker with a PMH significant for but not limited to PSVT/PAT, HLD, HTN, CARA and Overweight.  She presents to clinic for routine Follow Up.    HPI / Interval Hx: She was originally seen in clinic on 10/02/24 by Self-Referral to establish care. She'd been at her baseline state of health:  independent of adl's, working in Rehab with [facility], though not exercising regularly, when she decided to have her Palpitations evaluated. She stated that originally her symptoms began about 4 years ago and would come and go. She noted no prior history of overt anemia, fair control of her thyroid function, treatment with Wellbutrin since 2021. She denied any recent hospitalizations, prior cardiac history byond the above, family history of early cad, or family history of scd.  She noted HTN in her family, a brother who passed of an CVA/ICH in his late 70s. She also noted CVA in a second brother living in his late 40s.  Her father was in his late 60s with a CVA.  She also noted that her mother with a CVA in her 80s. For her mild PAT we started her on a low dose of metoprolol succinate.    Since her last visit, she's done well with the Toprol and notes improvement in her symptoms with only one or two fleeting breakthroughs.  She reports occasional SOB/LOONEY when she is getting ready for work or when she is rushing into  work.  She does more strenuous activities at work without symptoms.  She admits to being deconditioned and does not exercise regularly.  She'd like to hold on a stress test pending her planned 5% weight loss.    She currently denies any anginal cp, diaphoresis, n/v, sob at rest, near syncope, syncope, orthopnea, pnd, or ble edema. She notes fair control of her diet and exercise habits. She reports a diet that is somewhat balanced but has room for improvement and no dedicated exercise. She is otherwise compliant with medications but does not maintain a detailed BP log.  Of note, the patient's cardiac risk factor(s) include: advanced age and dyslipidemia.    Review of Systems: This was a comprehensive review of symptoms with problem focused details as note above.  Review of Systems   Constitutional: Negative for chills and fever.   HENT:  Negative for congestion and sore throat.    Eyes:  Negative for blurred vision and double vision.   Cardiovascular:  Positive for irregular heartbeat and palpitations. Negative for chest pain, claudication, dyspnea on exertion, leg swelling, near-syncope, orthopnea, paroxysmal nocturnal dyspnea and syncope.   Respiratory:  Positive for snoring (Mild obstructive sleep apnea, HECTOR = 8.6, wears mouth guard). Negative for cough, shortness of breath, sleep disturbances due to breathing and wheezing.    Hematologic/Lymphatic: Negative for bleeding problem. Does not bruise/bleed easily.   Skin:  Negative for itching and rash.   Musculoskeletal:  Positive for arthritis (right hand). Negative for joint pain and joint swelling.   Gastrointestinal:  Positive for abdominal pain (getting over gi bug) and diarrhea. Negative for constipation, nausea and vomiting.   Genitourinary:  Negative for dysuria and hematuria.   Neurological:  Negative for headaches and light-headedness.   Psychiatric/Behavioral:  Positive for depression (well controlled with wellbutrin). The patient is not nervous/anxious.          Past Hx: The following portions of the patient's history were reviewed and updated as appropriate: past medical history, past social history, past family history, past surgical history, current medications, allergies, past surgical history and problem list.  Social History     Socioeconomic History   • Marital status: /Civil Union     Spouse name: Not on file   • Number of children: Not on file   • Years of education: Not on file   • Highest education level: Not on file   Occupational History   • Not on file   Tobacco Use   • Smoking status: Former     Current packs/day: 0.00     Average packs/day: 0.2 packs/day for 15.0 years (3.0 ttl pk-yrs)     Types: Cigarettes     Start date:      Quit date:      Years since quittin.5   • Smokeless tobacco: Never   Vaping Use   • Vaping status: Never Used   Substance and Sexual Activity   • Alcohol use: Yes     Alcohol/week: 4.0 standard drinks of alcohol     Types: 4 Standard drinks or equivalent per week     Comment: social   • Drug use: No   • Sexual activity: Not Currently     Birth control/protection: Post-menopausal   Other Topics Concern   • Not on file   Social History Narrative   • Not on file     Social Drivers of Health     Financial Resource Strain: Not on file   Food Insecurity: Not on file   Transportation Needs: Not on file   Physical Activity: Not on file   Stress: Not on file   Social Connections: Not on file   Intimate Partner Violence: Not on file   Housing Stability: Not on file        Family History   Problem Relation Name Age of Onset   • Stroke Mother     • Stroke Father     • Dementia Sister     • Alzheimer's disease Sister     • Stroke Brother     • Other (stroke) Brother     • No Known Problems Maternal Aunt     • No Known Problems Maternal Aunt     • No Known Problems Maternal Aunt     • No Known Problems Maternal Aunt     • No Known Problems Paternal Aunt     • Breast cancer Cousin cherrie 50   • Uterine cancer Cousin  maternal         76   • Diabetes Family     • Hyperlipidemia Family     • Hypertension Family          Patient Active Problem List   Diagnosis   • Vaginal atrophy   • Hypercholesterolemia   • Hypothyroidism   • Moderate episode of recurrent major depressive disorder (HCC)   • Obstructive sleep apnea   • Tendonitis of both rotator cuffs   • Subacromial bursitis of both shoulders   • Sprain of medial collateral ligament of left knee, initial encounter   • Hypertension   • PSVT (paroxysmal supraventricular tachycardia) ([facility])   • Palpitations   • Overweight (BMI 25.0-29.9)        Past Surgical History:   Procedure Laterality Date   • COLONOSCOPY     • LAPAROSCOPIC CHOLECYSTECTOMY          Current Outpatient Medications   Medication Instructions   • ALPRAZolam (XANAX) 0.25 mg, Oral, Daily PRN, Do not take with alcohol. Do not drive after taking this.   • aspirin (ECOTRIN LOW STRENGTH) 81 mg, Oral, Daily   • b complex vitamins capsule 1 capsule, Daily   • buPROPion (WELLBUTRIN XL) 150 mg 24 hr tablet TAKE ONE TABLET BY MOUTH DAILY   • celecoxib (CELEBREX) 200 mg, Oral, 2 times daily, With food   • cetirizine (ZYRTEC) 10 mg, Daily   • cholecalciferol (VITAMIN D3) 400 Units, Daily   • Lactobacillus (PROBIOTIC ACIDOPHILUS PO) Take by mouth   • levothyroxine 75 mcg, Oral, Daily   • metoprolol succinate (TOPROL-XL) 25 mg, Oral, SL MYCHART Nightly   • Multiple Vitamin (MULTIVITAMINS PO) 1 tablet, Daily   • Omega-3 Fatty Acids (OMEGA-3 FISH OIL) 1200 MG CAPS Take by mouth   • ondansetron (ZOFRAN ODT) 4 mg, Oral, Every 6 hours PRN   • rosuvastatin (CRESTOR) 20 mg, Oral, Daily   • vitamin E 100 Units, Daily        Allergies   Allergen Reactions   • Osphena [Ospemifene] Hives   • Food    • Nuts - Food Allergy    • Pollen Extract    • Sesame Seed (Diagnostic) - Food Allergy    • Sulfamethoxazole-Trimethoprim Headache   • Wheat Bran - Food Allergy          OBJECTIVE   Objective     Physical Exam: Patient seen and examined with no  need for interpretive services.   VITALS   Vitals:    25 1301   BP: 118/70   Pulse: 65       BMI   Body mass index is 30.29 kg/m².  Physical Exam  Constitutional:       Interventions: She is not intubated.  HENT:      Head: Normocephalic and atraumatic.      Right Ear: External ear normal.      Left Ear: External ear normal.      Nose: Nose normal.     Eyes:      General: No scleral icterus.        Right eye: No discharge.         Left eye: No discharge.     Neck:      Vascular: No carotid bruit.     Cardiovascular:      Rate and Rhythm: Normal rate and regular rhythm.      Pulses: Normal pulses.      Heart sounds: Normal heart sounds.   Pulmonary:      Effort: No tachypnea, accessory muscle usage or respiratory distress. She is not intubated.      Breath sounds: Normal breath sounds.     Musculoskeletal:      Cervical back: Neck supple.      Right lower leg: No edema.      Left lower leg: No edema.     Skin:     General: Skin is warm and dry.      Capillary Refill: Capillary refill takes less than 2 seconds.     Neurological:      Mental Status: She is alert and oriented to person, place, and time. Mental status is at baseline.     Psychiatric:         Mood and Affect: Mood normal.         Behavior: Behavior normal.          Recent CV Testin25  EKG  NSR, NSST/TW Findings  24  TTE  LVEF 65%, G1DD, No RWMA, Mild LAE  24  30D  ZioPatch with PSVT/PAT, Symptomatic PACs  21  EKG  SBrady    For historical clinical findings, see the Supplemental Documentation section.     CLOSING   Administrative Statements     Coordination of Care: During our visit, I spent 20 minutes with the patient, and greater than 60% of this time was spent on counseling and coordination of care, including addressing diagnostic results, prognosis, risks and benefits of treatment options, instructions for management, patient/family education, importance of treatment compliance, along with risk factor  reductions. During today's visit there was no need for interpretive services.     Dictation Disclaimer: This note was completed in part utilizing direct voice recognition software. Grammatical errors, random word insertion, spelling mistakes, and incomplete sentences may be an occasional consequence of the system secondary to software limitations, ambient noise and hardware issues. At the time of dictation, efforts were made to edit, clarify and /or correct errors.  Please read the chart carefully and recognize, using context, where substitutions have occurred.  If you have any questions or concerns about the context, text or information contained within the body of this dictation, please contact me, the provider, for further clarification.     Lenora Bryant, DO 06/25/25                      SUPPLEMENTAL DOCUMENTATION     Thoracic History   ======================  PRIOR VISIT INTERVALS  ======================  Date: 06/25/25, Follow Up Visit  Date: 10/02/24, Initial Consultation      =====================  PRIOR DIAGNOSTIC TESTS  =====================  IMAGING   I have personally reviewed pertinent reports.  No results found.      EKG   Date: 06/25/25, normal sinus rhythm, nonspecific ST and T wave findings  Date: 02/21/21, sinus bradycardia  Date: 02/10/14, normal sinus rhythm  Date: 10/13/20, normal sinus rhythm      TELE   No results found for this or any recent visit.       HOLTER   Extended Holter Study Date: 08/25/24-09/06/24  IMPRESSION:  Normal device function capturing 11 days and 19  hours of analyzed data  Baseline rhythm was NSR w/ occasional SBRADY/STACH (min 42 bpm, avg 70 bpm, max 137 bpm)  Occasional isolated SVE noted of mild burden (PAC < 3.9%)  Rare isolated VE noted of insignificant burden (PVC < 1%)  23 short salvos of PSVT/PAT-likely were noted (fastest/longest: 19 sec at a max of 222 bpm and avg 154 bpm)  No prolonged PAF/Flutter or NSVT was identified  No significant pauses or advanced  degree heart block  Triggered events (2) corresponded with SVE: PSVT/PAT (1), NSR-SVE (1)    Extended Holter Study Date: 08/11/24-08/25/24  IMPRESSION:  Normal device function capturing 12 days and 12 hours of analyzed data  Baseline rhythm was NSR w/ occasional SBRADY/STACH (min 42 bpm, avg 70 bpm, max 127 bpm)  Occasional isolated SVE noted of mild burden (PAC < 1.9%)  Occasional isolated VE noted of insignificant burden (PVC < 1%)  Occasional short salvos of PSVT/PAT-likely were noted (fastest: 7 beats at 194 bpm, longest: 20 beats at 124 bpm)  No prolonged PAF/Flutter or NSVT was identified  No significant pauses or advanced degree heart block  Triggered events (2) corresponded with SVE: NSR-SVE (1), STACH-SVE (1)  Diary events (2) corresponded with SVE: NSR (1), NSR-SVE (1)  No triggered events corresponding with significant arrhythmia      DEVICE   No results found for this or any previous visit.      EP   No results found for this or any previous visit.      CT   No results found for this or any previous visit.      CATH   No results found for this or any previous visit.      STRESS   No results found for this or any previous visit.       TTE   Results for orders placed during the hospital encounter of 09/20/24  Echo complete w/ contrast if indicated  Interpretation Summary  •  Left Ventricle: Left ventricular cavity size is normal. Wall thickness is normal. The left ventricular ejection fraction is 65%. Systolic function is normal. Wall motion is normal. Diastolic function is mildly abnormal, consistent with grade I (abnormal) relaxation.  •  Right Ventricle: Right ventricular cavity size is normal. Systolic function is normal.  •  Left Atrium: The atrium is mildly dilated.      CHEYANNE   No results found for this or any previous visit.      CMR   No results found for this or any previous visit.      LABS     Lab Results   Component Value Date     02/10/2014    K 4.0 06/25/2025    K 4.4 01/23/2025    K 4.0  07/05/2024    K 3.7 02/10/2014     06/25/2025     01/23/2025     07/05/2024     02/10/2014    CO2 25 06/25/2025    CO2 26 01/23/2025    CO2 26 07/05/2024    CO2 26 02/10/2014    BUN 17 06/25/2025    BUN 15 01/23/2025    BUN 19 07/05/2024    BUN 12 02/10/2014    CREATININE 0.67 06/25/2025    CREATININE 0.79 01/23/2025    CREATININE 0.80 07/05/2024    CREATININE 0.78 02/10/2014    EGFR 94 06/25/2025    EGFR 81 01/23/2025    EGFR 79 07/05/2024    GLUC 93 07/05/2024    GLUC 92 06/10/2021    GLUC 91 02/21/2021    AST 21 06/25/2025    AST 21 01/23/2025    AST 18 05/19/2023    AST 25 07/23/2015    AST 21 03/18/2015    AST 16 02/10/2014    ALT 21 06/25/2025    ALT 25 01/23/2025    ALT 32 05/19/2023    ALT 47 07/23/2015    ALT 29 03/18/2015    ALT 31 02/10/2014    TBILI 0.53 06/25/2025    TBILI 0.47 01/23/2025    TBILI 0.60 05/19/2023    ALB 4.5 06/25/2025    ALB 4.5 01/23/2025    ALB 4.3 05/19/2023    ALB 4.4 07/23/2015    ALB 4.2 03/18/2015    ALB 4.7 02/10/2014    CALCIUM 9.3 06/25/2025    CALCIUM 9.5 01/23/2025    CALCIUM 9.8 07/05/2024    CALCIUM 9.3 02/10/2014      Lab Results   Component Value Date    WBC 5.42 06/25/2025    WBC 5.44 01/23/2025    WBC 7.77 07/05/2024    WBC 6.30 02/10/2014    HGB 12.1 06/25/2025    HGB 12.1 01/23/2025    HGB 12.5 07/05/2024    HGB 12.6 02/10/2014    HCT 36.5 06/25/2025    HCT 39.1 01/23/2025    HCT 38.1 07/05/2024    HCT 38.0 02/10/2014     06/25/2025     01/23/2025     07/05/2024     02/10/2014    INR 0.93 02/21/2021      Lab Results   Component Value Date    CHOLESTEROL 191 06/25/2025    CHOLESTEROL 195 01/23/2025    CHOLESTEROL 202 (H) 07/05/2024    TRIG 232 (H) 06/25/2025    TRIG 310 (H) 01/23/2025    TRIG 347 (H) 07/05/2024    TRIG 223 07/23/2015    TRIG 268 07/10/2014    HDL 47 (L) 06/25/2025    HDL 49 (L) 01/23/2025    HDL 49 (L) 07/05/2024    HDL 59 07/23/2015    HDL 54 07/10/2014    LDLCALC 98 06/25/2025    LDLCALC 84  "01/23/2025    LDLCALC 84 07/05/2024    LDLCALC 137 (H) 07/23/2015    LDLCALC 214 (H) 07/10/2014     Lab Results   Component Value Date    HGBA1C 6.2 (H) 06/25/2025    HGBA1C 6.4 (H) 01/23/2025    HGBA1C 6.0 (H) 07/05/2024    HGBA1C 6.0 (H) 07/23/2015    GLUF 105 (H) 06/25/2025    GLUF 93 01/23/2025    GLUF 113 (H) 05/19/2023    GLUF 99 07/10/2014     Lab Results   Component Value Date    NRF4EQVUMMLJ 2.072 06/25/2025    SFB8YZULJSGC 3.860 01/23/2025    HHV0GHLCAACW 0.587 07/05/2024    HII7RRNYQAET 2.360 07/23/2015     No results found for: \"HSTNI0\", \"HSTNI2\", \"HSTNI4\", \"TROPONINI\"  No results found for: \"BNP\", \"NTBNP\"       "

## 2025-07-07 DIAGNOSIS — F33.1 MODERATE EPISODE OF RECURRENT MAJOR DEPRESSIVE DISORDER (HCC): ICD-10-CM

## 2025-07-08 RX ORDER — BUPROPION HYDROCHLORIDE 150 MG/1
150 TABLET ORAL DAILY
Qty: 90 TABLET | Refills: 1 | Status: SHIPPED | OUTPATIENT
Start: 2025-07-08

## 2025-07-22 LAB
ATRIAL RATE: 65 BPM
P AXIS: 58 DEGREES
PR INTERVAL: 132 MS
QRS AXIS: 53 DEGREES
QRSD INTERVAL: 82 MS
QT INTERVAL: 404 MS
QTC INTERVAL: 420 MS
T WAVE AXIS: 27 DEGREES
VENTRICULAR RATE: 65 BPM

## 2025-08-12 ENCOUNTER — OFFICE VISIT (OUTPATIENT)
Dept: INTERNAL MEDICINE CLINIC | Facility: CLINIC | Age: 60
End: 2025-08-12
Payer: COMMERCIAL

## 2025-08-22 ENCOUNTER — TELEPHONE (OUTPATIENT)
Age: 60
End: 2025-08-22